# Patient Record
Sex: FEMALE | Race: WHITE | NOT HISPANIC OR LATINO | Employment: OTHER | URBAN - METROPOLITAN AREA
[De-identification: names, ages, dates, MRNs, and addresses within clinical notes are randomized per-mention and may not be internally consistent; named-entity substitution may affect disease eponyms.]

---

## 2017-01-23 ENCOUNTER — PATIENT MESSAGE (OUTPATIENT)
Dept: FAMILY MEDICINE | Facility: CLINIC | Age: 67
End: 2017-01-23

## 2017-01-23 DIAGNOSIS — R12 HEARTBURN: ICD-10-CM

## 2017-01-24 ENCOUNTER — DOCUMENTATION ONLY (OUTPATIENT)
Dept: FAMILY MEDICINE | Facility: CLINIC | Age: 67
End: 2017-01-24

## 2017-01-24 RX ORDER — ESOMEPRAZOLE MAGNESIUM 40 MG/1
CAPSULE, DELAYED RELEASE ORAL
Qty: 90 CAPSULE | Refills: 0 | Status: SHIPPED | OUTPATIENT
Start: 2017-01-24 | End: 2017-05-03 | Stop reason: SDUPTHER

## 2017-01-24 NOTE — PROGRESS NOTES
Pre-Visit Chart Review  For Appointment Scheduled on 01/26/2017    Health Maintenance Due   Topic Date Due    Zoster Vaccine  01/29/2008    Pneumococcal (65+) (1 of 2 - PCV13) 01/29/2013    Influenza Vaccine  08/01/2016

## 2017-01-26 ENCOUNTER — LAB VISIT (OUTPATIENT)
Dept: LAB | Facility: HOSPITAL | Age: 67
End: 2017-01-26
Attending: FAMILY MEDICINE
Payer: MEDICARE

## 2017-01-26 ENCOUNTER — OFFICE VISIT (OUTPATIENT)
Dept: FAMILY MEDICINE | Facility: CLINIC | Age: 67
End: 2017-01-26
Payer: MEDICARE

## 2017-01-26 VITALS
HEIGHT: 64 IN | HEART RATE: 99 BPM | SYSTOLIC BLOOD PRESSURE: 107 MMHG | TEMPERATURE: 99 F | DIASTOLIC BLOOD PRESSURE: 69 MMHG | BODY MASS INDEX: 31.27 KG/M2 | OXYGEN SATURATION: 95 % | WEIGHT: 183.19 LBS

## 2017-01-26 DIAGNOSIS — E55.9 VITAMIN D DEFICIENCY: ICD-10-CM

## 2017-01-26 DIAGNOSIS — I95.9 HYPOTENSION, UNSPECIFIED HYPOTENSION TYPE: Primary | ICD-10-CM

## 2017-01-26 DIAGNOSIS — Z23 NEED FOR VACCINATION FOR STREP PNEUMONIAE: ICD-10-CM

## 2017-01-26 DIAGNOSIS — R53.83 FATIGUE, UNSPECIFIED TYPE: ICD-10-CM

## 2017-01-26 DIAGNOSIS — Z23 NEEDS FLU SHOT: ICD-10-CM

## 2017-01-26 DIAGNOSIS — R06.09 DOE (DYSPNEA ON EXERTION): ICD-10-CM

## 2017-01-26 LAB
25(OH)D3+25(OH)D2 SERPL-MCNC: 45 NG/ML
ALBUMIN SERPL BCP-MCNC: 4 G/DL
ALP SERPL-CCNC: 59 U/L
ALT SERPL W/O P-5'-P-CCNC: 16 U/L
ANION GAP SERPL CALC-SCNC: 10 MMOL/L
AST SERPL-CCNC: 16 U/L
BASOPHILS # BLD AUTO: 0.03 K/UL
BASOPHILS NFR BLD: 0.5 %
BILIRUB SERPL-MCNC: 0.3 MG/DL
BUN SERPL-MCNC: 25 MG/DL
CALCIUM SERPL-MCNC: 9.4 MG/DL
CHLORIDE SERPL-SCNC: 104 MMOL/L
CO2 SERPL-SCNC: 28 MMOL/L
CREAT SERPL-MCNC: 0.9 MG/DL
DIFFERENTIAL METHOD: NORMAL
EOSINOPHIL # BLD AUTO: 0.1 K/UL
EOSINOPHIL NFR BLD: 2.3 %
ERYTHROCYTE [DISTWIDTH] IN BLOOD BY AUTOMATED COUNT: 14.1 %
EST. GFR  (AFRICAN AMERICAN): >60 ML/MIN/1.73 M^2
EST. GFR  (NON AFRICAN AMERICAN): >60 ML/MIN/1.73 M^2
GLUCOSE SERPL-MCNC: 92 MG/DL
HCT VFR BLD AUTO: 37.9 %
HGB BLD-MCNC: 12.5 G/DL
LYMPHOCYTES # BLD AUTO: 2.1 K/UL
LYMPHOCYTES NFR BLD: 33.8 %
MCH RBC QN AUTO: 30.1 PG
MCHC RBC AUTO-ENTMCNC: 33 %
MCV RBC AUTO: 91 FL
MONOCYTES # BLD AUTO: 0.4 K/UL
MONOCYTES NFR BLD: 7.1 %
NEUTROPHILS # BLD AUTO: 3.5 K/UL
NEUTROPHILS NFR BLD: 56.1 %
PLATELET # BLD AUTO: 215 K/UL
PMV BLD AUTO: 9.9 FL
POTASSIUM SERPL-SCNC: 3.7 MMOL/L
PROT SERPL-MCNC: 7.2 G/DL
RBC # BLD AUTO: 4.15 M/UL
SODIUM SERPL-SCNC: 142 MMOL/L
TSH SERPL DL<=0.005 MIU/L-ACNC: 0.45 UIU/ML
WBC # BLD AUTO: 6.18 K/UL

## 2017-01-26 PROCEDURE — 84443 ASSAY THYROID STIM HORMONE: CPT

## 2017-01-26 PROCEDURE — 1157F ADVNC CARE PLAN IN RCRD: CPT | Mod: S$GLB,,, | Performed by: PHYSICIAN ASSISTANT

## 2017-01-26 PROCEDURE — 3078F DIAST BP <80 MM HG: CPT | Mod: S$GLB,,, | Performed by: PHYSICIAN ASSISTANT

## 2017-01-26 PROCEDURE — 85025 COMPLETE CBC W/AUTO DIFF WBC: CPT

## 2017-01-26 PROCEDURE — 90662 IIV NO PRSV INCREASED AG IM: CPT | Mod: S$GLB,,, | Performed by: FAMILY MEDICINE

## 2017-01-26 PROCEDURE — 3074F SYST BP LT 130 MM HG: CPT | Mod: S$GLB,,, | Performed by: PHYSICIAN ASSISTANT

## 2017-01-26 PROCEDURE — 1159F MED LIST DOCD IN RCRD: CPT | Mod: S$GLB,,, | Performed by: PHYSICIAN ASSISTANT

## 2017-01-26 PROCEDURE — 99214 OFFICE O/P EST MOD 30 MIN: CPT | Mod: 25,S$GLB,, | Performed by: PHYSICIAN ASSISTANT

## 2017-01-26 PROCEDURE — 80053 COMPREHEN METABOLIC PANEL: CPT

## 2017-01-26 PROCEDURE — 90670 PCV13 VACCINE IM: CPT | Mod: S$GLB,,, | Performed by: PHYSICIAN ASSISTANT

## 2017-01-26 PROCEDURE — G0008 ADMIN INFLUENZA VIRUS VAC: HCPCS | Mod: S$GLB,,, | Performed by: FAMILY MEDICINE

## 2017-01-26 PROCEDURE — 36415 COLL VENOUS BLD VENIPUNCTURE: CPT | Mod: PO

## 2017-01-26 PROCEDURE — G0009 ADMIN PNEUMOCOCCAL VACCINE: HCPCS | Mod: S$GLB,,, | Performed by: PHYSICIAN ASSISTANT

## 2017-01-26 PROCEDURE — 99999 PR PBB SHADOW E&M-EST. PATIENT-LVL IV: CPT | Mod: PBBFAC,,, | Performed by: PHYSICIAN ASSISTANT

## 2017-01-26 PROCEDURE — 1160F RVW MEDS BY RX/DR IN RCRD: CPT | Mod: S$GLB,,, | Performed by: PHYSICIAN ASSISTANT

## 2017-01-26 PROCEDURE — 82306 VITAMIN D 25 HYDROXY: CPT

## 2017-01-26 PROCEDURE — 1125F AMNT PAIN NOTED PAIN PRSNT: CPT | Mod: S$GLB,,, | Performed by: PHYSICIAN ASSISTANT

## 2017-01-26 RX ORDER — GLUCOSAM/CHON-MSM1/C/MANG/BOSW 500-416.6
TABLET ORAL
COMMUNITY
Start: 2017-01-21 | End: 2019-02-26 | Stop reason: SDUPTHER

## 2017-01-26 RX ORDER — BLOOD-GLUCOSE METER
EACH MISCELLANEOUS
COMMUNITY
Start: 2017-01-21

## 2017-01-26 RX ORDER — CHLORDIAZEPOXIDE HYDROCHLORIDE AND CLIDINIUM BROMIDE 5; 2.5 MG/1; MG/1
CAPSULE ORAL
Refills: 0 | COMMUNITY
Start: 2017-01-09

## 2017-01-26 RX ORDER — BLOOD-GLUCOSE METER
EACH MISCELLANEOUS
COMMUNITY
Start: 2016-11-01

## 2017-01-26 RX ORDER — FUROSEMIDE 20 MG/1
20 TABLET ORAL 2 TIMES DAILY
Qty: 60 TABLET | Refills: 11 | Status: CANCELLED | OUTPATIENT
Start: 2017-01-26 | End: 2018-01-26

## 2017-01-26 RX ORDER — ISOPROPYL ALCOHOL 0.75 G/1
SWAB TOPICAL
COMMUNITY
Start: 2017-01-21 | End: 2017-12-02 | Stop reason: SDUPTHER

## 2017-01-26 RX ORDER — CALCIUM CITRATE/VITAMIN D3 200MG-6.25
TABLET ORAL
COMMUNITY
Start: 2017-01-21 | End: 2019-02-26 | Stop reason: SDUPTHER

## 2017-01-26 NOTE — PROGRESS NOTES
Subjective:       Patient ID: Franca Coffey is a 66 y.o. female.    Chief Complaint: Hypotension    HPI Comments: Mrs. Coffey is a 66 year old female who presents to clinic for evaluation of hypotension. She states her took her BP at the pharmacy when she was feeling fatigued/dizzy and she states her systolic was in the 70s. She decided to stop her avapro and toprol XL at that time. She states her BP has been running 100/68s-120s/60s. She has continued lasix 40 mg daily. She has no hx of CAD or CHF. She reports occasional lower extremity swelling. She continues to complain of generalized fatigue, occasional dizziness (which she states is chronic issue from inner ear problem), and shortness of breath with exertion for the last 3 weeks. She states shortness of breath occurs intermittently. She admits to rare dry cough, but no fever/chills. She complains of chest pain when bending over to pick something from the ground, but denies chest pain with exertion. She complains of GERD. Of note, she has last 20 lbs since August of last year, which is intentional with dietary changes. EKG reviewed and unchanged from prior.     Review of Systems   Constitutional: Positive for fatigue. Negative for activity change, appetite change and fever.   HENT: Negative for congestion, ear pain, hearing loss, sinus pressure and sore throat.    Eyes: Negative for visual disturbance.   Respiratory: Positive for shortness of breath (on exertion). Negative for cough, chest tightness and wheezing.    Cardiovascular: Positive for chest pain (atypical ). Negative for palpitations.   Gastrointestinal: Negative for abdominal pain, constipation, diarrhea, nausea and vomiting.   Musculoskeletal: Positive for myalgias. Negative for arthralgias.   Neurological: Positive for dizziness. Negative for syncope, weakness, light-headedness and headaches.   Hematological: Negative for adenopathy.       Objective:      Vitals:    01/26/17 0825    BP: 107/69   Pulse: 99   Temp: 98.9 °F (37.2 °C)     Physical Exam   Constitutional: She is oriented to person, place, and time. She appears well-developed.   Obese body habitus.   HENT:   Head: Normocephalic and atraumatic.   Eyes: Conjunctivae, EOM and lids are normal. Pupils are equal, round, and reactive to light.   Cardiovascular: Regular rhythm and normal heart sounds.    Pulmonary/Chest: Effort normal and breath sounds normal.   Musculoskeletal:   TTP of bilateral lower legs. No swelling, erythema, or warmth of the calf muscles.    Neurological: She is alert and oriented to person, place, and time.   + symptoms with valeria-hallpike.    Skin: Skin is warm and dry.   Psychiatric: She has a normal mood and affect. Her speech is normal.   Vitals reviewed.      Assessment:       1. Hypotension, unspecified hypotension type    2. CORONADO (dyspnea on exertion)    3. Fatigue, unspecified type    4. Vitamin D deficiency    5. Needs flu shot    6. Need for vaccination for Strep pneumoniae        Plan:       Hypotension, unspecified hypotension type        - Likely due to overmedication with recent weight loss. Continue to hold avapro and toprol XL. Decrease lasix 20 mg daily PRN for lower ext swelling.         - Continue to monitor BP at home with goal >100/60 and <140/90.        - Dizziness appears to be chronic, but may also be secondary to recent hypotension. Continue to monitor.     CORONADO (dyspnea on exertion)  -     IN OFFICE EKG 12-LEAD (to Muse). EKG reviewed with MD and unchanged from prior.     Fatigue, unspecified type  -     CBC auto differential; Future; Expected date: 1/26/17  -     Comprehensive metabolic panel; Future; Expected date: 1/26/17  -     TSH; Future; Expected date: 1/26/17  -     Vitamin D; Future; Expected date: 1/26/17    Vitamin D deficiency  -     Vitamin D; Future; Expected date: 1/26/17    Needs flu shot  -     Influenza - High Dose (65+) (PF) (IM)    Need for vaccination for Strep  pneumoniae  -     Pneumococcal Conjugate Vaccine (13 Valent) (IM)        Follow up in 1 month

## 2017-01-26 NOTE — MR AVS SNAPSHOT
[facility]  [address]  [place] [address]  Phone: [phone]  Fax: [phone]                  Franca Coffey   2017 8:00 AM   Office Visit    Description:  Female : 1950   Provider:  Elissa Osman PA-C   Department:  [facility]           Reason for Visit     Hypotension           Diagnoses this Visit        Comments    Hypotension, unspecified hypotension type    -  Primary     CORONADO (dyspnea on exertion)         Fatigue, unspecified type         Vitamin D deficiency         Needs flu shot         Need for vaccination for Strep pneumoniae                To Do List           Future Appointments        Provider Department Dept Phone    2017 11:10 AM LAB, [facility] - Lab [phone]    2017 11:00 AM Elissa Osman PA-C [facility] [phone]    3/7/2017 11:00 AM MD [facility] - Endo/Diabetes [phone]    3/14/2017 2:00 PM MD [facility] - Cardiology [phone]      Goals (5 Years of Data)     None      Follow-Up and Disposition     Return in about 4 weeks (around 2017).      [facility]     [facility] On Call Nurse Care Line - 24/7 Assistance  Registered nurses in the [facility] On Call Center provide clinical advisement, health education, appointment booking, and other advisory services.  Call for this free service at [phone].             Medications           Message regarding Medications     Verify the changes and/or additions to your medication regime listed below are the same as discussed with your clinician today.  If any of these changes or additions are incorrect, please notify your healthcare provider.        STOP taking these medications     GUAIFENESIN/PHENYLEPHRINE HCL (MUCINEX COLD ORAL) Take 1 tablet by mouth daily as needed.    dulaglutide (TRULICITY) 0.75 mg/0.5 mL PnIj Inject 0.75 mg into the skin once a week.    GLUCOPHAGE 500 mg tablet take 1  "tablet by mouth twice a day with food    furosemide (LASIX) 40 MG tablet TAKE 1 TABLET ONE TIME DAILY           Verify that the below list of medications is an accurate representation of the medications you are currently taking.  If none reported, the list may be blank. If incorrect, please contact your healthcare provider. Carry this list with you in case of emergency.           Current Medications     BD ALCOHOL SWABS PadM     chlordiazepoxide-clidinium 5-2.5 mg (LIBRAX) 5-2.5 mg Cap     duloxetine (CYMBALTA) 60 MG capsule Take 1 capsule (60 mg total) by mouth 2 (two) times daily.    esomeprazole (NEXIUM) 40 MG capsule TAKE 1 CAPSULE ONE TIME DAILY    gabapentin (NEURONTIN) 300 MG capsule Take 1 capsule (300 mg total) by mouth 2 (two) times daily.    levothyroxine (SYNTHROID) 75 MCG tablet take 1 tablet by mouth BEFORE BREAKFAST    liraglutide 0.6 mg/0.1 mL, 18 mg/3 mL, subq PNIJ (VICTOZA 2-KENJI) 0.6 mg/0.1 mL (18 mg/3 mL) PnIj Inject 1.2 mg into the skin once daily. 0.6 mg after 2 weeks advance to 1.2 mg    metoprolol succinate (TOPROL-XL) 50 MG 24 hr tablet TAKE 1 TABLET EVERY DAY    pravastatin (PRAVACHOL) 20 MG tablet Take 1 tablet (20 mg total) by mouth once daily.    topiramate (TOPAMAX) 50 MG tablet Take 1 tablet (50 mg total) by mouth once daily.    TRUE METRIX AIR GLUCOSE METER kit     TRUE METRIX GLUCOSE TEST STRIP Strp     TRUE METRIX LEVEL 1 Soln     TRUEPLUS LANCETS 28 gauge Misc     irbesartan (AVAPRO) 150 MG tablet Take 1 tablet (150 mg total) by mouth once daily.           Clinical Reference Information           Vital Signs - Last Recorded  Most recent update: 1/26/2017  8:29 AM by Lizzy Jordan MA    BP Pulse Temp Ht Wt SpO2    107/69 (BP Location: Right arm, Patient Position: Sitting, BP Method: Automatic) 99 98.9 °F (37.2 °C) (Oral) 5' 4" (1.626 m) 83.1 kg (183 lb 3.2 oz) 95%    BMI                31.45 kg/m2          Blood Pressure          Most Recent Value    BP  107/69      Allergies as " of 1/26/2017     Sulfa (Sulfonamide Antibiotics)    Penicillins    Adhesive    Garlic    Morphine      Immunizations Administered on Date of Encounter - 1/26/2017     Name Date Dose VIS Date Route    Influenza - High Dose 1/26/2017 0.5 mL 8/7/2015 Intramuscular    Pneumococcal Conjugate - 13 Valent 1/26/2017 0.5 mL 11/5/2015 Intramuscular      Orders Placed During Today's Visit      Normal Orders This Visit    IN OFFICE EKG 12-LEAD (to Muse)     Influenza - High Dose (65+) (PF) (IM)     Pneumococcal Conjugate Vaccine (13 Valent) (IM)     Future Labs/Procedures Expected by Expires    CBC auto differential  1/26/2017 3/27/2018    Comprehensive metabolic panel  1/26/2017 3/27/2018    TSH  1/26/2017 3/27/2018    Vitamin D  1/26/2017 3/27/2018      Instructions      Diabetes (General Information)  Diabetes is a long-term health problem. It means your body does not make enough insulin. Or it may mean that your body cannot use the insulin it makes. Insulin is a hormone in your body. It lets blood sugar (glucose) reach the cells in your body. All of your cells need glucose for fuel.  When you have diabetes, the glucose in your blood builds up because it cannot get into the cells. This buildup is called high blood sugar (hyperglycemia).  Your blood sugar level depends on several things. It depends on what kind of food you eat and how much of it you eat. It also depends on how much exercise you get, and how much insulin you have in your body. Eating too much of the wrong kinds of food or not taking diabetes medicine on time can cause high blood sugar. Infections can cause high blood sugar even if you are taking medicines correctly.  These things can also cause low blood sugar:  · Missing meals  · Not eating enough food  · Taking too much diabetes medicine  Diabetes can cause serious problems over time if you do not get treated. These problems include heart disease, stroke, kidney failure, and blindness. They also include  nerve pain or loss of feeling in your legs and feet, and gangrene of the feet. By keeping your blood sugar under control you can prevent or delay these problems.  Normal blood sugar levels are 80 to 100 before a meal and less than 180 in the 1 to 2 hours after a meal.  Home care  Follow these guidelines when caring for yourself at home:  · Follow the diet your healthcare provider gives you. Take insulin or other diabetes medicine exactly as told to.  · Watch your blood sugar as you are told to. Keep a log of your results. This will help your provider change your medicines to keep your blood sugar under control.  · Try to reach your ideal weight. You may be able to cut back on or not have to take diabetes medicine if you eat the right foods and get exercise.  · Do not smoke. Smoking worsens the effects of diabetes on your circulation. You are much more likely to have a heart attack if you have diabetes and you smoke.  · Take good care of your feet. If you have lost feeling in your feet, you may not see an injury or infection. Check your feet and between your toes at least once a week.  · Wear a medical alert bracelet or necklace, or carry a card in your wallet that says you have diabetes. This will help healthcare providers give you the right care if you get very ill and cannot tell them that you have diabetes.  Sick day plan  If you get a cold, the flu, or a bacterial or viral infection, take these steps:  · Look at your diabetes sick plan and call your healthcare provider as you were told to. You may need to call your provider right away if:  ¨ Your blood sugar is above 240 while taking your diabetes medicine  ¨ Your urine ketone levels are above normal or high  ¨ You have been vomiting more than 6 hours  ¨ You have trouble breathing or your breath ha s a fruity smell  ¨ You have a high fever  ¨ You have a fever for several days and you are not getting better  ¨ You get light-headed and are sleepier than  usual  · Keep taking your diabetes pills (oral medicine) even if you have been vomiting and are feeling sick. Call your provider right away because you may need insulin to lower your blood sugar until you recover from your illness.  · Keep taking your insulin even if you have been vomiting and are feeling sick. Call your provider right away to ask if you need to change your insulin dose. This will depend on your blood sugar results.  · Check your blood sugar every 2 to 4 hours, or at least 4 times a day.  · Check your ketones often. If you are vomiting and having diarrhea, watch them more often.  · Do not skip meals. Try to eat small meals on a regular schedule. Do this even if you do not feel like eating.  · Drink water or other liquids that do not have caffeine or calories. This will keep you from getting dehydrated. If you are nauseated or vomiting, takes small sips every 5 minutes. To prevent dehydration try to drink a cup (8 ounces) of fluids every hour while you are awake.  General care  Always bring a source of fast-acting sugar with you in case you have symptoms of low blood sugar (below 70). At the first sign of low blood sugar, eat or drink 15 to 20 grams of fast-acting sugar to raise your blood sugar. Examples are:  · 3 to 4 glucose tablets. You can buy these at most drugstores.  · 4 ounces (1/2 cup) of regular (not diet) soft drinks  · 4 ounces (1/2 cup) of any fruit juice  · 8 ounces (1 cup) of milk  · 5 to 6 pieces of hard candy  · 1 tablespoon of honey  Check your blood sugar 15 minutes after treating yourself. If it is still below 70, take 15 to 20 more grams of fast-acting sugar. Test again in 15 minutes. If it returns to normal (70 or above), eat a snack or meal to keep your blood sugar in a safe range. If it stays low, call your doctor or go to an emergency room.  Follow-up care  Follow-up with your healthcare provider, or as advised. For more information about diabetes, visit the American  Diabetes Association website at www.diabetes.org or call 823-196-3773.  When to seek medical advice  Call your healthcare provider right away if you have any of these symptoms of high blood sugar:  · Frequent urination  · Dizziness  · Drowsiness  · Thirst  · Headache  · Nausea or vomiting  · Abdominal pain  · Eyesight changes  · Fast breathing  · Confusion or loss of consciousness  Also call your provider right away if you have any of these signs of low blood sugar:  · Fatigue  · Headache  · Shakes  · Excess sweating  · Hunger  · Feeling anxious or restless  · Eyesight changes  · Drowsiness  · Weakness  · Confusion or loss of consciousness  Call 911  Call for emergency help right away if any of these occur:  · Chest pain or shortness of breath  · Dizziness or fainting  · Weakness of an arm or leg or one side of the face  · Trouble speaking or seeing   © 0583-5092 [employer]. [address] [place] [address]. All rights reserved. This information is not intended as a substitute for professional medical care. Always follow your healthcare professional's instructions.        Walking for Fitness  Fitness walking has something for everyone, even people who are already fit. Walking is one of the safest ways to condition your body aerobically. It can boost energy, help you lose weight, and reduce stress.    Physical benefits  · Walking strengthens your heart and lungs, and tones your muscles.  · When walking, your feet land with less impact than in other sports. This reduces chances of muscle, bone, and joint injury.  · Regular walking improves your cholesterol levels and lowers your risk of heart disease. And it helps you control your blood sugar if you have diabetes.  · Walking is a weight-bearing activity, which helps maintain bone density. This can help prevent osteoporosis.  Personal rewards  · Taking walks can help you relax and manage stress. And fitness walking may make you feel better about  yourself.  · Walking can help you sleep better at night and make you less likely to be depressed.  · Regular walking may help maintain your memory as you get older.  · Walking is a great way to spend extra time with friends and family members. Be sure to invite your dog along!  Q&A about fitness walking  Q: Will walking keep me fit?  A: Yes. Regular walking at the right pace gives you all the benefits of other aerobic activities, such as jogging and swimming.  Q: Will walking help me lose weight and keep it off?  A: Yes. Per mile, walking can burn as many calories as jogging. Your health care provider can help work walking into your weight-loss plan.  Q: Is walking safe for my health?  A: Yes. Walking is safe if you have high blood pressure, diabetes, heart disease, or other conditions. Talk to your health care provider before you start.  © 5283-7308 [employer]. [address] [place] [address]. All rights reserved. This information is not intended as a substitute for professional medical care. Always follow your healthcare professional's instructions.        Weight Management: Getting Started  Healthy bodies come in all shapes and sizes. Not all bodies are made to be thin. For some people, a healthy weight is higher than the average weight listed on weight charts. Your healthcare provider can help you decide on a healthy weight for you.    Reasons to lose weight  Losing weight can help with some health problems, such as high blood pressure, heart disease, diabetes, sleep apnea, and arthritis. You may also feel more energy.  Set your long-term goal  Your goal doesn't even have to be a specific weight. You may decide on a fitness goal (such as being able to walk 10 miles a week), or a health goal (such as lowering your blood pressure). Choose a goal that is measurable and reasonable, so you know when you've reached it. A goal of reaching a BMI of less than 25 is not always reasonable (or  possible).   Make an action plan  Habits dont change overnight. Setting your goals too high can leave you feeling discouraged if you cant reach them. Be realistic. Choose one or two small changes you can make now. Set an action plan for how you are going to make these changes. When you can stick to this plan, keep making a few more small changes. Taking small steps will help you stay on the path to success.  Track your progress  Write down your goals. Then, keep a daily record of your progress. Write down what you eat and how active you are. This record lets you look back on how much youve done. It may also help when youre feeling frustrated. Reward yourself for success. Even if you dont reach every goal, give yourself credit for what you do get done.  Get support  Encouragement from others can help make losing weight easier. Ask your family members and friends for support. They may even want to join you. Also look to your healthcare provider, registered dietitian, and  for help. Your local hospital can give you more information about nutrition, exercise, and weight loss.  © 2812-6303 [employer]. [address] [place] [address], [place] [address]. All rights reserved. This information is not intended as a substitute for professional medical care. Always follow your healthcare professional's instructions.

## 2017-01-26 NOTE — PATIENT INSTRUCTIONS
Diabetes (General Information)  Diabetes is a long-term health problem. It means your body does not make enough insulin. Or it may mean that your body cannot use the insulin it makes. Insulin is a hormone in your body. It lets blood sugar (glucose) reach the cells in your body. All of your cells need glucose for fuel.  When you have diabetes, the glucose in your blood builds up because it cannot get into the cells. This buildup is called high blood sugar (hyperglycemia).  Your blood sugar level depends on several things. It depends on what kind of food you eat and how much of it you eat. It also depends on how much exercise you get, and how much insulin you have in your body. Eating too much of the wrong kinds of food or not taking diabetes medicine on time can cause high blood sugar. Infections can cause high blood sugar even if you are taking medicines correctly.  These things can also cause low blood sugar:  · Missing meals  · Not eating enough food  · Taking too much diabetes medicine  Diabetes can cause serious problems over time if you do not get treated. These problems include heart disease, stroke, kidney failure, and blindness. They also include nerve pain or loss of feeling in your legs and feet, and gangrene of the feet. By keeping your blood sugar under control you can prevent or delay these problems.  Normal blood sugar levels are 80 to 100 before a meal and less than 180 in the 1 to 2 hours after a meal.  Home care  Follow these guidelines when caring for yourself at home:  · Follow the diet your healthcare provider gives you. Take insulin or other diabetes medicine exactly as told to.  · Watch your blood sugar as you are told to. Keep a log of your results. This will help your provider change your medicines to keep your blood sugar under control.  · Try to reach your ideal weight. You may be able to cut back on or not have to take diabetes medicine if you eat the right foods and get exercise.  · Do  not smoke. Smoking worsens the effects of diabetes on your circulation. You are much more likely to have a heart attack if you have diabetes and you smoke.  · Take good care of your feet. If you have lost feeling in your feet, you may not see an injury or infection. Check your feet and between your toes at least once a week.  · Wear a medical alert bracelet or necklace, or carry a card in your wallet that says you have diabetes. This will help healthcare providers give you the right care if you get very ill and cannot tell them that you have diabetes.  Sick day plan  If you get a cold, the flu, or a bacterial or viral infection, take these steps:  · Look at your diabetes sick plan and call your healthcare provider as you were told to. You may need to call your provider right away if:  ¨ Your blood sugar is above 240 while taking your diabetes medicine  ¨ Your urine ketone levels are above normal or high  ¨ You have been vomiting more than 6 hours  ¨ You have trouble breathing or your breath ha s a fruity smell  ¨ You have a high fever  ¨ You have a fever for several days and you are not getting better  ¨ You get light-headed and are sleepier than usual  · Keep taking your diabetes pills (oral medicine) even if you have been vomiting and are feeling sick. Call your provider right away because you may need insulin to lower your blood sugar until you recover from your illness.  · Keep taking your insulin even if you have been vomiting and are feeling sick. Call your provider right away to ask if you need to change your insulin dose. This will depend on your blood sugar results.  · Check your blood sugar every 2 to 4 hours, or at least 4 times a day.  · Check your ketones often. If you are vomiting and having diarrhea, watch them more often.  · Do not skip meals. Try to eat small meals on a regular schedule. Do this even if you do not feel like eating.  · Drink water or other liquids that do not have caffeine or  calories. This will keep you from getting dehydrated. If you are nauseated or vomiting, takes small sips every 5 minutes. To prevent dehydration try to drink a cup (8 ounces) of fluids every hour while you are awake.  General care  Always bring a source of fast-acting sugar with you in case you have symptoms of low blood sugar (below 70). At the first sign of low blood sugar, eat or drink 15 to 20 grams of fast-acting sugar to raise your blood sugar. Examples are:  · 3 to 4 glucose tablets. You can buy these at most [facility].  · 4 ounces (1/2 cup) of regular (not diet) soft drinks  · 4 ounces (1/2 cup) of any fruit juice  · 8 ounces (1 cup) of milk  · 5 to 6 pieces of hard candy  · 1 tablespoon of honey  Check your blood sugar 15 minutes after treating yourself. If it is still below 70, take 15 to 20 more grams of fast-acting sugar. Test again in 15 minutes. If it returns to normal (70 or above), eat a snack or meal to keep your blood sugar in a safe range. If it stays low, call your doctor or go to an emergency room.  Follow-up care  Follow-up with your healthcare provider, or as advised. For more information about diabetes, visit the American Diabetes Association website at www.diabetes.org or call 434-934-7667.  When to seek medical advice  Call your healthcare provider right away if you have any of these symptoms of high blood sugar:  · Frequent urination  · Dizziness  · Drowsiness  · Thirst  · Headache  · Nausea or vomiting  · Abdominal pain  · Eyesight changes  · Fast breathing  · Confusion or loss of consciousness  Also call your provider right away if you have any of these signs of low blood sugar:  · Fatigue  · Headache  · Shakes  · Excess sweating  · Hunger  · Feeling anxious or restless  · Eyesight changes  · Drowsiness  · Weakness  · Confusion or loss of consciousness  Call 911  Call for emergency help right away if any of these occur:  · Chest pain or shortness of breath  · Dizziness or  fainting  · Weakness of an arm or leg or one side of the face  · Trouble speaking or seeing   © 2000-2016 [employer]. [address] [place] [address], [place] [address]. All rights reserved. This information is not intended as a substitute for professional medical care. Always follow your healthcare professional's instructions.        Walking for Fitness  Fitness walking has something for everyone, even people who are already fit. Walking is one of the safest ways to condition your body aerobically. It can boost energy, help you lose weight, and reduce stress.    Physical benefits  · Walking strengthens your heart and lungs, and tones your muscles.  · When walking, your feet land with less impact than in other sports. This reduces chances of muscle, bone, and joint injury.  · Regular walking improves your cholesterol levels and lowers your risk of heart disease. And it helps you control your blood sugar if you have diabetes.  · Walking is a weight-bearing activity, which helps maintain bone density. This can help prevent osteoporosis.  Personal rewards  · Taking walks can help you relax and manage stress. And fitness walking may make you feel better about yourself.  · Walking can help you sleep better at night and make you less likely to be depressed.  · Regular walking may help maintain your memory as you get older.  · Walking is a great way to spend extra time with friends and family members. Be sure to invite your dog along!  Q&A about fitness walking  Q: Will walking keep me fit?  A: Yes. Regular walking at the right pace gives you all the benefits of other aerobic activities, such as jogging and swimming.  Q: Will walking help me lose weight and keep it off?  A: Yes. Per mile, walking can burn as many calories as jogging. Your health care provider can help work walking into your weight-loss plan.  Q: Is walking safe for my health?  A: Yes. Walking is safe if you have high blood pressure, diabetes, heart  disease, or other conditions. Talk to your health care provider before you start.  © 3746-9370 [employer]. [address] [place] [address], [place] [address]. All rights reserved. This information is not intended as a substitute for professional medical care. Always follow your healthcare professional's instructions.        Weight Management: Getting Started  Healthy bodies come in all shapes and sizes. Not all bodies are made to be thin. For some people, a healthy weight is higher than the average weight listed on weight charts. Your healthcare provider can help you decide on a healthy weight for you.    Reasons to lose weight  Losing weight can help with some health problems, such as high blood pressure, heart disease, diabetes, sleep apnea, and arthritis. You may also feel more energy.  Set your long-term goal  Your goal doesn't even have to be a specific weight. You may decide on a fitness goal (such as being able to walk 10 miles a week), or a health goal (such as lowering your blood pressure). Choose a goal that is measurable and reasonable, so you know when you've reached it. A goal of reaching a BMI of less than 25 is not always reasonable (or possible).   Make an action plan  Habits dont change overnight. Setting your goals too high can leave you feeling discouraged if you cant reach them. Be realistic. Choose one or two small changes you can make now. Set an action plan for how you are going to make these changes. When you can stick to this plan, keep making a few more small changes. Taking small steps will help you stay on the path to success.  Track your progress  Write down your goals. Then, keep a daily record of your progress. Write down what you eat and how active you are. This record lets you look back on how much youve done. It may also help when youre feeling frustrated. Reward yourself for success. Even if you dont reach every goal, give yourself credit for what you do get done.  Get  support  Encouragement from others can help make losing weight easier. Ask your family members and friends for support. They may even want to join you. Also look to your healthcare provider, registered dietitian, and  for help. Your local hospital can give you more information about nutrition, exercise, and weight loss.  © 4385-7699 [employer]. [address] [place] [address], [place] [address]. All rights reserved. This information is not intended as a substitute for professional medical care. Always follow your healthcare professional's instructions.

## 2017-01-26 NOTE — NURSING
2 patient identifiers used (name & ). Administered Flu vaccine IM. Patient tolerated well, no bleeding at insertion site noted. Pain scale 0/10. Aseptic technique maintained. Immunization information given to patient. Advised patient to remain in clinic for 15 minutes to monitor for reaction. No AR noted.2 patient identifiers used (name & ). Administered Prevnar vaccine IM. Patient tolerated well, no bleeding at insertion site noted. Pain scale 0/10. Aseptic technique maintained. Vaccine information given to patient.

## 2017-01-27 ENCOUNTER — TELEPHONE (OUTPATIENT)
Dept: FAMILY MEDICINE | Facility: CLINIC | Age: 67
End: 2017-01-27

## 2017-01-27 NOTE — TELEPHONE ENCOUNTER
----- Message from Elissa Osman PA-C sent at 1/27/2017  8:48 AM CST -----  Please reassure the patient that all of her labs including thyroid, Vit D, electrolytes, liver function, kidney function, and blood counts are within normal limits.

## 2017-01-30 RX ORDER — DULOXETIN HYDROCHLORIDE 60 MG/1
CAPSULE, DELAYED RELEASE ORAL
Qty: 180 CAPSULE | Refills: 1 | Status: SHIPPED | OUTPATIENT
Start: 2017-01-30 | End: 2017-02-02 | Stop reason: SDUPTHER

## 2017-01-31 ENCOUNTER — PATIENT MESSAGE (OUTPATIENT)
Dept: ENDOCRINOLOGY | Facility: CLINIC | Age: 67
End: 2017-01-31

## 2017-02-02 DIAGNOSIS — M79.7 FIBROMYALGIA: ICD-10-CM

## 2017-02-02 RX ORDER — DULOXETIN HYDROCHLORIDE 60 MG/1
CAPSULE, DELAYED RELEASE ORAL
Qty: 60 CAPSULE | Refills: 0 | Status: SHIPPED | OUTPATIENT
Start: 2017-02-02 | End: 2017-08-01 | Stop reason: SDUPTHER

## 2017-02-21 ENCOUNTER — DOCUMENTATION ONLY (OUTPATIENT)
Dept: FAMILY MEDICINE | Facility: CLINIC | Age: 67
End: 2017-02-21

## 2017-02-21 NOTE — PROGRESS NOTES
Pre-Visit Chart Review  For Appointment Scheduled on 02/23/2017    Health Maintenance Due   Topic Date Due    Zoster Vaccine  01/29/2008

## 2017-02-23 ENCOUNTER — OFFICE VISIT (OUTPATIENT)
Dept: FAMILY MEDICINE | Facility: CLINIC | Age: 67
End: 2017-02-23
Payer: MEDICARE

## 2017-02-23 VITALS
HEIGHT: 64 IN | SYSTOLIC BLOOD PRESSURE: 106 MMHG | TEMPERATURE: 99 F | DIASTOLIC BLOOD PRESSURE: 71 MMHG | BODY MASS INDEX: 30.53 KG/M2 | WEIGHT: 178.81 LBS | HEART RATE: 86 BPM

## 2017-02-23 DIAGNOSIS — M79.7 FIBROMYALGIA: ICD-10-CM

## 2017-02-23 DIAGNOSIS — I10 ESSENTIAL HYPERTENSION: Primary | ICD-10-CM

## 2017-02-23 DIAGNOSIS — E66.9 OBESITY (BMI 30.0-34.9): ICD-10-CM

## 2017-02-23 DIAGNOSIS — J02.8 SORE THROAT (VIRAL): ICD-10-CM

## 2017-02-23 DIAGNOSIS — B97.89 SORE THROAT (VIRAL): ICD-10-CM

## 2017-02-23 PROCEDURE — 1157F ADVNC CARE PLAN IN RCRD: CPT | Mod: S$GLB,,, | Performed by: PHYSICIAN ASSISTANT

## 2017-02-23 PROCEDURE — 1159F MED LIST DOCD IN RCRD: CPT | Mod: S$GLB,,, | Performed by: PHYSICIAN ASSISTANT

## 2017-02-23 PROCEDURE — 1125F AMNT PAIN NOTED PAIN PRSNT: CPT | Mod: S$GLB,,, | Performed by: PHYSICIAN ASSISTANT

## 2017-02-23 PROCEDURE — 3078F DIAST BP <80 MM HG: CPT | Mod: S$GLB,,, | Performed by: PHYSICIAN ASSISTANT

## 2017-02-23 PROCEDURE — 1160F RVW MEDS BY RX/DR IN RCRD: CPT | Mod: S$GLB,,, | Performed by: PHYSICIAN ASSISTANT

## 2017-02-23 PROCEDURE — 99214 OFFICE O/P EST MOD 30 MIN: CPT | Mod: S$GLB,,, | Performed by: PHYSICIAN ASSISTANT

## 2017-02-23 PROCEDURE — 3074F SYST BP LT 130 MM HG: CPT | Mod: S$GLB,,, | Performed by: PHYSICIAN ASSISTANT

## 2017-02-23 PROCEDURE — 99999 PR PBB SHADOW E&M-EST. PATIENT-LVL IV: CPT | Mod: PBBFAC,,, | Performed by: PHYSICIAN ASSISTANT

## 2017-02-23 RX ORDER — FUROSEMIDE 20 MG/1
20 TABLET ORAL DAILY
Status: CANCELLED | OUTPATIENT
Start: 2017-02-23

## 2017-02-23 RX ORDER — METOPROLOL SUCCINATE 25 MG/1
25 TABLET, EXTENDED RELEASE ORAL DAILY
Qty: 30 TABLET | Refills: 5 | Status: SHIPPED | OUTPATIENT
Start: 2017-02-23 | End: 2017-08-14 | Stop reason: SDUPTHER

## 2017-02-23 RX ORDER — FUROSEMIDE 20 MG/1
20 TABLET ORAL DAILY
Qty: 30 TABLET | Refills: 11 | Status: SHIPPED | OUTPATIENT
Start: 2017-02-23 | End: 2017-12-07 | Stop reason: SDUPTHER

## 2017-02-23 NOTE — PATIENT INSTRUCTIONS
Weight Management: Getting Started  Healthy bodies come in all shapes and sizes. Not all bodies are made to be thin. For some people, a healthy weight is higher than the average weight listed on weight charts. Your healthcare provider can help you decide on a healthy weight for you.    Reasons to lose weight  Losing weight can help with some health problems, such as high blood pressure, heart disease, diabetes, sleep apnea, and arthritis. You may also feel more energy.  Set your long-term goal  Your goal doesn't even have to be a specific weight. You may decide on a fitness goal (such as being able to walk 10 miles a week), or a health goal (such as lowering your blood pressure). Choose a goal that is measurable and reasonable, so you know when you've reached it. A goal of reaching a BMI of less than 25 is not always reasonable (or possible).   Make an action plan  Habits dont change overnight. Setting your goals too high can leave you feeling discouraged if you cant reach them. Be realistic. Choose one or two small changes you can make now. Set an action plan for how you are going to make these changes. When you can stick to this plan, keep making a few more small changes. Taking small steps will help you stay on the path to success.  Track your progress  Write down your goals. Then, keep a daily record of your progress. Write down what you eat and how active you are. This record lets you look back on how much youve done. It may also help when youre feeling frustrated. Reward yourself for success. Even if you dont reach every goal, give yourself credit for what you do get done.  Get support  Encouragement from others can help make losing weight easier. Ask your family members and friends for support. They may even want to join you. Also look to your healthcare provider, registered dietitian, and  for help. Your local hospital can give you more information about nutrition, exercise, and  weight loss.  Date Last Reviewed: 1/31/2016 © 2000-2016 [employer]. [address] [place] [address], [place] [address]. All rights reserved. This information is not intended as a substitute for professional medical care. Always follow your healthcare professional's instructions.        Walking for Fitness  Fitness walking has something for everyone, even people who are already fit. Walking is one of the safest ways to condition your body aerobically. It can boost energy, help you lose weight, and reduce stress.    Physical benefits  · Walking strengthens your heart and lungs, and tones your muscles.  · When walking, your feet land with less impact than in other sports. This reduces chances of muscle, bone, and joint injury.  · Regular walking improves your cholesterol levels and lowers your risk of heart disease. And it helps you control your blood sugar if you have diabetes.  · Walking is a weight-bearing activity, which helps maintain bone density. This can help prevent osteoporosis.  Personal rewards  · Taking walks can help you relax and manage stress. And fitness walking may make you feel better about yourself.  · Walking can help you sleep better at night and make you less likely to be depressed.  · Regular walking may help maintain your memory as you get older.  · Walking is a great way to spend extra time with friends and family members. Be sure to invite your dog along!  Q&A about fitness walking  Q: Will walking keep me fit?  A: Yes. Regular walking at the right pace gives you all the benefits of other aerobic activities, such as jogging and swimming.  Q: Will walking help me lose weight and keep it off?  A: Yes. Per mile, walking can burn as many calories as jogging. Your health care provider can help work walking into your weight-loss plan.  Q: Is walking safe for my health?  A: Yes. Walking is safe if you have high blood pressure, diabetes, heart disease, or other conditions. Talk to your health  care provider before you start.  Date Last Reviewed: 5/9/2015 © 2000-2016 [facility]. [address] [place] [address], [place] [address]. All rights reserved. This information is not intended as a substitute for professional medical care. Always follow your healthcare professional's instructions.        Diabetes (General Information)  Diabetes is a long-term health problem. It means your body does not make enough insulin. Or it may mean that your body cannot use the insulin it makes. Insulin is a hormone in your body. It lets blood sugar (glucose) reach the cells in your body. All of your cells need glucose for fuel.  When you have diabetes, the glucose in your blood builds up because it cannot get into the cells. This buildup is called high blood sugar (hyperglycemia).  Your blood sugar level depends on several things. It depends on what kind of food you eat and how much of it you eat. It also depends on how much exercise you get, and how much insulin you have in your body. Eating too much of the wrong kinds of food or not taking diabetes medicine on time can cause high blood sugar. Infections can cause high blood sugar even if you are taking medicines correctly.  These things can also cause low blood sugar:  · Missing meals  · Not eating enough food  · Taking too much diabetes medicine  Diabetes can cause serious problems over time if you do not get treated. These problems include heart disease, stroke, kidney failure, and blindness. They also include nerve pain or loss of feeling in your legs and feet, and gangrene of the feet. By keeping your blood sugar under control you can prevent or delay these problems.  Normal blood sugar levels are 80 to 100 before a meal and less than 180 in the 1 to 2 hours after a meal.  Home care  Follow these guidelines when caring for yourself at home:  · Follow the diet your healthcare provider gives you. Take insulin or other diabetes medicine exactly as told to.  · Watch  your blood sugar as you are told to. Keep a log of your results. This will help your provider change your medicines to keep your blood sugar under control.  · Try to reach your ideal weight. You may be able to cut back on or not have to take diabetes medicine if you eat the right foods and get exercise.  · Do not smoke. Smoking worsens the effects of diabetes on your circulation. You are much more likely to have a heart attack if you have diabetes and you smoke.  · Take good care of your feet. If you have lost feeling in your feet, you may not see an injury or infection. Check your feet and between your toes at least once a week.  · Wear a medical alert bracelet or necklace, or carry a card in your wallet that says you have diabetes. This will help healthcare providers give you the right care if you get very ill and cannot tell them that you have diabetes.  Sick day plan  If you get a cold, the flu, or a bacterial or viral infection, take these steps:  · Look at your diabetes sick plan and call your healthcare provider as you were told to. You may need to call your provider right away if:  ¨ Your blood sugar is above 240 while taking your diabetes medicine  ¨ Your urine ketone levels are above normal or high  ¨ You have been vomiting more than 6 hours  ¨ You have trouble breathing or your breath ha s a fruity smell  ¨ You have a high fever  ¨ You have a fever for several days and you are not getting better  ¨ You get light-headed and are sleepier than usual  · Keep taking your diabetes pills (oral medicine) even if you have been vomiting and are feeling sick. Call your provider right away because you may need insulin to lower your blood sugar until you recover from your illness.  · Keep taking your insulin even if you have been vomiting and are feeling sick. Call your provider right away to ask if you need to change your insulin dose. This will depend on your blood sugar results.  · Check your blood sugar every 2 to  4 hours, or at least 4 times a day.  · Check your ketones often. If you are vomiting and having diarrhea, watch them more often.  · Do not skip meals. Try to eat small meals on a regular schedule. Do this even if you do not feel like eating.  · Drink water or other liquids that do not have caffeine or calories. This will keep you from getting dehydrated. If you are nauseated or vomiting, takes small sips every 5 minutes. To prevent dehydration try to drink a cup (8 ounces) of fluids every hour while you are awake.  General care  Always bring a source of fast-acting sugar with you in case you have symptoms of low blood sugar (below 70). At the first sign of low blood sugar, eat or drink 15 to 20 grams of fast-acting sugar to raise your blood sugar. Examples are:  · 3 to 4 glucose tablets. You can buy these at most [employer].  · 4 ounces (1/2 cup) of regular (not diet) soft drinks  · 4 ounces (1/2 cup) of any fruit juice  · 8 ounces (1 cup) of milk  · 5 to 6 pieces of hard candy  · 1 tablespoon of honey  Check your blood sugar 15 minutes after treating yourself. If it is still below 70, take 15 to 20 more grams of fast-acting sugar. Test again in 15 minutes. If it returns to normal (70 or above), eat a snack or meal to keep your blood sugar in a safe range. If it stays low, call your doctor or go to an emergency room.  Follow-up care  Follow-up with your healthcare provider, or as advised. For more information about diabetes, visit the American Diabetes Association website at www.diabetes.org or call 309-211-4962.  When to seek medical advice  Call your healthcare provider right away if you have any of these symptoms of high blood sugar:  · Frequent urination  · Dizziness  · Drowsiness  · Thirst  · Headache  · Nausea or vomiting  · Abdominal pain  · Eyesight changes  · Fast breathing  · Confusion or loss of consciousness  Also call your provider right away if you have any of these signs of low blood  sugar:  · Fatigue  · Headache  · Shakes  · Excess sweating  · Hunger  · Feeling anxious or restless  · Eyesight changes  · Drowsiness  · Weakness  · Confusion or loss of consciousness  Call 911  Call for emergency help right away if any of these occur:  · Chest pain or shortness of breath  · Dizziness or fainting  · Weakness of an arm or leg or one side of the face  · Trouble speaking or seeing   Date Last Reviewed: 6/1/2016  © 0814-6556 [employer]. [address] [place] [address], [place] [address]. All rights reserved. This information is not intended as a substitute for professional medical care. Always follow your healthcare professional's instructions.        Controlling High Blood Pressure  High blood pressure (hypertension) is often called the silent killer. This is because many people who have it dont know it. High blood pressure is defined as 140/90 mm Hg or higher. Know your blood pressure and remember to check it regularly. Doing so can save your life. Here are some things you can do to help control your blood pressure.    Choose heart-healthy foods  · Select low-salt, low-fat foods. Limit sodium intake to 2,400 mg per day or the amount suggested by your healthcare provider.  · Limit canned, dried, cured, packaged, and fast foods. These can contain a lot of salt.  · Eat 8 to 10 servings of fruits and vegetables every day.  · Choose lean meats, fish, or chicken.  · Eat whole-grain pasta, brown rice, and beans.  · Eat 2 to 3 servings of low-fat or fat-free dairy products.  · Ask your doctor about the DASH eating plan. This plan helps reduce blood pressure.  · When you go to a restaurant, ask that your meal be prepared with no added salt.  Maintain a healthy weight  · Ask your healthcare provider how many calories to eat a day. Then stick to that number.  · Ask your healthcare provider what weight range is healthiest for you. If you are overweight, a weight loss of only 3% to 5% of your body  weight can help lower blood pressure. Generally, a good weight loss goal is to lose 10% of your body weight in a year.  · Limit snacks and sweets.  · Get regular exercise.  Get up and get active  · Choose activities you enjoy. Find ones you can do with friends or family. This includes bicycling, dancing, walking, and jogging.  · Park farther away from building entrances.  · Use stairs instead of the elevator.  · When you can, walk or bike instead of driving.  · [place] leaves, garden, or do household repairs.  · Be active at a moderate to vigorous level of physical activity for at least 40 minutes for a minimum of 3 to 4 days a week.   Manage stress  · Make time to relax and enjoy life. Find time to laugh.  · Communicate your concerns with your loved ones and your healthcare provider.  · Visit with family and friends, and keep up with hobbies.  Limit alcohol and quit smoking  · Men should have no more than 2 drinks per day.  · Women should have no more than 1 drink per day.  · Talk with your healthcare provider about quitting smoking. Smoking significantly increases your risk for heart disease and stroke. Ask your healthcare provider about community smoking cessation programs and other options.  Medicines  If lifestyle changes arent enough, your healthcare provider may prescribe high blood pressure medicine. Take all medicines as prescribed. If you have any questions about your medicines, ask your healthcare provider before stopping or changing them.   Date Last Reviewed: 4/27/2016  © 3560-1426 [employer]. [address] [place] [address], [place] [address]. All rights reserved. This information is not intended as a substitute for professional medical care. Always follow your healthcare professional's instructions.

## 2017-02-23 NOTE — MR AVS SNAPSHOT
[place] [facility]  [address]  [place] [address]  Phone: [phone]  Fax: [phone]                  Franca Coffey   2017 11:00 AM   Office Visit    Description:  Female : 1950   Provider:  SULEMAN [facility]   Department:  [place] [facility]           Reason for Visit     Follow-up           Diagnoses this Visit        Comments    Essential hypertension    -  Primary     Fibromyalgia                To Do List           Future Appointments        Provider Department Dept Phone    3/7/2017 11:00 AM Bronson Agrawal MD [place] [facility] [phone]    3/14/2017 2:00 PM Koby Mcdaniels MD [place] [facility] Card[facility] [phone]    3/28/2017 2:00 PM Sherley Velásquez MD [place] [facility] [phone]    3/28/2017 4:20 PM Noah Chowdhury MD [facility] [phone]      Goals (5 Years of Data)     None       These Medications        Disp Refills Start End    metoprolol succinate (TOPROL-XL) 25 MG 24 hr tablet 30 tablet 5 2017    Take 1 tablet (25 mg total) by mouth once daily. - Oral    Pharmacy: [address] Ph #: [phone]       furosemide (LASIX) 20 MG tablet 30 tablet 11 2017    Take 1 tablet (20 mg total) by mouth once daily. - Oral    Pharmacy: [address] Ph #: [phone]         [facility]     [facility] On Call Nurse Care Line -  Assistance  Registered nurses in the [facility] provide clinical advisement, health education, appointment booking, and other advisory services.  Call for this free service at [phone].             Medications           Message regarding Medications     Verify the changes and/or additions to your medication regime listed below are the same as discussed with your clinician today.  If any of these changes or additions are incorrect,  please notify your healthcare provider.        START taking these NEW medications        Refills    metoprolol succinate (TOPROL-XL) 25 MG 24 hr tablet 5    Sig: Take 1 tablet (25 mg total) by mouth once daily.    Class: Normal    Route: Oral    furosemide (LASIX) 20 MG tablet 11    Sig: Take 1 tablet (20 mg total) by mouth once daily.    Class: Normal    Route: Oral           Verify that the below list of medications is an accurate representation of the medications you are currently taking.  If none reported, the list may be blank. If incorrect, please contact your healthcare provider. Carry this list with you in case of emergency.           Current Medications     BD ALCOHOL SWABS PadM     chlordiazepoxide-clidinium 5-2.5 mg (LIBRAX) 5-2.5 mg Cap     duloxetine (CYMBALTA) 60 MG capsule TAKE ONE CAPSULE BY MOUTH TWO TIMES DAILY    esomeprazole (NEXIUM) 40 MG capsule TAKE 1 CAPSULE ONE TIME DAILY    gabapentin (NEURONTIN) 300 MG capsule Take 1 capsule (300 mg total) by mouth 2 (two) times daily.    levothyroxine (SYNTHROID) 75 MCG tablet take 1 tablet by mouth BEFORE BREAKFAST    liraglutide 0.6 mg/0.1 mL, 18 mg/3 mL, subq PNIJ (VICTOZA 2-KENJI) 0.6 mg/0.1 mL (18 mg/3 mL) PnIj Inject 1.2 mg into the skin once daily. 0.6 mg after 2 weeks advance to 1.2 mg    pravastatin (PRAVACHOL) 20 MG tablet Take 1 tablet (20 mg total) by mouth once daily.    TRUE METRIX AIR GLUCOSE METER kit     TRUE METRIX GLUCOSE TEST STRIP Strp     TRUE METRIX LEVEL 1 Soln     TRUEPLUS LANCETS 28 gauge Misc     furosemide (LASIX) 20 MG tablet Take 1 tablet (20 mg total) by mouth once daily.    metoprolol succinate (TOPROL-XL) 25 MG 24 hr tablet Take 1 tablet (25 mg total) by mouth once daily.    topiramate (TOPAMAX) 50 MG tablet Take 1 tablet (50 mg total) by mouth once daily.           Clinical Reference Information           Your Vitals Were     BP Pulse Temp Height Weight BMI    106/71 (BP Location: Right arm, Patient Position: Sitting, BP  "Method: Automatic) 86 98.9 °F (37.2 °C) (Oral) 5' 4" (1.626 m) 81.1 kg (178 lb 12.7 oz) 30.69 kg/m2      Blood Pressure          Most Recent Value    BP  106/71      Allergies as of 2/23/2017     Sulfa (Sulfonamide Antibiotics)    Penicillins    Adhesive    Garlic    Morphine      Immunizations Administered on Date of Encounter - 2/23/2017     None      Orders Placed During Today's Visit      Normal Orders This Visit    Ambulatory referral to Rheumatology       Instructions      Weight Management: Getting Started  Healthy bodies come in all shapes and sizes. Not all bodies are made to be thin. For some people, a healthy weight is higher than the average weight listed on weight charts. Your healthcare provider can help you decide on a healthy weight for you.    Reasons to lose weight  Losing weight can help with some health problems, such as high blood pressure, heart disease, diabetes, sleep apnea, and arthritis. You may also feel more energy.  Set your long-term goal  Your goal doesn't even have to be a specific weight. You may decide on a fitness goal (such as being able to walk 10 miles a week), or a health goal (such as lowering your blood pressure). Choose a goal that is measurable and reasonable, so you know when you've reached it. A goal of reaching a BMI of less than 25 is not always reasonable (or possible).   Make an action plan  Habits dont change overnight. Setting your goals too high can leave you feeling discouraged if you cant reach them. Be realistic. Choose one or two small changes you can make now. Set an action plan for how you are going to make these changes. When you can stick to this plan, keep making a few more small changes. Taking small steps will help you stay on the path to success.  Track your progress  Write down your goals. Then, keep a daily record of your progress. Write down what you eat and how active you are. This record lets you look back on how much youve done. It may also " help when youre feeling frustrated. Reward yourself for success. Even if you dont reach every goal, give yourself credit for what you do get done.  Get support  Encouragement from others can help make losing weight easier. Ask your family members and friends for support. They may even want to join you. Also look to your healthcare provider, registered dietitian, and  for help. Your local hospital can give you more information about nutrition, exercise, and weight loss.  Date Last Reviewed: 1/31/2016  © 9054-9755 [employer] [address] [place] [address]. All rights reserved. This information is not intended as a substitute for professional medical care. Always follow your healthcare professional's instructions.        Walking for Fitness  Fitness walking has something for everyone, even people who are already fit. Walking is one of the safest ways to condition your body aerobically. It can boost energy, help you lose weight, and reduce stress.    Physical benefits  · Walking strengthens your heart and lungs, and tones your muscles.  · When walking, your feet land with less impact than in other sports. This reduces chances of muscle, bone, and joint injury.  · Regular walking improves your cholesterol levels and lowers your risk of heart disease. And it helps you control your blood sugar if you have diabetes.  · Walking is a weight-bearing activity, which helps maintain bone density. This can help prevent osteoporosis.  Personal rewards  · Taking walks can help you relax and manage stress. And fitness walking may make you feel better about yourself.  · Walking can help you sleep better at night and make you less likely to be depressed.  · Regular walking may help maintain your memory as you get older.  · Walking is a great way to spend extra time with friends and family members. Be sure to invite your dog along!  Q&A about fitness walking  Q: Will walking keep me  fit?  A: Yes. Regular walking at the right pace gives you all the benefits of other aerobic activities, such as jogging and swimming.  Q: Will walking help me lose weight and keep it off?  A: Yes. Per mile, walking can burn as many calories as jogging. Your health care provider can help work walking into your weight-loss plan.  Q: Is walking safe for my health?  A: Yes. Walking is safe if you have high blood pressure, diabetes, heart disease, or other conditions. Talk to your health care provider before you start.  Date Last Reviewed: 5/9/2015 © 2000-2016 [employer]. [address] [place] [address], [place] [address]. All rights reserved. This information is not intended as a substitute for professional medical care. Always follow your healthcare professional's instructions.        Diabetes (General Information)  Diabetes is a long-term health problem. It means your body does not make enough insulin. Or it may mean that your body cannot use the insulin it makes. Insulin is a hormone in your body. It lets blood sugar (glucose) reach the cells in your body. All of your cells need glucose for fuel.  When you have diabetes, the glucose in your blood builds up because it cannot get into the cells. This buildup is called high blood sugar (hyperglycemia).  Your blood sugar level depends on several things. It depends on what kind of food you eat and how much of it you eat. It also depends on how much exercise you get, and how much insulin you have in your body. Eating too much of the wrong kinds of food or not taking diabetes medicine on time can cause high blood sugar. Infections can cause high blood sugar even if you are taking medicines correctly.  These things can also cause low blood sugar:  · Missing meals  · Not eating enough food  · Taking too much diabetes medicine  Diabetes can cause serious problems over time if you do not get treated. These problems include heart disease, stroke, kidney failure, and  blindness. They also include nerve pain or loss of feeling in your legs and feet, and gangrene of the feet. By keeping your blood sugar under control you can prevent or delay these problems.  Normal blood sugar levels are 80 to 100 before a meal and less than 180 in the 1 to 2 hours after a meal.  Home care  Follow these guidelines when caring for yourself at home:  · Follow the diet your healthcare provider gives you. Take insulin or other diabetes medicine exactly as told to.  · Watch your blood sugar as you are told to. Keep a log of your results. This will help your provider change your medicines to keep your blood sugar under control.  · Try to reach your ideal weight. You may be able to cut back on or not have to take diabetes medicine if you eat the right foods and get exercise.  · Do not smoke. Smoking worsens the effects of diabetes on your circulation. You are much more likely to have a heart attack if you have diabetes and you smoke.  · Take good care of your feet. If you have lost feeling in your feet, you may not see an injury or infection. Check your feet and between your toes at least once a week.  · Wear a medical alert bracelet or necklace, or carry a card in your wallet that says you have diabetes. This will help healthcare providers give you the right care if you get very ill and cannot tell them that you have diabetes.  Sick day plan  If you get a cold, the flu, or a bacterial or viral infection, take these steps:  · Look at your diabetes sick plan and call your healthcare provider as you were told to. You may need to call your provider right away if:  ¨ Your blood sugar is above 240 while taking your diabetes medicine  ¨ Your urine ketone levels are above normal or high  ¨ You have been vomiting more than 6 hours  ¨ You have trouble breathing or your breath ha s a fruity smell  ¨ You have a high fever  ¨ You have a fever for several days and you are not getting better  ¨ You get light-headed and  are sleepier than usual  · Keep taking your diabetes pills (oral medicine) even if you have been vomiting and are feeling sick. Call your provider right away because you may need insulin to lower your blood sugar until you recover from your illness.  · Keep taking your insulin even if you have been vomiting and are feeling sick. Call your provider right away to ask if you need to change your insulin dose. This will depend on your blood sugar results.  · Check your blood sugar every 2 to 4 hours, or at least 4 times a day.  · Check your ketones often. If you are vomiting and having diarrhea, watch them more often.  · Do not skip meals. Try to eat small meals on a regular schedule. Do this even if you do not feel like eating.  · Drink water or other liquids that do not have caffeine or calories. This will keep you from getting dehydrated. If you are nauseated or vomiting, takes small sips every 5 minutes. To prevent dehydration try to drink a cup (8 ounces) of fluids every hour while you are awake.  General care  Always bring a source of fast-acting sugar with you in case you have symptoms of low blood sugar (below 70). At the first sign of low blood sugar, eat or drink 15 to 20 grams of fast-acting sugar to raise your blood sugar. Examples are:  · 3 to 4 glucose tablets. You can buy these at most drugstores.  · 4 ounces (1/2 cup) of regular (not diet) soft drinks  · 4 ounces (1/2 cup) of any fruit juice  · 8 ounces (1 cup) of milk  · 5 to 6 pieces of hard candy  · 1 tablespoon of honey  Check your blood sugar 15 minutes after treating yourself. If it is still below 70, take 15 to 20 more grams of fast-acting sugar. Test again in 15 minutes. If it returns to normal (70 or above), eat a snack or meal to keep your blood sugar in a safe range. If it stays low, call your doctor or go to an emergency room.  Follow-up care  Follow-up with your healthcare provider, or as advised. For more information about diabetes, visit  the American Diabetes Association website at www.diabetes.org or call 245-698-1795.  When to seek medical advice  Call your healthcare provider right away if you have any of these symptoms of high blood sugar:  · Frequent urination  · Dizziness  · Drowsiness  · Thirst  · Headache  · Nausea or vomiting  · Abdominal pain  · Eyesight changes  · Fast breathing  · Confusion or loss of consciousness  Also call your provider right away if you have any of these signs of low blood sugar:  · Fatigue  · Headache  · Shakes  · Excess sweating  · Hunger  · Feeling anxious or restless  · Eyesight changes  · Drowsiness  · Weakness  · Confusion or loss of consciousness  Call 911  Call for emergency help right away if any of these occur:  · Chest pain or shortness of breath  · Dizziness or fainting  · Weakness of an arm or leg or one side of the face  · Trouble speaking or seeing   Date Last Reviewed: 6/1/2016  © 5521-7888 [employer]. [address] [place] [address]. All rights reserved. This information is not intended as a substitute for professional medical care. Always follow your healthcare professional's instructions.        Controlling High Blood Pressure  High blood pressure (hypertension) is often called the silent killer. This is because many people who have it dont know it. High blood pressure is defined as 140/90 mm Hg or higher. Know your blood pressure and remember to check it regularly. Doing so can save your life. Here are some things you can do to help control your blood pressure.    Choose heart-healthy foods  · Select low-salt, low-fat foods. Limit sodium intake to 2,400 mg per day or the amount suggested by your healthcare provider.  · Limit canned, dried, cured, packaged, and fast foods. These can contain a lot of salt.  · Eat 8 to 10 servings of fruits and vegetables every day.  · Choose lean meats, fish, or chicken.  · Eat whole-grain pasta, brown rice, and beans.  · Eat 2 to 3  servings of low-fat or fat-free dairy products.  · Ask your doctor about the DASH eating plan. This plan helps reduce blood pressure.  · When you go to a restaurant, ask that your meal be prepared with no added salt.  Maintain a healthy weight  · Ask your healthcare provider how many calories to eat a day. Then stick to that number.  · Ask your healthcare provider what weight range is healthiest for you. If you are overweight, a weight loss of only 3% to 5% of your body weight can help lower blood pressure. Generally, a good weight loss goal is to lose 10% of your body weight in a year.  · Limit snacks and sweets.  · Get regular exercise.  Get up and get active  · Choose activities you enjoy. Find ones you can do with friends or family. This includes bicycling, dancing, walking, and jogging.  · Park farther away from building entrances.  · Use stairs instead of the elevator.  · When you can, walk or bike instead of driving.  · [place] leaves, garden, or do household repairs.  · Be active at a moderate to vigorous level of physical activity for at least 40 minutes for a minimum of 3 to 4 days a week.   Manage stress  · Make time to relax and enjoy life. Find time to laugh.  · Communicate your concerns with your loved ones and your healthcare provider.  · Visit with family and friends, and keep up with hobbies.  Limit alcohol and quit smoking  · Men should have no more than 2 drinks per day.  · Women should have no more than 1 drink per day.  · Talk with your healthcare provider about quitting smoking. Smoking significantly increases your risk for heart disease and stroke. Ask your healthcare provider about community smoking cessation programs and other options.  Medicines  If lifestyle changes arent enough, your healthcare provider may prescribe high blood pressure medicine. Take all medicines as prescribed. If you have any questions about your medicines, ask your healthcare provider before stopping or changing them.    Date Last Reviewed: 4/27/2016 © 2000-2016 [employer]. [address] [place] [address], [place] [address]. All rights reserved. This information is not intended as a substitute for professional medical care. Always follow your healthcare professional's instructions.             Language Assistance Services     ATTENTION: Language assistance services are available, free of charge. Please call [phone].      ATENCIÓN: Si habla español, tiene a iglesias disposición servicios gratuitos de asistencia lingüística. Llame al [phone].     CHÚ Ý: N?u b?n nói Ti?ng Vi?t, có các d?ch v? h? tr? ngôn ng? mi?n phí dành cho b?n. G?i s? [phone].         [facility] complies with applicable Federal civil rights laws and does not discriminate on the basis of race, color, national origin, age, disability, or sex.

## 2017-02-23 NOTE — PROGRESS NOTES
Subjective:       Patient ID: Franca Coffey is a 67 y.o. female.    Chief Complaint: Follow-up (HTN)    HPI Comments: Mrs. Coffey is a 67 year old female who presents to clinic for follow up on hypotension. Since last visit, she has continued to take lasix 40 mg daily, despite recommendations to lower dose to 20 mg PRN for lower ext edema. Her blood pressure readings have ranged from 99//86 in the morning and evening readings between 117//93 with heart rate ranging from . She admits to occasional episodes of heart racing, but no chest pain or shortness of breath. She has continued to lose weight, which is intentional. She admits to recent onset of sore throat and ear ache and a sick contact at home. She denies severe sinus pressure, cough, body aches, fever or chills. She is requesting referral to Rheumatology for evaluation and treatment of fibromyalgia.     Review of Systems   Constitutional: Negative for activity change, appetite change, chills, fatigue and fever.   HENT: Positive for ear pain and sore throat. Negative for congestion, ear discharge, nosebleeds, postnasal drip, rhinorrhea, sinus pressure, sneezing, trouble swallowing and voice change.    Eyes: Negative.  Negative for visual disturbance.   Respiratory: Negative for cough, chest tightness, shortness of breath and wheezing.    Cardiovascular: Positive for palpitations and leg swelling. Negative for chest pain.   Gastrointestinal: Negative for abdominal pain, constipation, diarrhea, nausea and vomiting.   Musculoskeletal: Positive for myalgias. Negative for arthralgias.   Allergic/Immunologic: Negative for environmental allergies and immunocompromised state.   Neurological: Positive for light-headedness. Negative for dizziness, syncope, weakness and headaches.   Hematological: Negative for adenopathy.       Objective:      Vitals:    02/23/17 1118   BP: 106/71   Pulse: 86   Temp: 98.9 °F (37.2 °C)     Physical Exam    Constitutional: She is oriented to person, place, and time. She appears well-developed and well-nourished.   HENT:   Head: Normocephalic and atraumatic.   Right Ear: Hearing, tympanic membrane, external ear and ear canal normal.   Left Ear: Hearing, tympanic membrane, external ear and ear canal normal.   Nose: Nose normal.   Mouth/Throat: Oropharynx is clear and moist and mucous membranes are normal.   Eyes: Conjunctivae, EOM and lids are normal. Pupils are equal, round, and reactive to light.   Cardiovascular: Normal rate, regular rhythm, normal heart sounds and intact distal pulses.    Pulmonary/Chest: Effort normal and breath sounds normal.   Lymphadenopathy:     She has no cervical adenopathy.   Neurological: She is alert and oriented to person, place, and time.   Skin: Skin is warm.   Psychiatric: She has a normal mood and affect. Her speech is normal.       Assessment:       1. Essential hypertension    2. Sore throat (viral)    3. Fibromyalgia    4. Obesity (BMI 30.0-34.9)        Plan:       Essential hypertension  -     Restart low dose beta blocker for elevated afternoon BP and tachycardia. Resume metoprolol succinate (TOPROL-XL) 25 MG 24 hr tablet; Take 1 tablet (25 mg total) by mouth once daily.  Dispense: 30 tablet; Refill: 5   -     Decrease furosemide (LASIX) 20 MG tablet ONLY as needed; Take 1 tablet (20 mg total) by mouth once daily.  Dispense: 30 tablet; Refill: 11  - Monitor BP at home and keep a log.  - Keep legs elevated during the day    Sore throat (viral)        - Moisten and Soothe Your Throat  · Try a sip of water first thing after waking up.  · Keep your throat moist by drinking 6 or more glasses of clear liquids every day.  · Run a cool-air humidifier in your room overnight.  · Avoid cigarette smoke.   · Suck on throat lozenges, cough drops, hard candy, ice chips, or frozen fruit-juice bars. Use the sugar-free versions if your diet or medical condition require them.  Gargle to Ease  Irritation  Gargling every hour or 2 can ease irritation. Try gargling with 1 of these solutions:  · 1/4 teaspoon of salt in 1/2 cup of warm water  · An over-the-counter anesthetic gargle  Use Medication for More Relief  Over-the-counter medication can reduce sore throat symptoms. Ask your pharmacist if you have questions about which medication to use:  · Ease pain with anesthetic sprays. Aspirin or an aspirin substitute also helps. Remember, never give aspirin to anyone 18 or younger, or if you are already taking blood thinners.   · For sore throats caused by allergies, try antihistamines to block the allergic reaction.  · Remember: unless a sore throat is caused by a bacterial infection, antibiotics wont help you.  Prevent Future Sore Throats  · Stop smoking or reduce contact with secondhand smoke. Smoke irritates the tender throat lining.  · Limit contact with pets and with allergy-causing substances, such as pollen and mold.  · When youre around someone with a sore throat or cold, wash your hands frequently to keep viruses or bacteria from spreading.  · Dont strain your vocal cords.  ·   Fibromyalgia  -     Ambulatory referral to Rheumatology    Obesity (BMI 30.0-34.9)        - See below    Patient readiness: acceptance and barriers:none    During the course of the visit the patient was educated and counseled about the following:     Diabetes:  Encouraged aerobic exercise.  Hypertension:   Medication: see medication changes above. .  Obesity:   Informal exercise measures discussed, e.g. taking stairs instead of elevator.    Goals: Diabetes: Maintain Hemoglobin A1C below 7, Hypertension: Reduce Blood Pressure and Obesity: Reduce calorie intake and BMI    Did patient meet goals/outcomes: No    The following self management tools provided: blood pressure log    Patient Instructions (the written plan) was given to the patient/family.     Time spent with patient: 30 minutes      Follow up in 4 weeks

## 2017-03-07 ENCOUNTER — OFFICE VISIT (OUTPATIENT)
Dept: ENDOCRINOLOGY | Facility: CLINIC | Age: 67
End: 2017-03-07
Payer: MEDICARE

## 2017-03-07 ENCOUNTER — LAB VISIT (OUTPATIENT)
Dept: LAB | Facility: HOSPITAL | Age: 67
End: 2017-03-07
Attending: INTERNAL MEDICINE
Payer: MEDICARE

## 2017-03-07 VITALS
HEART RATE: 72 BPM | TEMPERATURE: 99 F | RESPIRATION RATE: 18 BRPM | DIASTOLIC BLOOD PRESSURE: 71 MMHG | HEIGHT: 63 IN | SYSTOLIC BLOOD PRESSURE: 119 MMHG | WEIGHT: 177.69 LBS | BODY MASS INDEX: 31.48 KG/M2

## 2017-03-07 DIAGNOSIS — E04.1 NODULAR THYROID DISEASE: ICD-10-CM

## 2017-03-07 DIAGNOSIS — E11.65 TYPE 2 DIABETES MELLITUS WITH HYPERGLYCEMIA, UNSPECIFIED LONG TERM INSULIN USE STATUS: ICD-10-CM

## 2017-03-07 DIAGNOSIS — E78.5 HYPERLIPIDEMIA, UNSPECIFIED HYPERLIPIDEMIA TYPE: ICD-10-CM

## 2017-03-07 DIAGNOSIS — E88.810 DYSMETABOLIC SYNDROME: ICD-10-CM

## 2017-03-07 DIAGNOSIS — E03.9 HYPOTHYROIDISM, UNSPECIFIED TYPE: ICD-10-CM

## 2017-03-07 DIAGNOSIS — K21.9 GASTROESOPHAGEAL REFLUX DISEASE WITHOUT ESOPHAGITIS: ICD-10-CM

## 2017-03-07 DIAGNOSIS — E06.3 HASHIMOTO'S THYROIDITIS: Primary | ICD-10-CM

## 2017-03-07 DIAGNOSIS — E78.00 HYPERCHOLESTEROLEMIA: ICD-10-CM

## 2017-03-07 DIAGNOSIS — M85.80 OSTEOPENIA: ICD-10-CM

## 2017-03-07 DIAGNOSIS — E06.3 HASHIMOTO'S THYROIDITIS: ICD-10-CM

## 2017-03-07 DIAGNOSIS — E66.9 OBESITY (BMI 30-39.9): ICD-10-CM

## 2017-03-07 DIAGNOSIS — F32.A DEPRESSION, UNSPECIFIED DEPRESSION TYPE: ICD-10-CM

## 2017-03-07 DIAGNOSIS — M79.7 FIBROMYALGIA: ICD-10-CM

## 2017-03-07 DIAGNOSIS — Z78.0 POSTMENOPAUSAL: ICD-10-CM

## 2017-03-07 LAB
AMYLASE SERPL-CCNC: 39 U/L
CA-I BLDV-SCNC: 1.25 MMOL/L
CHOLEST/HDLC SERPL: 3.5 {RATIO}
HDL/CHOLESTEROL RATIO: 28.5 %
HDLC SERPL-MCNC: 179 MG/DL
HDLC SERPL-MCNC: 51 MG/DL
LDLC SERPL CALC-MCNC: 106.8 MG/DL
LIPASE SERPL-CCNC: 48 U/L
NONHDLC SERPL-MCNC: 128 MG/DL
PTH-INTACT SERPL-MCNC: 71 PG/ML
T3 SERPL-MCNC: 102 NG/DL
T4 FREE SERPL-MCNC: 1.18 NG/DL
TRIGL SERPL-MCNC: 106 MG/DL
TSH SERPL DL<=0.005 MIU/L-ACNC: 1.17 UIU/ML
URATE SERPL-MCNC: 5.8 MG/DL

## 2017-03-07 PROCEDURE — 3074F SYST BP LT 130 MM HG: CPT | Mod: S$GLB,,, | Performed by: INTERNAL MEDICINE

## 2017-03-07 PROCEDURE — 99214 OFFICE O/P EST MOD 30 MIN: CPT | Mod: S$GLB,,, | Performed by: INTERNAL MEDICINE

## 2017-03-07 PROCEDURE — 83690 ASSAY OF LIPASE: CPT

## 2017-03-07 PROCEDURE — 1157F ADVNC CARE PLAN IN RCRD: CPT | Mod: S$GLB,,, | Performed by: INTERNAL MEDICINE

## 2017-03-07 PROCEDURE — 82308 ASSAY OF CALCITONIN: CPT

## 2017-03-07 PROCEDURE — 84480 ASSAY TRIIODOTHYRONINE (T3): CPT

## 2017-03-07 PROCEDURE — 82985 ASSAY OF GLYCATED PROTEIN: CPT

## 2017-03-07 PROCEDURE — 3078F DIAST BP <80 MM HG: CPT | Mod: S$GLB,,, | Performed by: INTERNAL MEDICINE

## 2017-03-07 PROCEDURE — 99999 PR PBB SHADOW E&M-EST. PATIENT-LVL IV: CPT | Mod: PBBFAC,,, | Performed by: INTERNAL MEDICINE

## 2017-03-07 PROCEDURE — 1125F AMNT PAIN NOTED PAIN PRSNT: CPT | Mod: S$GLB,,, | Performed by: INTERNAL MEDICINE

## 2017-03-07 PROCEDURE — 1159F MED LIST DOCD IN RCRD: CPT | Mod: S$GLB,,, | Performed by: INTERNAL MEDICINE

## 2017-03-07 PROCEDURE — 84550 ASSAY OF BLOOD/URIC ACID: CPT

## 2017-03-07 PROCEDURE — 84443 ASSAY THYROID STIM HORMONE: CPT

## 2017-03-07 PROCEDURE — 82330 ASSAY OF CALCIUM: CPT

## 2017-03-07 PROCEDURE — 1160F RVW MEDS BY RX/DR IN RCRD: CPT | Mod: S$GLB,,, | Performed by: INTERNAL MEDICINE

## 2017-03-07 PROCEDURE — 84378 SUGARS SINGLE QUANT: CPT

## 2017-03-07 PROCEDURE — 3044F HG A1C LEVEL LT 7.0%: CPT | Mod: S$GLB,,, | Performed by: INTERNAL MEDICINE

## 2017-03-07 PROCEDURE — 3060F POS MICROALBUMINURIA REV: CPT | Mod: S$GLB,,, | Performed by: INTERNAL MEDICINE

## 2017-03-07 PROCEDURE — 80061 LIPID PANEL: CPT

## 2017-03-07 PROCEDURE — 83970 ASSAY OF PARATHORMONE: CPT

## 2017-03-07 PROCEDURE — 82150 ASSAY OF AMYLASE: CPT

## 2017-03-07 PROCEDURE — 84439 ASSAY OF FREE THYROXINE: CPT

## 2017-03-07 PROCEDURE — 84432 ASSAY OF THYROGLOBULIN: CPT

## 2017-03-07 PROCEDURE — 83036 HEMOGLOBIN GLYCOSYLATED A1C: CPT

## 2017-03-07 RX ORDER — METFORMIN HYDROCHLORIDE 500 MG/1
500 TABLET ORAL 2 TIMES DAILY WITH MEALS
Qty: 180 TABLET | Refills: 3 | Status: SHIPPED | OUTPATIENT
Start: 2017-03-07 | End: 2017-12-15 | Stop reason: SDUPTHER

## 2017-03-07 NOTE — MR AVS SNAPSHOT
[facility]  [address]  [place] [address]  Phone: [phone]                  Franca Coffey   3/7/2017 11:00 AM   Office Visit    Description:  Female : 1950   Provider:  Bronson Agrawal MD   Department:  [facility]           Reason for Visit     Hashimoto's Thyroiditis     Hypothyroidism           Diagnoses this Visit        Comments    Hashimoto's thyroiditis    -  Primary     Hypothyroidism, unspecified type         Type 2 diabetes mellitus with hyperglycemia, unspecified long term insulin use status         Gastroesophageal reflux disease without esophagitis         Dysmetabolic syndrome         Hyperlipidemia, unspecified hyperlipidemia type         Hypercholesterolemia         Osteopenia         Fibromyalgia         Postmenopausal         Depression, unspecified depression type         Obesity (BMI 30-39.9)         Nodular thyroid disease                To Do List           Future Appointments        Provider Department Dept Phone    3/7/2017 12:00 PM [facility], [place] [facility] - Lab [phone]    3/14/2017 2:00 PM MD [place] [facility] - Cardiology [phone]    3/28/2017 2:00 PM MD [place] [facility] - Rheumatology [phone]    3/28/2017 4:20 PM MD [place] [facility] - Family Medicine [phone]    2017 2:00 PM MD [place] [facility] - [facility]Diabetes [phone]      Goals (5 Years of Data)     None       These Medications        Disp Refills Start End    metformin (GLUCOPHAGE) 500 MG tablet 180 tablet 3 3/7/2017 3/7/2018    Take 1 tablet (500 mg total) by mouth 2 (two) times daily with meals. - Oral    Pharmacy: [facility] [facility] [place] [facility] [address] [facility] Ph #: [phone]         [facility] On [facility]     [facility] On Call Nurse Care Line -  Assistance  Registered nurses in the [facility] On Call Center provide clinical advisement, health education, appointment booking, and  other advisory services.  Call for this free service at [phone].             Medications           Message regarding Medications     Verify the changes and/or additions to your medication regime listed below are the same as discussed with your clinician today.  If any of these changes or additions are incorrect, please notify your healthcare provider.        START taking these NEW medications        Refills    metformin (GLUCOPHAGE) 500 MG tablet 3    Sig: Take 1 tablet (500 mg total) by mouth 2 (two) times daily with meals.    Class: Normal    Route: Oral           Verify that the below list of medications is an accurate representation of the medications you are currently taking.  If none reported, the list may be blank. If incorrect, please contact your healthcare provider. Carry this list with you in case of emergency.           Current Medications     BD ALCOHOL SWABS PadM     chlordiazepoxide-clidinium 5-2.5 mg (LIBRAX) 5-2.5 mg Cap     duloxetine (CYMBALTA) 60 MG capsule TAKE ONE CAPSULE BY MOUTH TWO TIMES DAILY    esomeprazole (NEXIUM) 40 MG capsule TAKE 1 CAPSULE ONE TIME DAILY    furosemide (LASIX) 20 MG tablet Take 1 tablet (20 mg total) by mouth once daily.    gabapentin (NEURONTIN) 300 MG capsule Take 1 capsule (300 mg total) by mouth 2 (two) times daily.    levothyroxine (SYNTHROID) 75 MCG tablet take 1 tablet by mouth BEFORE BREAKFAST    liraglutide 0.6 mg/0.1 mL, 18 mg/3 mL, subq PNIJ (VICTOZA 2-KENJI) 0.6 mg/0.1 mL (18 mg/3 mL) PnIj Inject 1.2 mg into the skin once daily. 0.6 mg after 2 weeks advance to 1.2 mg    metoprolol succinate (TOPROL-XL) 25 MG 24 hr tablet Take 1 tablet (25 mg total) by mouth once daily.    pravastatin (PRAVACHOL) 20 MG tablet Take 1 tablet (20 mg total) by mouth once daily.    topiramate (TOPAMAX) 50 MG tablet Take 1 tablet (50 mg total) by mouth once daily.    TRUE METRIX AIR GLUCOSE METER kit     TRUE METRIX GLUCOSE TEST STRIP Strp     TRUE METRIX LEVEL 1 Soln      "TRUEPLUS LANCETS 28 gauge Misc     metformin (GLUCOPHAGE) 500 MG tablet Take 1 tablet (500 mg total) by mouth 2 (two) times daily with meals.           Clinical Reference Information           Your Vitals Were     BP Pulse Temp Resp    119/71 (BP Location: Right arm, Patient Position: Sitting, BP Method: Automatic) 72 98.5 °F (36.9 °C) (Oral) 18    Height Weight BMI    5' 2.5" (1.588 m) 80.6 kg (177 lb 11.1 oz) 31.98 kg/m2      Blood Pressure          Most Recent Value    BP  119/71      Allergies as of 3/7/2017     Sulfa (Sulfonamide Antibiotics)    Penicillins    Adhesive    Garlic    Morphine      Immunizations Administered on Date of Encounter - 3/7/2017     None      Orders Placed During Today's Visit     Future Labs/Procedures Expected by Expires    Amylase  3/7/2017 5/6/2018    Calcitonin  3/7/2017 5/6/2018    Calcium, ionized  3/7/2017 5/6/2018    Fructosamine  3/7/2017 5/6/2018    GlycoMark (TM)  3/7/2017 5/6/2018    Hemoglobin A1c  3/7/2017 5/6/2018    Lipase  3/7/2017 5/6/2018    Lipid panel  3/7/2017 5/6/2018    Microalbumin/creatinine urine ratio  3/7/2017 5/6/2018    PTH, intact  3/7/2017 5/6/2018    T3  3/7/2017 5/6/2018    T4, free  3/7/2017 5/6/2018    Thyroglobulin  3/7/2017 5/6/2018    TSH  3/7/2017 5/6/2018    Uric acid  3/7/2017 5/6/2018    Urinalysis  3/7/2017 5/6/2018    US Soft Tissue Head Neck Thyroid  6/7/2017 (Approximate) 3/7/2018      Language Assistance Services     ATTENTION: Language assistance services are available, free of charge. Please call [phone].      ATENCIÓN: Si himanshu hollingsworth, tiene a iglesias disposición servicios gratuitos de asistencia lingüística. Llame al [phone].     CHÚ Ý: N?u b?n nói Ti?ng Vi?t, có các d?ch v? h? tr? ngôn ng? mi?n phí dành cho b?n. G?i s? [phone].         [facility] complies with applicable Federal civil rights laws and does not discriminate on the basis of race, color, national origin, age, disability, or sex.        "
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
## 2017-03-08 LAB
ESTIMATED AVG GLUCOSE: 120 MG/DL
HBA1C MFR BLD HPLC: 5.8 %

## 2017-03-09 LAB
CALCIT SERPL-MCNC: <5 PG/ML
FRUCTOSAMINE SERPL-SCNC: 229 UMOL /L (ref 0–285)
THRYOGLOBULIN INTERPRETATION: ABNORMAL
THYROGLOB AB SERPL-ACNC: 25 IU/ML
THYROGLOB SERPL-MCNC: <0.1 NG/ML

## 2017-03-11 LAB — GLYCOMARK (TM): 20.8 UG/ML

## 2017-03-14 ENCOUNTER — OFFICE VISIT (OUTPATIENT)
Dept: CARDIOLOGY | Facility: CLINIC | Age: 67
End: 2017-03-14
Payer: MEDICARE

## 2017-03-14 VITALS
HEART RATE: 79 BPM | SYSTOLIC BLOOD PRESSURE: 152 MMHG | BODY MASS INDEX: 31.4 KG/M2 | WEIGHT: 170.63 LBS | HEIGHT: 62 IN | OXYGEN SATURATION: 97 % | DIASTOLIC BLOOD PRESSURE: 77 MMHG

## 2017-03-14 DIAGNOSIS — I10 ESSENTIAL HYPERTENSION: ICD-10-CM

## 2017-03-14 DIAGNOSIS — E65 ABDOMINAL OBESITY: ICD-10-CM

## 2017-03-14 DIAGNOSIS — E66.9 OBESITY (BMI 30-39.9): ICD-10-CM

## 2017-03-14 DIAGNOSIS — E78.00 HYPERCHOLESTEROLEMIA: ICD-10-CM

## 2017-03-14 DIAGNOSIS — Z91.89 CARDIOVASCULAR EVENT RISK: ICD-10-CM

## 2017-03-14 DIAGNOSIS — G47.33 OSA ON CPAP: ICD-10-CM

## 2017-03-14 DIAGNOSIS — E11.65 TYPE 2 DIABETES MELLITUS WITH HYPERGLYCEMIA, WITHOUT LONG-TERM CURRENT USE OF INSULIN: ICD-10-CM

## 2017-03-14 DIAGNOSIS — Z82.49 FAMILY HISTORY OF PREMATURE CAD: ICD-10-CM

## 2017-03-14 DIAGNOSIS — R06.09 DOE (DYSPNEA ON EXERTION): Primary | ICD-10-CM

## 2017-03-14 PROBLEM — N20.0 KIDNEY STONE ON RIGHT SIDE: Status: ACTIVE | Noted: 2017-03-14

## 2017-03-14 PROBLEM — K58.2 IRRITABLE BOWEL SYNDROME WITH BOTH CONSTIPATION AND DIARRHEA: Status: ACTIVE | Noted: 2017-03-14

## 2017-03-14 PROBLEM — R42 VERTIGO: Status: ACTIVE | Noted: 2017-03-14

## 2017-03-14 PROCEDURE — 1125F AMNT PAIN NOTED PAIN PRSNT: CPT | Mod: S$GLB,,, | Performed by: INTERNAL MEDICINE

## 2017-03-14 PROCEDURE — 1160F RVW MEDS BY RX/DR IN RCRD: CPT | Mod: S$GLB,,, | Performed by: INTERNAL MEDICINE

## 2017-03-14 PROCEDURE — 99999 PR PBB SHADOW E&M-EST. PATIENT-LVL III: CPT | Mod: PBBFAC,,, | Performed by: INTERNAL MEDICINE

## 2017-03-14 PROCEDURE — 3044F HG A1C LEVEL LT 7.0%: CPT | Mod: S$GLB,,, | Performed by: INTERNAL MEDICINE

## 2017-03-14 PROCEDURE — 3077F SYST BP >= 140 MM HG: CPT | Mod: S$GLB,,, | Performed by: INTERNAL MEDICINE

## 2017-03-14 PROCEDURE — 1159F MED LIST DOCD IN RCRD: CPT | Mod: S$GLB,,, | Performed by: INTERNAL MEDICINE

## 2017-03-14 PROCEDURE — 99205 OFFICE O/P NEW HI 60 MIN: CPT | Mod: S$GLB,,, | Performed by: INTERNAL MEDICINE

## 2017-03-14 PROCEDURE — 3060F POS MICROALBUMINURIA REV: CPT | Mod: S$GLB,,, | Performed by: INTERNAL MEDICINE

## 2017-03-14 PROCEDURE — 1157F ADVNC CARE PLAN IN RCRD: CPT | Mod: S$GLB,,, | Performed by: INTERNAL MEDICINE

## 2017-03-14 PROCEDURE — 3078F DIAST BP <80 MM HG: CPT | Mod: S$GLB,,, | Performed by: INTERNAL MEDICINE

## 2017-03-14 RX ORDER — ATORVASTATIN CALCIUM 40 MG/1
40 TABLET, FILM COATED ORAL DAILY
Qty: 30 TABLET | Refills: 11 | Status: SHIPPED | OUTPATIENT
Start: 2017-03-14 | End: 2017-12-04 | Stop reason: SDUPTHER

## 2017-03-14 NOTE — PROGRESS NOTES
Subjective:    Patient ID:  Franca Coffey is a 67 y.o. female who presents for evaluation of No chief complaint on file.  For CORONADO and premature family history, atherosclerosis on abdominal X-ray  Self referred  PCP: Dr. Chowdhury, see annually  Endocrine: Dr. Agrawal  GI: Dr. Thomas  Lives with , Rip, non-smoker  Retired from elementary teaching    Patient is a new patient to me.     HPI Comments: WF with negative past cardiac history but concern about premature family history with brother with MI in his 40s and mother's first MI in her 40s and  of MI at age 50. Over the past 2 months have noted increase in CORONADO, especially with lifting and carrying laundry. The BP medications, metoprolol and Avapro was stopped about 2 months ago due to low BP after weight loss 25-30 lbs, recall SBP of 107 with drowsiness. Symptom improved after the change but fast heart done 4 weeks after and was placed back on low dose metoprolol. Thyroid level also dropped during this time. The CORONADO did improve some after restart of BB. ECG shows NSR, rate 71, non-specific STT abnormalities. Chart reviewed, lipid in 10/2016, off statin, shows LDL of 150, ASCVD 10-year event risk of 22.9%. No prior cardiac testing, recall stress test 10 years ago. Was told by chiropractor that abdominal Xray shows atherosclerotic disease. Recent labs, A1C 5.8%, LDL on 20 mg of Pravastatin is 107, non-, discussed goal of treatment.      Review of Systems   Constitution: Positive for chills, decreased appetite, diaphoresis, malaise/fatigue, night sweats and weight loss (43 lbs over past 6 months, intentional). Negative for fever, weakness and weight gain.   HENT: Positive for headaches, hearing loss (left), odynophagia and tinnitus. Negative for nosebleeds.    Eyes: Positive for blurred vision, discharge, pain, visual disturbance and visual halos.   Cardiovascular: Positive for chest pain (occasional, no inciting factor), dyspnea on  exertion and palpitations. Negative for claudication, cyanosis, irregular heartbeat, leg swelling, near-syncope, orthopnea and paroxysmal nocturnal dyspnea.   Respiratory: Positive for shortness of breath. Negative for cough, sleep disturbances due to breathing, snoring and wheezing.    Endocrine: Positive for cold intolerance, heat intolerance, polydipsia and polyphagia. Negative for polyuria.   Hematologic/Lymphatic: Does not bruise/bleed easily.   Skin: Positive for dry skin and itching. Negative for color change, flushing, nail changes, poor wound healing and suspicious lesions.   Musculoskeletal: Positive for arthritis, back pain, falls, joint pain, joint swelling, muscle cramps, myalgias and stiffness. Negative for gout and muscle weakness.   Gastrointestinal: Positive for bloating, abdominal pain, change in bowel habit, dysphagia and heartburn. Negative for hematemesis, hematochezia, melena and nausea.        IBS     Genitourinary: Positive for flank pain and pelvic pain.   Neurological: Positive for difficulty with concentration, disturbances in coordination, excessive daytime sleepiness, dizziness, light-headedness, loss of balance, numbness, paresthesias and vertigo. Negative for focal weakness.   Psychiatric/Behavioral: Positive for depression, hallucinations and memory loss. Negative for substance abuse. The patient is nervous/anxious. The patient does not have insomnia.    Allergic/Immunologic: Positive for hives.        Objective:    Physical Exam   Constitutional: She is oriented to person, place, and time. She appears well-developed and well-nourished.   HENT:   Head: Normocephalic.   Eyes: Conjunctivae and EOM are normal. Pupils are equal, round, and reactive to light.   Neck: Normal range of motion. Neck supple. No JVD present. No thyromegaly present.   Cardiovascular: Normal rate, regular rhythm, normal heart sounds and intact distal pulses.  Exam reveals no gallop and no friction rub.    No  "murmur heard.  Pulmonary/Chest: Effort normal and breath sounds normal. She has no rales. She exhibits no tenderness.   Abdominal: Soft. Bowel sounds are normal. There is no tenderness.   Waist 39.5"   Musculoskeletal: Normal range of motion. She exhibits no edema.   Lymphadenopathy:     She has no cervical adenopathy.   Neurological: She is alert and oriented to person, place, and time.   Skin: Skin is warm and dry. No rash noted.         Assessment:       1. CORONADO (dyspnea on exertion), onset 1/2017    2. Family history of premature CAD    3. Obesity (BMI 30-39.9), today 31.2    4. Cardiovascular event risk, ASCVD 10-year risk 22.9%, 2016    5. Type 2 diabetes mellitus with hyperglycemia, without long-term current use of insulin    6. Essential hypertension, onset 2007    7. Hypercholesterolemia, baseline     8. REYES on CPAP, use 100%    9. Abdominal obesity         Plan:       Diagnoses and all orders for this visit:    CORONADO (dyspnea on exertion), onset 1/2017  -     Exercise stress echo with color flow; Future    Family history of premature CAD  -     atorvastatin (LIPITOR) 40 MG tablet; Take 1 tablet (40 mg total) by mouth once daily.    Obesity (BMI 30-39.9), today 31.2    Cardiovascular event risk, ASCVD 10-year risk 22.9%, 2016  -     Exercise stress echo with color flow; Future  -     atorvastatin (LIPITOR) 40 MG tablet; Take 1 tablet (40 mg total) by mouth once daily.  -     Lipid panel; Future    Type 2 diabetes mellitus with hyperglycemia, without long-term current use of insulin    Essential hypertension, onset 2007  -     Exercise stress echo with color flow; Future  -     Lipid panel; Future    Hypercholesterolemia, baseline   -     atorvastatin (LIPITOR) 40 MG tablet; Take 1 tablet (40 mg total) by mouth once daily.    REYES on CPAP, use 100%    Abdominal obesity    - Instruction for Mediterranean diet and heart healthy exercise given.  - Highly recommend 30 minutes of exercise daily, can " have Sunday off, with 2-3 sessions of muscle strengthening weekly. A  would be very helpful.  - Check home blood pressure, 2 days weekly, do 2 readings within 5 minutes in AM and PM, keep log for review.  - Weigh twice weekly, try to lose 1-2 lbs per week  - phone review / [facility]   - Will follow up in 3 months to check efficacy, repeat lipid panel    Patient Active Problem List   Diagnosis    Abdominal pain, generalized    Depression    Anxiety    GERD (gastroesophageal reflux disease)    Fibromyalgia    Heartburn    Female dyspareunia    Pain in joint, lower leg    Vaginal erosion due to surgical mesh    Hypothyroidism    Good hypertension control    Migraine    Exposure of implanted vaginal mesh and prosthetic material in vagina    Lumbar spondylosis    DDD (degenerative disc disease), lumbar    Obesity, Class II, BMI 35-39.9, with comorbidity    BMI 36.0-36.9,adult    Thyroid disease    Thyroiditis    Goiter    Abnormal thyroid blood test    Essential hypertension, onset 2007    Dysmetabolic syndrome    Osteopenia    Hashimoto's thyroiditis    Left knee pain    Pain due to total left knee replacement    Dysphagia    Postmenopausal    Type 2 diabetes mellitus with hyperglycemia, onset 2016    Hypercholesterolemia, baseline     Obesity (BMI 30-39.9), today 31.2    Family history of premature CAD    CORONADO (dyspnea on exertion), onset 1/2017    Cardiovascular event risk, ASCVD 10-year risk 22.9%, 2016    Kidney stone on right side    Vertigo    REYES on CPAP, use 100%    Abdominal obesity    Irritable bowel syndrome with both constipation and diarrhea     Total face-to-face time with the patient was 50 minutes and greater than 50% was spent in counseling and coordination of care. The above assessment and plan have been discussed at length. Labs and procedure over the last 6 months reviewed. Problem List updated. Asked to bring in all active medications /  pills bottles with next visit.

## 2017-03-14 NOTE — MR AVS SNAPSHOT
[facility]  [address]  [place] [address]  Phone: [phone]                  Franca Coffey   3/14/2017 2:00 PM   Office Visit    Description:  Female : 1950   Provider:  Koby Mcdaniels MD   Department:  [facility]           Diagnoses this Visit        Comments    CORONADO (dyspnea on exertion)    -  Primary     Family history of premature CAD         Obesity (BMI 30-39.9)         Cardiovascular event risk         Type 2 diabetes mellitus with hyperglycemia, without long-term current use of insulin         Essential hypertension         Hypercholesterolemia         REYES on CPAP         Abdominal obesity                To Do List           Future Appointments        Provider Department Dept Phone    3/28/2017 2:00 PM MD [facility] Chen - Rheumatology [phone]    3/28/2017 4:20 PM MD [facility] Qu[facility]o - Family Medicine [phone]    2017 9:30 AM LAB, [facility] [address] [facility] [phone]    2017 1:00 PM MD [facility] [phone]    2017 2:00 PM Bronson Agrawal MD [place] - Endo/Diabetes [phone]      Goals (5 Years of Data)     None      Follow-Up and Disposition     Return in about 3 months (around 2017) for lipid panel.    Follow-up and Disposition History       These Medications        Disp Refills Start End    atorvastatin (LIPITOR) 40 MG tablet 30 tablet 11 3/14/2017 3/14/2018    Take 1 tablet (40 mg total) by mouth once daily. - Oral    Pharmacy: [facility] [place] [address] [place] - [address] Ph #: [phone]         [facility]     [facility] -  Assistance  Registered nurses in the [facility] provide clinical advisement, health education, appointment booking, and other advisory services.  Call for this free service at [phone].             Medications           Message  regarding Medications     Verify the changes and/or additions to your medication regime listed below are the same as discussed with your clinician today.  If any of these changes or additions are incorrect, please notify your healthcare provider.        START taking these NEW medications        Refills    atorvastatin (LIPITOR) 40 MG tablet 11    Sig: Take 1 tablet (40 mg total) by mouth once daily.    Class: Normal    Route: Oral      STOP taking these medications     pravastatin (PRAVACHOL) 20 MG tablet Take 1 tablet (20 mg total) by mouth once daily.           Verify that the below list of medications is an accurate representation of the medications you are currently taking.  If none reported, the list may be blank. If incorrect, please contact your healthcare provider. Carry this list with you in case of emergency.           Current Medications     BD ALCOHOL SWABS PadM     chlordiazepoxide-clidinium 5-2.5 mg (LIBRAX) 5-2.5 mg Cap     duloxetine (CYMBALTA) 60 MG capsule TAKE ONE CAPSULE BY MOUTH TWO TIMES DAILY    esomeprazole (NEXIUM) 40 MG capsule TAKE 1 CAPSULE ONE TIME DAILY    furosemide (LASIX) 20 MG tablet Take 1 tablet (20 mg total) by mouth once daily.    gabapentin (NEURONTIN) 300 MG capsule Take 1 capsule (300 mg total) by mouth 2 (two) times daily.    levothyroxine (SYNTHROID) 75 MCG tablet take 1 tablet by mouth BEFORE BREAKFAST    liraglutide 0.6 mg/0.1 mL, 18 mg/3 mL, subq PNIJ (VICTOZA 2-KENJI) 0.6 mg/0.1 mL (18 mg/3 mL) PnIj Inject 1.2 mg into the skin once daily. 0.6 mg after 2 weeks advance to 1.2 mg    metformin (GLUCOPHAGE) 500 MG tablet Take 1 tablet (500 mg total) by mouth 2 (two) times daily with meals.    metoprolol succinate (TOPROL-XL) 25 MG 24 hr tablet Take 1 tablet (25 mg total) by mouth once daily.    TRUE METRIX AIR GLUCOSE METER kit     TRUE METRIX GLUCOSE TEST STRIP Strp     TRUE METRIX LEVEL 1 Soln     TRUEPLUS LANCETS 28 gauge Misc     atorvastatin (LIPITOR) 40 MG tablet Take 1  "tablet (40 mg total) by mouth once daily.    topiramate (TOPAMAX) 50 MG tablet Take 1 tablet (50 mg total) by mouth once daily.           Clinical Reference Information           Your Vitals Were     BP Pulse Height Weight SpO2 BMI    152/77 79 5' 2" (1.575 m) 77.4 kg (170 lb 10.2 oz) 97% 31.21 kg/m2      Blood Pressure          Most Recent Value    Right Arm BP - Sitting  153/76    Left Arm BP - Sitting  152/77    BP  (!)  152/77      Allergies as of 3/14/2017     Sulfa (Sulfonamide Antibiotics)    Penicillins    Adhesive    Garlic    Morphine      Immunizations Administered on Date of Encounter - 3/14/2017     None      Orders Placed During Today's Visit     Future Labs/Procedures Expected by Expires    Lipid panel  6/14/2017 5/13/2018    Exercise stress echo with color flow  As directed 3/14/2018      Instructions    Recommended Mediterranean dietEating Heart-Healthy Food: Using the DASH Plan  Eating for your heart doesnt have to be hard or boring. You just need to know how to make healthier choices. The DASH eating plan has been developed to help you do just that. DASH stands for Dietary Approaches to Stop Hypertension. It is a plan that has been proven to be healthier for your heart and to lower your risk for high blood pressure. It can also help lower your risk for cancer, heart disease, osteoporosis, and diabetes.  Choosing from Each Food Group  Choose foods from each of the food groups below each day. Try to get the recommended number of servings for each food group. The serving numbers are based on a diet of 2,000 calories a day. Talk to your doctor if youre unsure about your calorie needs.  Grains   Servings: 7-8 a day  A serving is:  · 1 slice bread  · 1 ounce dry cereal  · half a cup cooked rice or pasta  Best choices: Whole grains and any grains high in fiber.  Vegetables   Servings: 4-5 a day  A serving is:  · 1 cup raw leafy vegetable  · Half a cup cooked vegetable  · Three-quarter cup vegetable " juice  Best choices: Fresh or frozen vegetable prepared without too much added salt or fat.    Fruits   Servings: 4-5 a day  A serving is:  · Three-quarter cup fruit juice  · 1 medium fruit  · One-quarter cup dried fruit  · One-half cup fresh, frozen, or canned fruit  Best choices: A variety of fresh fruits of different colors. Whole fruits are a much better choice than fruit juices.  Low-fat or Fat Free Dairy   Servings: 2-3 a day  A serving is:  · 8 ounces milk  · 1 cup yogurt  · One and a half ounces cheese  Best choices: Skim or 1% milk, low-fat or fat free yogurt or buttermilk, and low-fat cheeses.       Meat, Poultry, Fish   Servings: 2 or fewer a day  A serving is:  · 3 ounces cooked meat, poultry, or fish  Best choices: Lean meats and fish. Trim away visible fat. Broil, roast, or boil instead of frying. Remove skin from poultry before eating.  Nuts, Seeds, Beans   Servings: 4-5 a week  A serving is:  · One third cup nuts (or one and a half ounces)  · 2 tablespoons sunflower seeds  · Half a cup cooked beans  Best choices: Dry roasted nuts with no salt added, lentils, kidney beans, garbanzo beans, and whole moreno beans.    Fats and Oils   Servings: 2 a day  A serving is:  · 1 teaspoon vegetable oil  · 1 teaspoon soft margarine  · 1 tablespoon low-fat mayonnaise  · 1 teaspoon regular mayonnaise  · 2 tablespoons light salad dressing  · 1 tablespoon regular salad dressing  Best choices: Monounsaturated and polyunsaturated fats such as olive, canola, or safflower oil.  Sweets   Servings: 5 a week or fewer  A serving is:  · 1 tablespoon sugar, maple syrup, or honey  · 1 tablespoon jam or jelly  · 1 half-ounce jelly beans (about 15)  · 8 ounces lemonade  Best choices: Dried fruit can be a satisfying sweet. Choose low-fat sweets when possible. And watch your serving sizes!       Aerobic Exercise for a Healthy Heart  Exercise is a lot more than an energy booster and a stress reliever. It also strengthens your heart  muscle, lowers your blood pressure and blood cholesterol, and burns calories.      Remember, some activity is better than none.     Choose an Aerobic Activity  Choose a nonstop activity that makes your heart and lungs work harder than they do when you rest or walk normally. This aerobic exercise can improve the way your heart and other muscles use oxygen. Make it fun by exercising with a friend and choosing an activity you enjoy. Here are some ideas:  · Walking  · Swimming  · Bicycling  · Stair climbing  · Dancing  · Jogging  Exercise Regularly  If you havent been exercising regularly,  get your doctors okay first. Then start slowly.  Here are some tips:  · Begin exercising 3 times a week for 5-10 minutes at a time.  · When you feel comfortable, add a few minutes each week.  · Slowly build up to exercising 3-4 times each week for 20-40 minutes. Aim for a total of 150 or more minutes a week.  · Be sure to carry your nitroglycerin with you when you exercise.  · If you get angina when youre exercising, stop what youre doing, take your nitroglycerin, and call your doctor.  © 5049-6566 [facility], [address] [place] [address]. All rights reserved. This information is not intended as a substitute for professional medical care. Always follow your healthcare professional's instructions.  Losing Weight (Cardiovascular)  Excess weight is a major risk factor for heart disease. Losing weight may help keep your arteries open so that your heart can get the oxygen-rich blood it needs. Weight loss can also help lower your blood pressure and reduce your risk for diabetes. All in all, losing weight makes you healthier.          Exercise with a friend. When activity is fun, you're more likely to stick with it.        Calories and Weight Loss  Calories are the fuel your body burns for energy. You get the calories you need from the food you eat. For healthy weight loss, women should eat at least 1,200 calories a  day, men at least 1,500.    When you eat more calories than you need, your body stores the extra calories as fat. One pound of fat equals 3,500 calories.    To lose weight, try to burn 500 calories a day more than you eat. To do this, eat 250 calories less each day. Add activity to burn the other 250 calories. Walking 21/2 miles burns about 250 calories.    Eat a variety of healthy foods. Its the best way to make calories count.     Tips for losing weight:  Drink 8 to 10 glasses of water a day.    Dont skip meals. Instead, eat smaller portions.       Brisk Activity Is Best  Brisk activity gets your heart pumping faster. It makes your heart healthier. Its also the best way to burn calories. In fact, your body may keep burning calories for hours after you stop a brisk activity.    Begin by walking 10 minutes most days.    Add more time and speed to your walk. Build up as you feel able.    Try to walk briskly at least 30 minutes most days. If needed, you can break this into 2 shorter sessions.     Check off the ideas below that you could try to make your day more active:    Take the stairs instead of the elevator.    Park your car farther away and walk.    Ride a bike to work or to the store.    Walk laps around the mall.       Language Assistance Services     ATTENTION: Language assistance services are available, free of charge. Please call [phone].      ATENCIÓN: Si habla edel, tiene a iglesias disposición servicios gratuitos de asistencia lingüística. Llame al [phone].     CHÚ Ý: N?u b?n nói Ti?ng Vi?t, có các d?ch v? h? tr? ngôn ng? mi?n phí dành cho b?n. G?i s? [phone].         [facility] complies with applicable Federal civil rights laws and does not discriminate on the basis of race, color, national origin, age, disability, or sex.

## 2017-03-14 NOTE — PATIENT INSTRUCTIONS

## 2017-03-20 ENCOUNTER — DOCUMENTATION ONLY (OUTPATIENT)
Dept: FAMILY MEDICINE | Facility: CLINIC | Age: 67
End: 2017-03-20

## 2017-03-20 NOTE — PROGRESS NOTES
Pre-Visit Chart Review  For Appointment Scheduled on 3-28-17    Health Maintenance Due   Topic Date Due    Foot Exam  01/29/1960    Zoster Vaccine  01/29/2008

## 2017-03-22 ENCOUNTER — HOSPITAL ENCOUNTER (OUTPATIENT)
Dept: CARDIOLOGY | Facility: HOSPITAL | Age: 67
Discharge: HOME | End: 2017-03-22
Attending: INTERNAL MEDICINE
Payer: MEDICARE

## 2017-03-22 DIAGNOSIS — I10 ESSENTIAL HYPERTENSION: ICD-10-CM

## 2017-03-22 DIAGNOSIS — Z91.89 CARDIOVASCULAR EVENT RISK: ICD-10-CM

## 2017-03-22 DIAGNOSIS — R06.09 DOE (DYSPNEA ON EXERTION): ICD-10-CM

## 2017-03-22 LAB
AORTIC VALVE REGURGITATION: NORMAL
DIASTOLIC DYSFUNCTION: NO
ESTIMATED PA SYSTOLIC PRESSURE: 28
MITRAL VALVE REGURGITATION: NORMAL
RETIRED EF AND QEF - SEE NOTES: 60 (ref 55–65)
TRICUSPID VALVE REGURGITATION: NORMAL

## 2017-03-22 PROCEDURE — 93325 DOPPLER ECHO COLOR FLOW MAPG: CPT | Mod: 26,,, | Performed by: INTERNAL MEDICINE

## 2017-03-22 PROCEDURE — 93325 DOPPLER ECHO COLOR FLOW MAPG: CPT

## 2017-03-22 PROCEDURE — 93351 STRESS TTE COMPLETE: CPT | Mod: 26,,, | Performed by: INTERNAL MEDICINE

## 2017-03-22 PROCEDURE — 93320 DOPPLER ECHO COMPLETE: CPT | Mod: 26,,, | Performed by: INTERNAL MEDICINE

## 2017-03-28 ENCOUNTER — LAB VISIT (OUTPATIENT)
Dept: LAB | Facility: HOSPITAL | Age: 67
End: 2017-03-28
Attending: INTERNAL MEDICINE
Payer: MEDICARE

## 2017-03-28 ENCOUNTER — OFFICE VISIT (OUTPATIENT)
Dept: FAMILY MEDICINE | Facility: CLINIC | Age: 67
End: 2017-03-28
Payer: MEDICARE

## 2017-03-28 ENCOUNTER — INITIAL CONSULT (OUTPATIENT)
Dept: RHEUMATOLOGY | Facility: CLINIC | Age: 67
End: 2017-03-28
Payer: MEDICARE

## 2017-03-28 VITALS
WEIGHT: 175.06 LBS | RESPIRATION RATE: 24 BRPM | OXYGEN SATURATION: 97 % | TEMPERATURE: 98 F | SYSTOLIC BLOOD PRESSURE: 138 MMHG | BODY MASS INDEX: 32.22 KG/M2 | HEART RATE: 77 BPM | HEIGHT: 62 IN | DIASTOLIC BLOOD PRESSURE: 83 MMHG

## 2017-03-28 VITALS
BODY MASS INDEX: 32.17 KG/M2 | HEIGHT: 62 IN | SYSTOLIC BLOOD PRESSURE: 136 MMHG | DIASTOLIC BLOOD PRESSURE: 82 MMHG | HEART RATE: 77 BPM | WEIGHT: 174.81 LBS

## 2017-03-28 DIAGNOSIS — E11.9 CONTROLLED TYPE 2 DIABETES MELLITUS WITHOUT COMPLICATION, WITHOUT LONG-TERM CURRENT USE OF INSULIN: ICD-10-CM

## 2017-03-28 DIAGNOSIS — E78.5 HYPERLIPIDEMIA, UNSPECIFIED HYPERLIPIDEMIA TYPE: ICD-10-CM

## 2017-03-28 DIAGNOSIS — E11.59 HYPERTENSION ASSOCIATED WITH DIABETES: ICD-10-CM

## 2017-03-28 DIAGNOSIS — T84.84XD PAIN DUE TO TOTAL LEFT KNEE REPLACEMENT, SUBSEQUENT ENCOUNTER: ICD-10-CM

## 2017-03-28 DIAGNOSIS — M35.00 SICCA COMPLEX: ICD-10-CM

## 2017-03-28 DIAGNOSIS — E03.9 HYPOTHYROIDISM, UNSPECIFIED TYPE: ICD-10-CM

## 2017-03-28 DIAGNOSIS — I15.2 HYPERTENSION ASSOCIATED WITH DIABETES: ICD-10-CM

## 2017-03-28 DIAGNOSIS — I10 ESSENTIAL HYPERTENSION: ICD-10-CM

## 2017-03-28 DIAGNOSIS — E78.5 HYPERLIPIDEMIA ASSOCIATED WITH TYPE 2 DIABETES MELLITUS: ICD-10-CM

## 2017-03-28 DIAGNOSIS — M25.50 POLYARTHRALGIA: ICD-10-CM

## 2017-03-28 DIAGNOSIS — I10 GOOD HYPERTENSION CONTROL: Primary | ICD-10-CM

## 2017-03-28 DIAGNOSIS — Z96.652 PAIN DUE TO TOTAL LEFT KNEE REPLACEMENT, SUBSEQUENT ENCOUNTER: ICD-10-CM

## 2017-03-28 DIAGNOSIS — M79.7 FIBROMYALGIA: ICD-10-CM

## 2017-03-28 DIAGNOSIS — M79.10 MYALGIA: ICD-10-CM

## 2017-03-28 DIAGNOSIS — E11.69 HYPERLIPIDEMIA ASSOCIATED WITH TYPE 2 DIABETES MELLITUS: ICD-10-CM

## 2017-03-28 DIAGNOSIS — R53.83 FATIGUE, UNSPECIFIED TYPE: ICD-10-CM

## 2017-03-28 DIAGNOSIS — Z79.1 NSAID LONG-TERM USE: ICD-10-CM

## 2017-03-28 DIAGNOSIS — M79.672 LEFT FOOT PAIN: ICD-10-CM

## 2017-03-28 DIAGNOSIS — M25.50 POLYARTHRALGIA: Primary | ICD-10-CM

## 2017-03-28 LAB — CK SERPL-CCNC: 70 U/L

## 2017-03-28 PROCEDURE — 3079F DIAST BP 80-89 MM HG: CPT | Mod: S$GLB,,, | Performed by: FAMILY MEDICINE

## 2017-03-28 PROCEDURE — 4010F ACE/ARB THERAPY RXD/TAKEN: CPT | Mod: S$GLB,,, | Performed by: FAMILY MEDICINE

## 2017-03-28 PROCEDURE — 3079F DIAST BP 80-89 MM HG: CPT | Mod: S$GLB,,, | Performed by: INTERNAL MEDICINE

## 2017-03-28 PROCEDURE — 1159F MED LIST DOCD IN RCRD: CPT | Mod: S$GLB,,, | Performed by: INTERNAL MEDICINE

## 2017-03-28 PROCEDURE — 3075F SYST BP GE 130 - 139MM HG: CPT | Mod: S$GLB,,, | Performed by: FAMILY MEDICINE

## 2017-03-28 PROCEDURE — 1160F RVW MEDS BY RX/DR IN RCRD: CPT | Mod: S$GLB,,, | Performed by: INTERNAL MEDICINE

## 2017-03-28 PROCEDURE — 99214 OFFICE O/P EST MOD 30 MIN: CPT | Mod: S$GLB,,, | Performed by: FAMILY MEDICINE

## 2017-03-28 PROCEDURE — 99999 PR PBB SHADOW E&M-EST. PATIENT-LVL III: CPT | Mod: PBBFAC,,, | Performed by: INTERNAL MEDICINE

## 2017-03-28 PROCEDURE — 1157F ADVNC CARE PLAN IN RCRD: CPT | Mod: S$GLB,,, | Performed by: INTERNAL MEDICINE

## 2017-03-28 PROCEDURE — 3075F SYST BP GE 130 - 139MM HG: CPT | Mod: S$GLB,,, | Performed by: INTERNAL MEDICINE

## 2017-03-28 PROCEDURE — 99999 PR PBB SHADOW E&M-EST. PATIENT-LVL III: CPT | Mod: PBBFAC,,, | Performed by: FAMILY MEDICINE

## 2017-03-28 PROCEDURE — 99204 OFFICE O/P NEW MOD 45 MIN: CPT | Mod: S$GLB,,, | Performed by: INTERNAL MEDICINE

## 2017-03-28 PROCEDURE — 1157F ADVNC CARE PLAN IN RCRD: CPT | Mod: S$GLB,,, | Performed by: FAMILY MEDICINE

## 2017-03-28 PROCEDURE — 3044F HG A1C LEVEL LT 7.0%: CPT | Mod: S$GLB,,, | Performed by: FAMILY MEDICINE

## 2017-03-28 PROCEDURE — 1159F MED LIST DOCD IN RCRD: CPT | Mod: S$GLB,,, | Performed by: FAMILY MEDICINE

## 2017-03-28 PROCEDURE — 1160F RVW MEDS BY RX/DR IN RCRD: CPT | Mod: S$GLB,,, | Performed by: FAMILY MEDICINE

## 2017-03-28 PROCEDURE — 1125F AMNT PAIN NOTED PAIN PRSNT: CPT | Mod: S$GLB,,, | Performed by: FAMILY MEDICINE

## 2017-03-28 PROCEDURE — 1125F AMNT PAIN NOTED PAIN PRSNT: CPT | Mod: S$GLB,,, | Performed by: INTERNAL MEDICINE

## 2017-03-28 RX ORDER — LISINOPRIL 2.5 MG/1
2.5 TABLET ORAL DAILY
Qty: 90 TABLET | Refills: 3 | Status: SHIPPED | OUTPATIENT
Start: 2017-03-28 | End: 2017-12-07 | Stop reason: SDUPTHER

## 2017-03-28 RX ORDER — NAPROXEN 500 MG/1
500 TABLET ORAL 2 TIMES DAILY
Qty: 60 TABLET | Refills: 2 | Status: SHIPPED | OUTPATIENT
Start: 2017-03-28 | End: 2017-07-17 | Stop reason: SDUPTHER

## 2017-03-28 RX ORDER — GABAPENTIN 100 MG/1
100 CAPSULE ORAL 3 TIMES DAILY
Qty: 90 CAPSULE | Refills: 2 | Status: SHIPPED | OUTPATIENT
Start: 2017-03-28 | End: 2017-06-19 | Stop reason: SDUPTHER

## 2017-03-28 ASSESSMENT — ROUTINE ASSESSMENT OF PATIENT INDEX DATA (RAPID3)
AM STIFFNESS SCORE: 1, YES
FATIGUE SCORE: 10
PAIN SCORE: 9
PSYCHOLOGICAL DISTRESS SCORE: 3.3
WHEN YOU AWAKENED IN THE MORNING OVER THE LAST WEEK, PLEASE INDICATE THE AMOUNT OF TIME IT TAKES UNTIL YOU ARE AS LIMBER AS YOU WILL BE FOR THE DAY: SEVERAL HOURS
TOTAL RAPID3 SCORE: 6.22
PATIENT GLOBAL ASSESSMENT SCORE: 8
MDHAQ FUNCTION SCORE: .5

## 2017-03-28 NOTE — PROGRESS NOTES
03/28/17 1410   OTHER   MDHAQ Score 0.5   Psychologic Score 3.3   Pain Score 9   Morning Stiffness Score Yes   Number of minutes or hours until limber several hours   Fatigue Score 10   Global Health Score 8   RAPID3 Score 6.22

## 2017-03-28 NOTE — PROGRESS NOTES
Subjective:       Patient ID: Franca Coffey is a 67 y.o. female.    Chief Complaint: Follow-up (1 month per Elissa)    HPI Comments: 67 year old female in for follow up of hypertension, diabetes, hyperlipidemia and other problems.  She recently was placed on lipitor 40mg by Dr. Mcdaniels for elevated LDL, target <70.  Her blood sugar control is good with an a1c of 5.8 and her thyroid is good with a TSH of 1.1.  She is having pain in the left knee, s/p total knee arthroplasty with a failed RFA ablation.  She is having problems with foot pain and flat feet and did have diabetic shoe inserts about 17 years ago made by Dr. Akers.  She is conflicted about getting inserts without first resolving the knee pain issue and feels she needs to handle that first.  She has a blood pressure log with excellent morning pressures but her evenings before bed tend to run high.  She is only on toprol xl 25 at this time and takes it at bedtime at the same time she checks the pressure.  She is not currently on an ACE or ARB but is willing to consider it.  She has been on a weight loss program with slow steady progress and is down 38 lb since I last saw her 3/8/16.    Past Medical History:  No date: Anxiety  No date: Cataract  No date: Depression  No date: Diabetes mellitus      Comment: oral meds  No date: Dizzy  No date: Fibromyalgia  No date: GERD (gastroesophageal reflux disease)  No date: History of bladder infections  No date: Hypertension  No date: Hypothyroid  No date: IBS (irritable bowel syndrome)  No date: Kidney stones  No date: Meniere disease  No date: Migraine  No date: Muscle spasms of head and/or neck      Comment: and lower ext  No date: Osteoarthritis  No date: PONV (postoperative nausea and vomiting)      Comment: severe-cause by morphine per pt  No date: Sleep apnea      Comment: CPAP    Past Surgical History:  No date: BLADDER SUSPENSION  8/27/14: COLONOSCOPY      Comment: Dr. Thomas, 5 year recheck  No date:  dental implant      Comment: upper RT implant  No date: EYE SURGERY      Comment: bilateral PHACO with IOL  12/05/2008: FOOT SURGERY Bilateral      Comment: bunionectomy  No date: HYSTERECTOMY  No date: JOINT REPLACEMENT Left      Comment: Partial knee  No date: JOINT REPLACEMENT Left      Comment: Total knee replacement  No date: KNEE ARTHROSCOPY Bilateral  No date: MANDIBLE FRACTURE SURGERY  No date: MULTIPLE TOOTH EXTRACTIONS  No date: revision of mesh      Comment: repair to bladder suspension  No date: TONSILLECTOMY, ADENOIDECTOMY  No date: UPPER GASTROINTESTINAL ENDOSCOPY  No date: VAGINAL DELIVERY      Comment: times 2      Current Outpatient Prescriptions:     atorvastatin (LIPITOR) 40 MG tablet, Take 1 tablet (40 mg total) by mouth once daily., Disp: 30 tablet, Rfl: 11    BD ALCOHOL SWABS PadM, , Disp: , Rfl:     chlordiazepoxide-clidinium 5-2.5 mg (LIBRAX) 5-2.5 mg Cap, Take by mouth. 2 qam, 1 q noon, 1 qhs, Disp: , Rfl: 0    duloxetine (CYMBALTA) 60 MG capsule, TAKE ONE CAPSULE BY MOUTH TWO TIMES DAILY, Disp: 60 capsule, Rfl: 0    esomeprazole (NEXIUM) 40 MG capsule, TAKE 1 CAPSULE ONE TIME DAILY, Disp: 90 capsule, Rfl: 0    furosemide (LASIX) 20 MG tablet, Take 1 tablet (20 mg total) by mouth once daily., Disp: 30 tablet, Rfl: 11    gabapentin (NEURONTIN) 100 MG capsule, Take 1 capsule (100 mg total) by mouth 3 (three) times daily., Disp: 90 capsule, Rfl: 2    gabapentin (NEURONTIN) 300 MG capsule, Take 1 capsule (300 mg total) by mouth 2 (two) times daily., Disp: 180 capsule, Rfl: 1    levothyroxine (SYNTHROID) 75 MCG tablet, take 1 tablet by mouth BEFORE BREAKFAST, Disp: 90 tablet, Rfl: 1    liraglutide 0.6 mg/0.1 mL, 18 mg/3 mL, subq PNIJ (VICTOZA 2-KENJI) 0.6 mg/0.1 mL (18 mg/3 mL) PnIj, Inject 1.2 mg into the skin once daily. 0.6 mg after 2 weeks advance to 1.2 mg, Disp: 6 mL, Rfl: 11    metformin (GLUCOPHAGE) 500 MG tablet, Take 1 tablet (500 mg total) by mouth 2 (two) times daily with  meals., Disp: 180 tablet, Rfl: 3    metoprolol succinate (TOPROL-XL) 25 MG 24 hr tablet, Take 1 tablet (25 mg total) by mouth once daily., Disp: 30 tablet, Rfl: 5    naproxen (EC NAPROSYN) 500 MG EC tablet, Take 1 tablet (500 mg total) by mouth 2 (two) times daily., Disp: 60 tablet, Rfl: 2    topiramate (TOPAMAX) 50 MG tablet, Take 1 tablet (50 mg total) by mouth once daily., Disp: 90 tablet, Rfl: 3    TRUE METRIX AIR GLUCOSE METER kit, , Disp: , Rfl:     TRUE METRIX GLUCOSE TEST STRIP Strp, , Disp: , Rfl:     TRUE METRIX LEVEL 1 Soln, , Disp: , Rfl:     TRUEPLUS LANCETS 28 gauge Misc, , Disp: , Rfl:     Foot Exam due on 01/29/1958  Zoster Vaccine due on 01/29/2008-DECLINES              Review of Systems   Constitutional: Negative for activity change, appetite change and unexpected weight change.   Eyes: Positive for visual disturbance (due to dry eyes, seeing Dr. Velásquez).   Respiratory: Negative for chest tightness and shortness of breath.    Cardiovascular: Negative for chest pain and palpitations.   Gastrointestinal: Negative for constipation, diarrhea, nausea and vomiting.   Endocrine: Negative for cold intolerance, heat intolerance, polydipsia, polyphagia and polyuria.   Genitourinary: Negative for dysuria, frequency and hematuria.   Musculoskeletal: Positive for arthralgias, gait problem, joint swelling and myalgias.       Objective:      Physical Exam   Constitutional: She is oriented to person, place, and time. She appears well-developed. No distress.   Good blood pressure control  Patient is obese with bmi of 32.0.   Weight is decreased by 38.1lb since last visit of 3/8/16.     HENT:   Head: Normocephalic and atraumatic.   Eyes: EOM are normal. Pupils are equal, round, and reactive to light. No scleral icterus.   Neck: Normal range of motion. Neck supple.   Cardiovascular: Normal rate, regular rhythm and normal heart sounds.    Pulses:       Dorsalis pedis pulses are 2+ on the right side, and 2+ on  the left side.        Posterior tibial pulses are 2+ on the right side, and 2+ on the left side.   Pulmonary/Chest: Effort normal and breath sounds normal.   Musculoskeletal:        Left knee: She exhibits decreased range of motion and swelling. Tenderness found. Lateral joint line tenderness noted.        Right foot: There is normal range of motion and no deformity (flat feet).        Left foot: There is normal range of motion and no deformity (flat feet).   Feet:   Right Foot:   Protective Sensation: 10 sites tested. 10 sites sensed.   Skin Integrity: Negative for ulcer, blister, skin breakdown, erythema, warmth, callus or dry skin.   Left Foot:   Protective Sensation: 10 sites tested. 10 sites sensed.   Skin Integrity: Negative for ulcer, blister, skin breakdown, erythema, warmth, callus or dry skin.   Neurological: She is alert and oriented to person, place, and time.   Skin: Skin is warm and dry. No rash noted. She is not diaphoretic. No erythema. No pallor.   Psychiatric: She has a normal mood and affect. Her behavior is normal. Judgment and thought content normal.   Nursing note and vitals reviewed.      Assessment:       1. Good hypertension control    2. Controlled type 2 diabetes mellitus without complication, without long-term current use of insulin    3. Essential hypertension    4. Hyperlipidemia, unspecified hyperlipidemia type    5. Hypertension associated with diabetes    6. Hyperlipidemia associated with type 2 diabetes mellitus    7. Pain due to total left knee replacement, subsequent encounter    8. Hypothyroidism, unspecified type    9. Left foot pain    10. BMI 32.0-32.9,adult        Plan:       1. Good hypertension control  Add lisinopril 2.5mg in am to try to improve evening control, continue toprol xl 25 hs and lasix 20mg am    2. Controlled type 2 diabetes mellitus without complication, without long-term current use of insulin  Lab Results   Component Value Date    HGBA1C 5.8 03/07/2017          3. Essential hypertension  - lisinopril (PRINIVIL,ZESTRIL) 2.5 MG tablet; Take 1 tablet (2.5 mg total) by mouth once daily.  Dispense: 90 tablet; Refill: 3    4. Hyperlipidemia, unspecified hyperlipidemia type  Lab Results   Component Value Date    CHOL 179 03/07/2017    CHOL 228 (H) 10/24/2016    CHOL 197 06/07/2016     Lab Results   Component Value Date    HDL 51 03/07/2017    HDL 48 10/24/2016    HDL 59 06/07/2016     Lab Results   Component Value Date    LDLCALC 106.8 03/07/2017    LDLCALC 149.6 10/24/2016    LDLCALC 120.6 06/07/2016     Lab Results   Component Value Date    TRIG 106 03/07/2017    TRIG 152 (H) 10/24/2016    TRIG 87 06/07/2016     Lab Results   Component Value Date    CHOLHDL 28.5 03/07/2017    CHOLHDL 21.1 10/24/2016    CHOLHDL 29.9 06/07/2016     Added lipitor 40mg daily subsequent to last lab    5. Hypertension associated with diabetes    6. Hyperlipidemia associated with type 2 diabetes mellitus    7. Pain due to total left knee replacement, subsequent encounter  Debating seeing orthopedics again, can call for referral    8. Hypothyroidism, unspecified type  Lab Results   Component Value Date    TSH 1.167 03/07/2017     Followed by Dr. Agrawal    9. Left foot pain  Offered referral podiatry, will consider    10. BMI 32.0-32.9,adult  Continue weight loss program         Patient readiness: acceptance and barriers:none    During the course of the visit the patient was educated and counseled about the following:     Diabetes:  Discussed general issues about diabetes pathophysiology and management.  Discussed foot care.  Reminded to get yearly retinal exam.  Hypertension:   Medication: add lisinopril 2.5mg daily.  Dietary sodium restriction.  Regular aerobic exercise.  Obesity:   General weight loss/lifestyle modification strategies discussed (elicit support from others; identify saboteurs; non-food rewards, etc).  Informal exercise measures discussed, e.g. taking stairs instead of  elevator.  Regular aerobic exercise program discussed.    Goals: Diabetes: Maintain Hemoglobin A1C below 7, Hypertension: Reduce Blood Pressure and Obesity: Reduce calorie intake and BMI    Did patient meet goals/outcomes: Yes    The following self management tools provided: blood pressure log  blood glucose log  excercise log    Patient Instructions (the written plan) was given to the patient/family.     Time spent with patient: 45 minutes

## 2017-03-28 NOTE — MR AVS SNAPSHOT
[facility]  [address]  [place] [address]  Phone: [phone]  Fax: [phone]                  Franca Coffey   3/28/2017 2:00 PM   Initial consult    Description:  Female : 1950   Provider:  Sherley Velásquez MD   Department:  [facility]           Reason for Visit     Consult           Diagnoses this Visit        Comments    Polyarthralgia    -  Primary            To Do List           Future Appointments        Provider Department Dept Phone    3/28/2017 3:30 PM LAB, [facility] [phone]    3/28/2017 4:20 PM Naoh Chowdhury MD [facility] [phone]    2017 9:30 AM LAB, [facility] [address] [facility] [phone]    2017 1:00 PM Koby Mcdaniels MD [place] [facility] Card[facility] [phone]    2017 2:00 PM Bronson Agrawal MD [place] [facility]Di[facility] [phone]      Goals (5 Years of Data)     None       These Medications        Disp Refills Start End    gabapentin (NEURONTIN) 100 MG capsule 90 capsule 2 3/28/2017 3/28/2018    Take 1 capsule (100 mg total) by mouth 3 (three) times daily. - Oral    Pharmacy: [address] [facility] Ph #: [phone]       naproxen (EC NAPROSYN) 500 MG EC tablet 60 tablet 2 3/28/2017     Take 1 tablet (500 mg total) by mouth 2 (two) times daily. - Oral    Pharmacy: [address] [facility] Ph #: [phone]         [facility]     [facility] -  Assistance  Registered nurses in the [facility] provide clinical advisement, health education, appointment booking, and other advisory services.  Call for this free service at [phone].             Medications           Message regarding Medications     Verify the changes and/or additions to your medication regime listed below are the same as discussed with your  clinician today.  If any of these changes or additions are incorrect, please notify your healthcare provider.        START taking these NEW medications        Refills    gabapentin (NEURONTIN) 100 MG capsule 2    Sig: Take 1 capsule (100 mg total) by mouth 3 (three) times daily.    Class: Normal    Route: Oral    naproxen (EC NAPROSYN) 500 MG EC tablet 2    Sig: Take 1 tablet (500 mg total) by mouth 2 (two) times daily.    Class: Normal    Route: Oral           Verify that the below list of medications is an accurate representation of the medications you are currently taking.  If none reported, the list may be blank. If incorrect, please contact your healthcare provider. Carry this list with you in case of emergency.           Current Medications     atorvastatin (LIPITOR) 40 MG tablet Take 1 tablet (40 mg total) by mouth once daily.    BD ALCOHOL SWABS PadM     chlordiazepoxide-clidinium 5-2.5 mg (LIBRAX) 5-2.5 mg Cap     duloxetine (CYMBALTA) 60 MG capsule TAKE ONE CAPSULE BY MOUTH TWO TIMES DAILY    esomeprazole (NEXIUM) 40 MG capsule TAKE 1 CAPSULE ONE TIME DAILY    furosemide (LASIX) 20 MG tablet Take 1 tablet (20 mg total) by mouth once daily.    gabapentin (NEURONTIN) 300 MG capsule Take 1 capsule (300 mg total) by mouth 2 (two) times daily.    levothyroxine (SYNTHROID) 75 MCG tablet take 1 tablet by mouth BEFORE BREAKFAST    liraglutide 0.6 mg/0.1 mL, 18 mg/3 mL, subq PNIJ (VICTOZA 2-KENJI) 0.6 mg/0.1 mL (18 mg/3 mL) PnIj Inject 1.2 mg into the skin once daily. 0.6 mg after 2 weeks advance to 1.2 mg    metformin (GLUCOPHAGE) 500 MG tablet Take 1 tablet (500 mg total) by mouth 2 (two) times daily with meals.    metoprolol succinate (TOPROL-XL) 25 MG 24 hr tablet Take 1 tablet (25 mg total) by mouth once daily.    topiramate (TOPAMAX) 50 MG tablet Take 1 tablet (50 mg total) by mouth once daily.    TRUE METRIX AIR GLUCOSE METER kit     TRUE METRIX GLUCOSE TEST STRIP Strp     TRUE METRIX LEVEL 1 Soln     TRUEPLUS  "LANCETS 28 gauge Misc     gabapentin (NEURONTIN) 100 MG capsule Take 1 capsule (100 mg total) by mouth 3 (three) times daily.    naproxen (EC NAPROSYN) 500 MG EC tablet Take 1 tablet (500 mg total) by mouth 2 (two) times daily.           Clinical Reference Information           Your Vitals Were     BP Pulse Height Weight BMI    136/82 77 5' 2" (1.575 m) 79.3 kg (174 lb 13.2 oz) 31.98 kg/m2      Blood Pressure          Most Recent Value    BP  136/82      Allergies as of 3/28/2017     Sulfa (Sulfonamide Antibiotics)    Penicillins    Adhesive    Garlic    Morphine      Immunizations Administered on Date of Encounter - 3/28/2017     None      Orders Placed During Today's Visit     Future Labs/Procedures Expected by Expires    Aldolase  3/28/2017 5/27/2018    CK  3/28/2017 5/27/2018    Sjogrens syndrome-A extractable nuclear antibody  3/28/2017 5/27/2018      Language Assistance Services     ATTENTION: Language assistance services are available, free of charge. Please call [phone].      ATENCIÓN: Si habla cristinoañol, tiene a iglesias disposición servicios gratuitos de asistencia lingüística. Llame al [phone].     GREGORY Ý: N?u b?n nói Ti?ng Vi?t, có các d?ch v? h? tr? ngôn ng? mi?n phí dành cho b?n. G?i s? [phone].         [facility] complies with applicable Federal civil rights laws and does not discriminate on the basis of race, color, national origin, age, disability, or sex.        "

## 2017-03-28 NOTE — PROGRESS NOTES
Subjective:       Patient ID: Franca Coffey is a 67 y.o. female.    Chief Complaint: Polyarthralgia  HPI 67-year-old female with multiple comorbidities including diabetes, Hashimoto's thyroiditis, this metabolic syndrome, REYES, hypertension is seen in consultation for joint pain.  She complains of Polyarthralgia and myalgia for the last 10 years. Pain is aching type, worse in the evening, worse with activity, improves with over the counter NSAIDs. Early morning stiffness lasts for 2 hrs.   Currently, on cymbalta 60mg bid, gabapentin 300mg po qhs.   +Dry eyes +dry mouth   She denies fever, weight loss, photosensitivity, rash, ulcer, raynaud's phenomenon, alopecia, dysphagia, diarrhea or blood in the stools+                                  Widespread pain index 14  Note the areas which the patient has had pain over the last week:                   Shoulder-girdle, left               Shoulder-girdle, right                         Upper arm left                       Upper arm right                         Lower arm left                       Lower arm right    Hip (buttock, trochanter) left  Hip (buttock, trochanter) right                           Upper leg, left                         Upper leg, right                           Lower leg, left                         Lower leg, right                                     Jaw, left                                   Jaw, right                                        Chest                                  Abdomen                               Upper back                              Lower back                                        Neck  Score will be from 0-19:                                         Symptom severity score 10  Fatigue   Waking Unrefreshed  Cognitive Symptoms   0 = no problem, 1=slight or mild problem 2= moderate; considerable problems often present and/or at a moderate level, 3 = severe, pervasive, continuous, life disturbing problem  For each  "of the 3 symptoms, indicate the level of severity over the past week using the Scale.  The symptom severity score is the sum of the severity of the 3 symptoms (fatigue, waking unrefreshed, and cognitive symptoms) plus the number of the following symptoms occurring during the previous 6 months:   Headaches  Pain or cramps in the lower abdomen  Depression  The final score is between 0 and 12                                          Criteria  Patient has fibromyalgia if the following 3 conditions are met:  1.  Widespread pain index greater than or equal to 7 and symptom severity score greater than or equal to 5 or widespread pain index between 3- 6, and symptom severity score greater than or equal to 9.    2.  Symptoms have been present in a similar level for at least 3 months  3.  The patient does not have a disorder that would otherwise sufficiently explain the pain      Review of Systems   Constitutional: Positive for fatigue. Negative for fever.   HENT: Negative for ear discharge and ear pain.    Eyes: Negative for pain and redness.   Respiratory: Negative for cough and shortness of breath.    Cardiovascular: Negative for chest pain and palpitations.   Gastrointestinal: Negative for abdominal distention and abdominal pain.   Genitourinary: Negative for genital sores and hematuria.   Musculoskeletal: Positive for myalgias.   Neurological: Negative for tremors and seizures.   Psychiatric/Behavioral: Negative for agitation and hallucinations.         Objective:   /82  Pulse 77  Ht 5' 2" (1.575 m)  Wt 79.3 kg (174 lb 13.2 oz)  BMI 31.98 kg/m2     Physical Exam   Nursing note and vitals reviewed.  Constitutional: She is oriented to person, place, and time and well-developed, well-nourished, and in no distress.   HENT:   Head: Normocephalic and atraumatic.   Eyes: Conjunctivae and EOM are normal. Pupils are equal, round, and reactive to light.   Neck: Normal range of motion. Neck supple. No thyromegaly present. "   Cardiovascular: Normal rate and regular rhythm.  Exam reveals no friction rub.    Pulmonary/Chest: Effort normal and breath sounds normal.   Abdominal: Soft. Bowel sounds are normal. She exhibits no mass.   Neurological: She is alert and oriented to person, place, and time. She exhibits normal muscle tone.   Skin: Skin is warm and dry.     Psychiatric: Memory and affect normal.   Musculoskeletal: She exhibits no edema.        Lab Results   Component Value Date    WBC 6.18 01/26/2017    HGB 12.5 01/26/2017    HCT 37.9 01/26/2017    MCV 91 01/26/2017     01/26/2017     CMP  Sodium   Date Value Ref Range Status   01/26/2017 142 136 - 145 mmol/L Final     Potassium   Date Value Ref Range Status   01/26/2017 3.7 3.5 - 5.1 mmol/L Final     Chloride   Date Value Ref Range Status   01/26/2017 104 95 - 110 mmol/L Final     CO2   Date Value Ref Range Status   01/26/2017 28 23 - 29 mmol/L Final     Glucose   Date Value Ref Range Status   01/26/2017 92 70 - 110 mg/dL Final     BUN, Bld   Date Value Ref Range Status   01/26/2017 25 (H) 8 - 23 mg/dL Final     Creatinine   Date Value Ref Range Status   01/26/2017 0.9 0.5 - 1.4 mg/dL Final     Calcium   Date Value Ref Range Status   01/26/2017 9.4 8.7 - 10.5 mg/dL Final     Total Protein   Date Value Ref Range Status   01/26/2017 7.2 6.0 - 8.4 g/dL Final     Albumin   Date Value Ref Range Status   01/26/2017 4.0 3.5 - 5.2 g/dL Final     Total Bilirubin   Date Value Ref Range Status   01/26/2017 0.3 0.1 - 1.0 mg/dL Final     Comment:     For infants and newborns, interpretation of results should be based  on gestational age, weight and in agreement with clinical  observations.  Premature Infant recommended reference ranges:  Up to 24 hours.............<8.0 mg/dL  Up to 48 hours............<12.0 mg/dL  3-5 days..................<15.0 mg/dL  6-29 days.................<15.0 mg/dL       Alkaline Phosphatase   Date Value Ref Range Status   01/26/2017 59 55 - 135 U/L Final      AST   Date Value Ref Range Status   01/26/2017 16 10 - 40 U/L Final     ALT   Date Value Ref Range Status   01/26/2017 16 10 - 44 U/L Final     Anion Gap   Date Value Ref Range Status   01/26/2017 10 8 - 16 mmol/L Final     eGFR if    Date Value Ref Range Status   01/26/2017 >60.0 >60 mL/min/1.73 m^2 Final     eGFR if non    Date Value Ref Range Status   01/26/2017 >60.0 >60 mL/min/1.73 m^2 Final     Comment:     Calculation used to obtain the estimated glomerular filtration  rate (eGFR) is the CKD-EPI equation. Since race is unknown   in our information system, the eGFR values for   -American and Non--American patients are given   for each creatinine result.       Lab Results   Component Value Date    SEDRATE 21 (H) 04/02/2015     Lab Results   Component Value Date    CRP 2.5 04/02/2012      Results for GEOFFREY CAICEDO (MRN [id number]) as of 3/28/2017 14:11   Ref. Range 4/2/2012 08:34 8/12/2014 10:41   NOHEMY Latest Ref Range: Neg <1:160  Pos, Titer to follow (A)    NOHEMY HEP-2 Titer Latest Ref Range: Neg <1:160 titer Pos 1:320 (A)    NOHEMY Hep-2 Pattern Unknown Nucleolar (A)    Anti-SSA Antibody Latest Ref Range: <20 EU 0.70    Anti-SSA Interpretation Latest Ref Range: Negative  Negative    Anti-SSB Antibody Latest Ref Range: <20 EU 1.47    Anti-SSB Interpretation Latest Ref Range: Negative  Negative    ds DNA Ab Latest Ref Range: Negative Titer Negative    Anti Sm Antibody Latest Ref Range: <20 EU 1.15    Anti-Sm Interpretation Latest Ref Range: Negative  Negative    Anti Sm/RNP Antibody Latest Ref Range: <20 EU 2.87    Anti-Sm/RNP Interpretation Latest Ref Range: Negative  Negative    TTG IgA Latest Ref Range: <20 UNITS  14   IgA Latest Ref Range: 40 - 350 mg/dL  179   CCP Antibodies Latest Ref Range: <5.0 U/mL <1.0    Rheumatoid Factor Latest Ref Range: 0 - 15 IU/ml 21 (H)      Radiology: I personally reviewed imaging  X ray of right hand in Aug 2016- ?  periarticular osteopenia    DEXA 2015- Osteopenia -- there is a 7.6% risk of a major osteoporotic fracture and a 0.9% risk of hip fracture in the next 10 years (FRAX).    Assessment:   Sicca symptoms-check SSA  Fibromyalgia WPI 14 SSS 10  Generalized osteoarthritis  Myalgia/fatigue check CPK and aldolase  Long-term NSAID use-  Plan:   Check SSA, CPK, aldolase  Increase gabapentin to 100 mg in morning, better milligram at lunch and 400 mg at bedtime.  Discussed side effects of gabapentin, advised to hold for sedation  Patient educated about fibromyalgia, handout provided  Start naproxen 500 mg twice a day with meals  DC over the counter NSAIDs  Advised to use only one NSAID at time  The patient was advised that NSAID-type medications have potential side effects: gastrointestinal irritation including hemorrhage, adverse cardiovascular effects and renal injuries. Patient was asked to take the medication with food and to stop if she experiences any GI upset. Advised to call if any vomiting, abdominal pain or black/bloody stools. The patient expresses understanding of these issues and questions were answered.  Return to clinic in 3 months

## 2017-03-28 NOTE — PATIENT INSTRUCTIONS

## 2017-03-28 NOTE — LETTER
March 30, 2017      Lydia Taylor MD  [address]  [place] [address]           [place] [facility]  [address]  [place] [address]  Phone: [phone]  Fax: [phone]          Patient: Franca Coffey   MR Number: [id number]   YOB: 1950   Date of Visit: 3/28/2017       Dear Dr. Lydia Taylor:    Thank you for referring Franca Coffey to me for evaluation. Attached you will find relevant portions of my assessment and plan of care.    If you have questions, please do not hesitate to call me. I look forward to following Franca Coffey along with you.    Sincerely,    Sherley Velásquez MD    Enclosure  CC:  No Recipients    If you would like to receive this communication electronically, please contact [email or web address] or [phone] to request more information on [employer] access.    For providers and/or their staff who would like to refer a patient to [facility], please contact us through our one-stop-shop provider referral line, [facility], at [phone].    If you feel you have received this communication in error or would no longer like to receive these types of communications, please e-mail [email or web address]

## 2017-03-30 LAB
ANTI-SSA ANTIBODY: 0.68 EU
ANTI-SSA INTERPRETATION: NEGATIVE

## 2017-04-03 LAB — ALDOLASE SERPL-CCNC: 3.2 U/L

## 2017-04-06 ENCOUNTER — PATIENT MESSAGE (OUTPATIENT)
Dept: GASTROENTEROLOGY | Facility: CLINIC | Age: 67
End: 2017-04-06

## 2017-04-15 ENCOUNTER — PATIENT MESSAGE (OUTPATIENT)
Dept: FAMILY MEDICINE | Facility: CLINIC | Age: 67
End: 2017-04-15

## 2017-04-17 ENCOUNTER — DOCUMENTATION ONLY (OUTPATIENT)
Dept: FAMILY MEDICINE | Facility: CLINIC | Age: 67
End: 2017-04-17

## 2017-04-17 NOTE — PROGRESS NOTES
Pre-Visit Chart Review  For Appointment Scheduled on 4-26-17    Health Maintenance Due   Topic Date Due    Zoster Vaccine  01/29/2008

## 2017-04-26 ENCOUNTER — OFFICE VISIT (OUTPATIENT)
Dept: FAMILY MEDICINE | Facility: CLINIC | Age: 67
End: 2017-04-26
Payer: MEDICARE

## 2017-04-26 VITALS
HEIGHT: 62 IN | SYSTOLIC BLOOD PRESSURE: 136 MMHG | HEART RATE: 73 BPM | WEIGHT: 171.5 LBS | BODY MASS INDEX: 31.56 KG/M2 | TEMPERATURE: 98 F | DIASTOLIC BLOOD PRESSURE: 79 MMHG

## 2017-04-26 DIAGNOSIS — M79.7 FIBROMYALGIA: ICD-10-CM

## 2017-04-26 DIAGNOSIS — M54.2 NECK PAIN: Primary | ICD-10-CM

## 2017-04-26 DIAGNOSIS — I70.0 ATHEROSCLEROSIS OF ABDOMINAL AORTA: ICD-10-CM

## 2017-04-26 PROCEDURE — 99213 OFFICE O/P EST LOW 20 MIN: CPT | Mod: S$GLB,,, | Performed by: FAMILY MEDICINE

## 2017-04-26 PROCEDURE — 99999 PR PBB SHADOW E&M-EST. PATIENT-LVL III: CPT | Mod: PBBFAC,,, | Performed by: FAMILY MEDICINE

## 2017-04-26 PROCEDURE — 3075F SYST BP GE 130 - 139MM HG: CPT | Mod: S$GLB,,, | Performed by: FAMILY MEDICINE

## 2017-04-26 PROCEDURE — 3078F DIAST BP <80 MM HG: CPT | Mod: S$GLB,,, | Performed by: FAMILY MEDICINE

## 2017-04-26 PROCEDURE — 1125F AMNT PAIN NOTED PAIN PRSNT: CPT | Mod: S$GLB,,, | Performed by: FAMILY MEDICINE

## 2017-04-26 PROCEDURE — 1160F RVW MEDS BY RX/DR IN RCRD: CPT | Mod: S$GLB,,, | Performed by: FAMILY MEDICINE

## 2017-04-26 PROCEDURE — 1159F MED LIST DOCD IN RCRD: CPT | Mod: S$GLB,,, | Performed by: FAMILY MEDICINE

## 2017-04-26 NOTE — PATIENT INSTRUCTIONS
Weight Management: Getting Started  Healthy bodies come in all shapes and sizes. Not all bodies are made to be thin. For some people, a healthy weight is higher than the average weight listed on weight charts. Your healthcare provider can help you decide on a healthy weight for you.    Reasons to lose weight  Losing weight can help with some health problems, such as high blood pressure, heart disease, diabetes, sleep apnea, and arthritis. You may also feel more energy.  Set your long-term goal  Your goal doesn't even have to be a specific weight. You may decide on a fitness goal (such as being able to walk 10 miles a week), or a health goal (such as lowering your blood pressure). Choose a goal that is measurable and reasonable, so you know when you've reached it. A goal of reaching a BMI of less than 25 is not always reasonable (or possible).   Make an action plan  Habits dont change overnight. Setting your goals too high can leave you feeling discouraged if you cant reach them. Be realistic. Choose one or two small changes you can make now. Set an action plan for how you are going to make these changes. When you can stick to this plan, keep making a few more small changes. Taking small steps will help you stay on the path to success.  Track your progress  Write down your goals. Then, keep a daily record of your progress. Write down what you eat and how active you are. This record lets you look back on how much youve done. It may also help when youre feeling frustrated. Reward yourself for success. Even if you dont reach every goal, give yourself credit for what you do get done.  Get support  Encouragement from others can help make losing weight easier. Ask your family members and friends for support. They may even want to join you. Also look to your healthcare provider, registered dietitian, and  for help. Your local hospital can give you more information about nutrition, exercise, and  weight loss.  Date Last Reviewed: 1/31/2016 © 2000-2016 [employer]. [address] [place] [address], [place] [address]. All rights reserved. This information is not intended as a substitute for professional medical care. Always follow your healthcare professional's instructions.        Walking for Fitness  Fitness walking has something for everyone, even people who are already fit. Walking is one of the safest ways to condition your body aerobically. It can boost energy, help you lose weight, and reduce stress.    Physical benefits  · Walking strengthens your heart and lungs, and tones your muscles.  · When walking, your feet land with less impact than in other sports. This reduces chances of muscle, bone, and joint injury.  · Regular walking improves your cholesterol levels and lowers your risk of heart disease. And it helps you control your blood sugar if you have diabetes.  · Walking is a weight-bearing activity, which helps maintain bone density. This can help prevent osteoporosis.  Personal rewards  · Taking walks can help you relax and manage stress. And fitness walking may make you feel better about yourself.  · Walking can help you sleep better at night and make you less likely to be depressed.  · Regular walking may help maintain your memory as you get older.  · Walking is a great way to spend extra time with friends and family members. Be sure to invite your dog along!  Q&A about fitness walking  Q: Will walking keep me fit?  A: Yes. Regular walking at the right pace gives you all the benefits of other aerobic activities, such as jogging and swimming.  Q: Will walking help me lose weight and keep it off?  A: Yes. Per mile, walking can burn as many calories as jogging. Your health care provider can help work walking into your weight-loss plan.  Q: Is walking safe for my health?  A: Yes. Walking is safe if you have high blood pressure, diabetes, heart disease, or other conditions. Talk to your health  care provider before you start.  Date Last Reviewed: 5/9/2015  © 6409-8936 [employer]. [address] [place] [address], [place] [address]. All rights reserved. This information is not intended as a substitute for professional medical care. Always follow your healthcare professional's instructions.
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
## 2017-05-02 ENCOUNTER — HOSPITAL ENCOUNTER (OUTPATIENT)
Dept: RADIOLOGY | Facility: CLINIC | Age: 67
Discharge: HOME | End: 2017-05-02
Attending: FAMILY MEDICINE
Payer: MEDICARE

## 2017-05-02 ENCOUNTER — PATIENT MESSAGE (OUTPATIENT)
Dept: FAMILY MEDICINE | Facility: CLINIC | Age: 67
End: 2017-05-02

## 2017-05-02 DIAGNOSIS — I70.0 ATHEROSCLEROSIS OF ABDOMINAL AORTA: ICD-10-CM

## 2017-05-02 PROCEDURE — 76775 US EXAM ABDO BACK WALL LIM: CPT | Mod: 26,,, | Performed by: RADIOLOGY

## 2017-05-02 PROCEDURE — 76775 US EXAM ABDO BACK WALL LIM: CPT | Mod: TC,PO

## 2017-05-03 ENCOUNTER — PATIENT MESSAGE (OUTPATIENT)
Dept: GASTROENTEROLOGY | Facility: CLINIC | Age: 67
End: 2017-05-03

## 2017-05-03 DIAGNOSIS — R12 HEARTBURN: ICD-10-CM

## 2017-05-03 RX ORDER — ESOMEPRAZOLE MAGNESIUM 40 MG/1
CAPSULE, DELAYED RELEASE ORAL
Qty: 90 CAPSULE | Refills: 0 | Status: SHIPPED | OUTPATIENT
Start: 2017-05-03 | End: 2017-08-01 | Stop reason: SDUPTHER

## 2017-05-09 DIAGNOSIS — M54.50 LOW BACK PAIN: ICD-10-CM

## 2017-05-09 DIAGNOSIS — M54.31 SCIATICA OF RIGHT SIDE: ICD-10-CM

## 2017-05-09 RX ORDER — GABAPENTIN 300 MG/1
CAPSULE ORAL
Qty: 180 CAPSULE | Refills: 1 | Status: SHIPPED | OUTPATIENT
Start: 2017-05-09 | End: 2017-07-17 | Stop reason: SDUPTHER

## 2017-05-11 ENCOUNTER — OFFICE VISIT (OUTPATIENT)
Dept: FAMILY MEDICINE | Facility: CLINIC | Age: 67
End: 2017-05-11
Payer: MEDICARE

## 2017-05-11 ENCOUNTER — DOCUMENTATION ONLY (OUTPATIENT)
Dept: FAMILY MEDICINE | Facility: CLINIC | Age: 67
End: 2017-05-11

## 2017-05-11 VITALS
HEIGHT: 62 IN | DIASTOLIC BLOOD PRESSURE: 81 MMHG | TEMPERATURE: 98 F | SYSTOLIC BLOOD PRESSURE: 131 MMHG | HEART RATE: 70 BPM | WEIGHT: 172.63 LBS | BODY MASS INDEX: 31.77 KG/M2

## 2017-05-11 DIAGNOSIS — N23 RENAL COLIC ON LEFT SIDE: ICD-10-CM

## 2017-05-11 DIAGNOSIS — N30.01 ACUTE CYSTITIS WITH HEMATURIA: ICD-10-CM

## 2017-05-11 DIAGNOSIS — R30.0 DYSURIA: Primary | ICD-10-CM

## 2017-05-11 LAB
BILIRUB SERPL-MCNC: NEGATIVE MG/DL
BLOOD, POC UA: 250
GLUCOSE UR QL STRIP: NORMAL
KETONES UR QL STRIP: NEGATIVE
LEUKOCYTE ESTERASE URINE, POC: ABNORMAL
NITRITE, POC UA: NEGATIVE
PH, POC UA: 5
PROTEIN, POC: ABNORMAL
SPECIFIC GRAVITY, POC UA: 1.01
UROBILINOGEN, POC UA: NORMAL

## 2017-05-11 PROCEDURE — 3075F SYST BP GE 130 - 139MM HG: CPT | Mod: S$GLB,,, | Performed by: FAMILY MEDICINE

## 2017-05-11 PROCEDURE — 1160F RVW MEDS BY RX/DR IN RCRD: CPT | Mod: S$GLB,,, | Performed by: FAMILY MEDICINE

## 2017-05-11 PROCEDURE — 99999 PR PBB SHADOW E&M-EST. PATIENT-LVL V: CPT | Mod: PBBFAC,,, | Performed by: FAMILY MEDICINE

## 2017-05-11 PROCEDURE — 1125F AMNT PAIN NOTED PAIN PRSNT: CPT | Mod: S$GLB,,, | Performed by: FAMILY MEDICINE

## 2017-05-11 PROCEDURE — 3079F DIAST BP 80-89 MM HG: CPT | Mod: S$GLB,,, | Performed by: FAMILY MEDICINE

## 2017-05-11 PROCEDURE — 87086 URINE CULTURE/COLONY COUNT: CPT

## 2017-05-11 PROCEDURE — 1159F MED LIST DOCD IN RCRD: CPT | Mod: S$GLB,,, | Performed by: FAMILY MEDICINE

## 2017-05-11 PROCEDURE — 99214 OFFICE O/P EST MOD 30 MIN: CPT | Mod: 25,S$GLB,, | Performed by: FAMILY MEDICINE

## 2017-05-11 PROCEDURE — 81000 URINALYSIS NONAUTO W/SCOPE: CPT | Mod: S$GLB,,, | Performed by: FAMILY MEDICINE

## 2017-05-11 RX ORDER — HYDROCODONE BITARTRATE AND ACETAMINOPHEN 7.5; 325 MG/1; MG/1
1 TABLET ORAL EVERY 6 HOURS PRN
Qty: 20 TABLET | Refills: 0 | Status: SHIPPED | OUTPATIENT
Start: 2017-05-11 | End: 2018-05-24

## 2017-05-11 RX ORDER — TAMSULOSIN HYDROCHLORIDE 0.4 MG/1
0.4 CAPSULE ORAL DAILY
Qty: 30 CAPSULE | Refills: 0 | Status: SHIPPED | OUTPATIENT
Start: 2017-05-11 | End: 2018-05-24

## 2017-05-11 RX ORDER — DOXYCYCLINE 100 MG/1
100 CAPSULE ORAL 2 TIMES DAILY
Qty: 20 CAPSULE | Refills: 0 | Status: SHIPPED | OUTPATIENT
Start: 2017-05-11 | End: 2018-05-24

## 2017-05-11 NOTE — PATIENT INSTRUCTIONS
Controlling High Blood Pressure  High blood pressure (hypertension) is often called the silent killer. This is because many people who have it dont know it. High blood pressure is defined as 140/90 mm Hg or higher. Know your blood pressure and remember to check it regularly. Doing so can save your life. Here are some things you can do to help control your blood pressure.    Choose heart-healthy foods  · Select low-salt, low-fat foods. Limit sodium intake to 2,400 mg per day or the amount suggested by your healthcare provider.  · Limit canned, dried, cured, packaged, and fast foods. These can contain a lot of salt.  · Eat 8 to 10 servings of fruits and vegetables every day.  · Choose lean meats, fish, or chicken.  · Eat whole-grain pasta, brown rice, and beans.  · Eat 2 to 3 servings of low-fat or fat-free dairy products.  · Ask your doctor about the DASH eating plan. This plan helps reduce blood pressure.  · When you go to a restaurant, ask that your meal be prepared with no added salt.  Maintain a healthy weight  · Ask your healthcare provider how many calories to eat a day. Then stick to that number.  · Ask your healthcare provider what weight range is healthiest for you. If you are overweight, a weight loss of only 3% to 5% of your body weight can help lower blood pressure. Generally, a good weight loss goal is to lose 10% of your body weight in a year.  · Limit snacks and sweets.  · Get regular exercise.  Get up and get active  · Choose activities you enjoy. Find ones you can do with friends or family. This includes bicycling, dancing, walking, and jogging.  · Park farther away from building entrances.  · Use stairs instead of the elevator.  · When you can, walk or bike instead of driving.  · [place] leaves, garden, or do household repairs.  · Be active at a moderate to vigorous level of physical activity for at least 40 minutes for a minimum of 3 to 4 days a week.   Manage stress  · Make time to relax and enjoy  life. Find time to laugh.  · Communicate your concerns with your loved ones and your healthcare provider.  · Visit with family and friends, and keep up with hobbies.  Limit alcohol and quit smoking  · Men should have no more than 2 drinks per day.  · Women should have no more than 1 drink per day.  · Talk with your healthcare provider about quitting smoking. Smoking significantly increases your risk for heart disease and stroke. Ask your healthcare provider about community smoking cessation programs and other options.  Medicines  If lifestyle changes arent enough, your healthcare provider may prescribe high blood pressure medicine. Take all medicines as prescribed. If you have any questions about your medicines, ask your healthcare provider before stopping or changing them.   Date Last Reviewed: 4/27/2016 © 2000-2016 [employer]. [address] [place] [address], [place] [address]. All rights reserved. This information is not intended as a substitute for professional medical care. Always follow your healthcare professional's instructions.        Weight Management: Getting Started  Healthy bodies come in all shapes and sizes. Not all bodies are made to be thin. For some people, a healthy weight is higher than the average weight listed on weight charts. Your healthcare provider can help you decide on a healthy weight for you.    Reasons to lose weight  Losing weight can help with some health problems, such as high blood pressure, heart disease, diabetes, sleep apnea, and arthritis. You may also feel more energy.  Set your long-term goal  Your goal doesn't even have to be a specific weight. You may decide on a fitness goal (such as being able to walk 10 miles a week), or a health goal (such as lowering your blood pressure). Choose a goal that is measurable and reasonable, so you know when you've reached it. A goal of reaching a BMI of less than 25 is not always reasonable (or possible).   Make an action  plan  Habits dont change overnight. Setting your goals too high can leave you feeling discouraged if you cant reach them. Be realistic. Choose one or two small changes you can make now. Set an action plan for how you are going to make these changes. When you can stick to this plan, keep making a few more small changes. Taking small steps will help you stay on the path to success.  Track your progress  Write down your goals. Then, keep a daily record of your progress. Write down what you eat and how active you are. This record lets you look back on how much youve done. It may also help when youre feeling frustrated. Reward yourself for success. Even if you dont reach every goal, give yourself credit for what you do get done.  Get support  Encouragement from others can help make losing weight easier. Ask your family members and friends for support. They may even want to join you. Also look to your healthcare provider, registered dietitian, and  for help. Your local hospital can give you more information about nutrition, exercise, and weight loss.  Date Last Reviewed: 1/31/2016 © 2000-2016 [employer]. [address] [place] [address]. All rights reserved. This information is not intended as a substitute for professional medical care. Always follow your healthcare professional's instructions.        Walking for Fitness  Fitness walking has something for everyone, even people who are already fit. Walking is one of the safest ways to condition your body aerobically. It can boost energy, help you lose weight, and reduce stress.    Physical benefits  · Walking strengthens your heart and lungs, and tones your muscles.  · When walking, your feet land with less impact than in other sports. This reduces chances of muscle, bone, and joint injury.  · Regular walking improves your cholesterol levels and lowers your risk of heart disease. And it helps you control your blood sugar if  you have diabetes.  · Walking is a weight-bearing activity, which helps maintain bone density. This can help prevent osteoporosis.  Personal rewards  · Taking walks can help you relax and manage stress. And fitness walking may make you feel better about yourself.  · Walking can help you sleep better at night and make you less likely to be depressed.  · Regular walking may help maintain your memory as you get older.  · Walking is a great way to spend extra time with friends and family members. Be sure to invite your dog along!  Q&A about fitness walking  Q: Will walking keep me fit?  A: Yes. Regular walking at the right pace gives you all the benefits of other aerobic activities, such as jogging and swimming.  Q: Will walking help me lose weight and keep it off?  A: Yes. Per mile, walking can burn as many calories as jogging. Your health care provider can help work walking into your weight-loss plan.  Q: Is walking safe for my health?  A: Yes. Walking is safe if you have high blood pressure, diabetes, heart disease, or other conditions. Talk to your health care provider before you start.  Date Last Reviewed: 5/9/2015  © 2053-9675 [employer] [address] [place] [address]. All rights reserved. This information is not intended as a substitute for professional medical care. Always follow your healthcare professional's instructions.        Controlling High Blood Pressure  High blood pressure (hypertension) is often called the silent killer. This is because many people who have it dont know it. High blood pressure is defined as 140/90 mm Hg or higher. Know your blood pressure and remember to check it regularly. Doing so can save your life. Here are some things you can do to help control your blood pressure.    Choose heart-healthy foods  · Select low-salt, low-fat foods. Limit sodium intake to 2,400 mg per day or the amount suggested by your healthcare provider.  · Limit canned, dried, cured,  packaged, and fast foods. These can contain a lot of salt.  · Eat 8 to 10 servings of fruits and vegetables every day.  · Choose lean meats, fish, or chicken.  · Eat whole-grain pasta, brown rice, and beans.  · Eat 2 to 3 servings of low-fat or fat-free dairy products.  · Ask your doctor about the DASH eating plan. This plan helps reduce blood pressure.  · When you go to a restaurant, ask that your meal be prepared with no added salt.  Maintain a healthy weight  · Ask your healthcare provider how many calories to eat a day. Then stick to that number.  · Ask your healthcare provider what weight range is healthiest for you. If you are overweight, a weight loss of only 3% to 5% of your body weight can help lower blood pressure. Generally, a good weight loss goal is to lose 10% of your body weight in a year.  · Limit snacks and sweets.  · Get regular exercise.  Get up and get active  · Choose activities you enjoy. Find ones you can do with friends or family. This includes bicycling, dancing, walking, and jogging.  · Park farther away from building entrances.  · Use stairs instead of the elevator.  · When you can, walk or bike instead of driving.  · [place] leaves, garden, or do household repairs.  · Be active at a moderate to vigorous level of physical activity for at least 40 minutes for a minimum of 3 to 4 days a week.   Manage stress  · Make time to relax and enjoy life. Find time to laugh.  · Communicate your concerns with your loved ones and your healthcare provider.  · Visit with family and friends, and keep up with hobbies.  Limit alcohol and quit smoking  · Men should have no more than 2 drinks per day.  · Women should have no more than 1 drink per day.  · Talk with your healthcare provider about quitting smoking. Smoking significantly increases your risk for heart disease and stroke. Ask your healthcare provider about community smoking cessation programs and other options.  Medicines  If lifestyle changes arent  enough, your healthcare provider may prescribe high blood pressure medicine. Take all medicines as prescribed. If you have any questions about your medicines, ask your healthcare provider before stopping or changing them.   Date Last Reviewed: 4/27/2016 © 2000-2016 [employer]. [address] [place] [address], [place] [address]. All rights reserved. This information is not intended as a substitute for professional medical care. Always follow your healthcare professional's instructions.        Diabetes (General Information)  Diabetes is a long-term health problem. It means your body does not make enough insulin. Or it may mean that your body cannot use the insulin it makes. Insulin is a hormone in your body. It lets blood sugar (glucose) reach the cells in your body. All of your cells need glucose for fuel.  When you have diabetes, the glucose in your blood builds up because it cannot get into the cells. This buildup is called high blood sugar (hyperglycemia).  Your blood sugar level depends on several things. It depends on what kind of food you eat and how much of it you eat. It also depends on how much exercise you get, and how much insulin you have in your body. Eating too much of the wrong kinds of food or not taking diabetes medicine on time can cause high blood sugar. Infections can cause high blood sugar even if you are taking medicines correctly.  These things can also cause low blood sugar:  · Missing meals  · Not eating enough food  · Taking too much diabetes medicine  Diabetes can cause serious problems over time if you do not get treated. These problems include heart disease, stroke, kidney failure, and blindness. They also include nerve pain or loss of feeling in your legs and feet, and gangrene of the feet. By keeping your blood sugar under control you can prevent or delay these problems.  Normal blood sugar levels are 80 to 100 before a meal and less than 180 in the 1 to 2 hours after a  meal.  Home care  Follow these guidelines when caring for yourself at home:  · Follow the diet your healthcare provider gives you. Take insulin or other diabetes medicine exactly as told to.  · Watch your blood sugar as you are told to. Keep a log of your results. This will help your provider change your medicines to keep your blood sugar under control.  · Try to reach your ideal weight. You may be able to cut back on or not have to take diabetes medicine if you eat the right foods and get exercise.  · Do not smoke. Smoking worsens the effects of diabetes on your circulation. You are much more likely to have a heart attack if you have diabetes and you smoke.  · Take good care of your feet. If you have lost feeling in your feet, you may not see an injury or infection. Check your feet and between your toes at least once a week.  · Wear a medical alert bracelet or necklace, or carry a card in your wallet that says you have diabetes. This will help healthcare providers give you the right care if you get very ill and cannot tell them that you have diabetes.  Sick day plan  If you get a cold, the flu, or a bacterial or viral infection, take these steps:  · Look at your diabetes sick plan and call your healthcare provider as you were told to. You may need to call your provider right away if:  ¨ Your blood sugar is above 240 while taking your diabetes medicine  ¨ Your urine ketone levels are above normal or high  ¨ You have been vomiting more than 6 hours  ¨ You have trouble breathing or your breath ha s a fruity smell  ¨ You have a high fever  ¨ You have a fever for several days and you are not getting better  ¨ You get light-headed and are sleepier than usual  · Keep taking your diabetes pills (oral medicine) even if you have been vomiting and are feeling sick. Call your provider right away because you may need insulin to lower your blood sugar until you recover from your illness.  · Keep taking your insulin even if you  have been vomiting and are feeling sick. Call your provider right away to ask if you need to change your insulin dose. This will depend on your blood sugar results.  · Check your blood sugar every 2 to 4 hours, or at least 4 times a day.  · Check your ketones often. If you are vomiting and having diarrhea, watch them more often.  · Do not skip meals. Try to eat small meals on a regular schedule. Do this even if you do not feel like eating.  · Drink water or other liquids that do not have caffeine or calories. This will keep you from getting dehydrated. If you are nauseated or vomiting, takes small sips every 5 minutes. To prevent dehydration try to drink a cup (8 ounces) of fluids every hour while you are awake.  General care  Always bring a source of fast-acting sugar with you in case you have symptoms of low blood sugar (below 70). At the first sign of low blood sugar, eat or drink 15 to 20 grams of fast-acting sugar to raise your blood sugar. Examples are:  · 3 to 4 glucose tablets. You can buy these at most [employer].  · 4 ounces (1/2 cup) of regular (not diet) soft drinks  · 4 ounces (1/2 cup) of any fruit juice  · 8 ounces (1 cup) of milk  · 5 to 6 pieces of hard candy  · 1 tablespoon of honey  Check your blood sugar 15 minutes after treating yourself. If it is still below 70, take 15 to 20 more grams of fast-acting sugar. Test again in 15 minutes. If it returns to normal (70 or above), eat a snack or meal to keep your blood sugar in a safe range. If it stays low, call your doctor or go to an emergency room.  Follow-up care  Follow-up with your healthcare provider, or as advised. For more information about diabetes, visit the American Diabetes Association website at www.diabetes.org or call 989-467-8569.  When to seek medical advice  Call your healthcare provider right away if you have any of these symptoms of high blood sugar:  · Frequent urination  · Dizziness  · Drowsiness  · Thirst  · Headache  · Nausea  or vomiting  · Abdominal pain  · Eyesight changes  · Fast breathing  · Confusion or loss of consciousness  Also call your provider right away if you have any of these signs of low blood sugar:  · Fatigue  · Headache  · Shakes  · Excess sweating  · Hunger  · Feeling anxious or restless  · Eyesight changes  · Drowsiness  · Weakness  · Confusion or loss of consciousness  Call 911  Call for emergency help right away if any of these occur:  · Chest pain or shortness of breath  · Dizziness or fainting  · Weakness of an arm or leg or one side of the face  · Trouble speaking or seeing   Date Last Reviewed: 6/1/2016  © 0897-9547 [employer]. [address] [place] [address], [place] [address]. All rights reserved. This information is not intended as a substitute for professional medical care. Always follow your healthcare professional's instructions.

## 2017-05-11 NOTE — PROGRESS NOTES
Subjective:       Patient ID: Franca Coffey is a 67 y.o. female.    Chief Complaint: Urinary Tract Infection (burning and painful urination,)    HPI Comments: Also has some LLQ pain typical of past renal stones.    Urinary Tract Infection    This is a new problem. The current episode started in the past 7 days. The problem has been waxing and waning. There has been no fever. Associated symptoms include frequency. Pertinent negatives include no flank pain, nausea or rash. Her past medical history is significant for kidney stones.     Review of Systems   Constitutional: Negative for fever.   Respiratory: Negative for shortness of breath.    Cardiovascular: Negative for chest pain.   Gastrointestinal: Negative for abdominal pain and nausea.   Genitourinary: Positive for frequency. Negative for flank pain.   Skin: Negative for rash.   All other systems reviewed and are negative.      Objective:      Physical Exam   Constitutional: She appears well-developed. No distress.   Cardiovascular: Normal rate and regular rhythm.    No murmur heard.  Pulmonary/Chest: Effort normal and breath sounds normal.   Abdominal: Soft. There is tenderness in the suprapubic area. There is no CVA tenderness.       Assessment:       1. Dysuria    2. Acute cystitis with hematuria    3. Renal colic on left side        Plan:         Franca was seen today for urinary tract infection.    Diagnoses and all orders for this visit:    Dysuria  -     POCT Urinalysis  -     Urine culture  -     hydrocodone-acetaminophen 7.5-325mg (NORCO) 7.5-325 mg per tablet; Take 1 tablet by mouth every 6 (six) hours as needed for Pain.    Acute cystitis with hematuria  -     doxycycline (MONODOX) 100 MG capsule; Take 1 capsule (100 mg total) by mouth 2 (two) times daily.    Renal colic on left side  -     hydrocodone-acetaminophen 7.5-325mg (NORCO) 7.5-325 mg per tablet; Take 1 tablet by mouth every 6 (six) hours as needed for Pain.  -     tamsulosin  (FLOMAX) 0.4 mg Cp24; Take 1 capsule (0.4 mg total) by mouth once daily.

## 2017-05-11 NOTE — PROGRESS NOTES
Pre-Visit Chart Review  For Appointment Scheduled on 05/11/2017    Health Maintenance Due   Topic Date Due    Zoster Vaccine  01/29/2008

## 2017-05-12 LAB — BACTERIA UR CULT: NO GROWTH

## 2017-05-24 ENCOUNTER — TELEPHONE (OUTPATIENT)
Dept: CARDIOLOGY | Facility: CLINIC | Age: 67
End: 2017-05-24

## 2017-05-24 NOTE — TELEPHONE ENCOUNTER
Called and spoke to pt advising her that her appointment on 6/6 has been rescheduled to 8/1 per Dr Mcdaniels. Pt verbalized OK with new date time and has no issue with reschedule. Letter also mailed out.

## 2017-06-07 ENCOUNTER — HOSPITAL ENCOUNTER (OUTPATIENT)
Dept: RADIOLOGY | Facility: CLINIC | Age: 67
Discharge: HOME | End: 2017-06-07
Attending: INTERNAL MEDICINE
Payer: MEDICARE

## 2017-06-07 DIAGNOSIS — E06.3 HASHIMOTO'S THYROIDITIS: ICD-10-CM

## 2017-06-07 DIAGNOSIS — E03.9 HYPOTHYROIDISM, UNSPECIFIED TYPE: ICD-10-CM

## 2017-06-07 PROCEDURE — 76536 US EXAM OF HEAD AND NECK: CPT | Mod: TC,PO

## 2017-06-07 PROCEDURE — 76536 US EXAM OF HEAD AND NECK: CPT | Mod: 26,,, | Performed by: RADIOLOGY

## 2017-06-19 DIAGNOSIS — M25.50 POLYARTHRALGIA: ICD-10-CM

## 2017-06-19 RX ORDER — GABAPENTIN 100 MG/1
CAPSULE ORAL
Qty: 90 CAPSULE | Refills: 0 | Status: SHIPPED | OUTPATIENT
Start: 2017-06-19 | End: 2017-07-17 | Stop reason: SDUPTHER

## 2017-07-17 ENCOUNTER — OFFICE VISIT (OUTPATIENT)
Dept: RHEUMATOLOGY | Facility: CLINIC | Age: 67
End: 2017-07-17
Payer: MEDICARE

## 2017-07-17 VITALS
TEMPERATURE: 98 F | HEART RATE: 80 BPM | RESPIRATION RATE: 20 BRPM | HEIGHT: 62 IN | SYSTOLIC BLOOD PRESSURE: 141 MMHG | BODY MASS INDEX: 31 KG/M2 | WEIGHT: 168.44 LBS | DIASTOLIC BLOOD PRESSURE: 87 MMHG

## 2017-07-17 DIAGNOSIS — M15.9 PRIMARY OSTEOARTHRITIS INVOLVING MULTIPLE JOINTS: ICD-10-CM

## 2017-07-17 DIAGNOSIS — M54.31 SCIATICA OF RIGHT SIDE: ICD-10-CM

## 2017-07-17 DIAGNOSIS — M54.42 CHRONIC LEFT-SIDED LOW BACK PAIN WITH LEFT-SIDED SCIATICA: ICD-10-CM

## 2017-07-17 DIAGNOSIS — G89.29 CHRONIC LEFT-SIDED LOW BACK PAIN WITH LEFT-SIDED SCIATICA: ICD-10-CM

## 2017-07-17 DIAGNOSIS — M79.7 FIBROMYALGIA: Primary | ICD-10-CM

## 2017-07-17 PROCEDURE — 99999 PR PBB SHADOW E&M-EST. PATIENT-LVL III: CPT | Mod: PBBFAC,,, | Performed by: INTERNAL MEDICINE

## 2017-07-17 PROCEDURE — 99213 OFFICE O/P EST LOW 20 MIN: CPT | Mod: S$GLB,,, | Performed by: INTERNAL MEDICINE

## 2017-07-17 PROCEDURE — 1125F AMNT PAIN NOTED PAIN PRSNT: CPT | Mod: S$GLB,,, | Performed by: INTERNAL MEDICINE

## 2017-07-17 PROCEDURE — 1159F MED LIST DOCD IN RCRD: CPT | Mod: S$GLB,,, | Performed by: INTERNAL MEDICINE

## 2017-07-17 RX ORDER — NAPROXEN 500 MG/1
500 TABLET ORAL 2 TIMES DAILY
Qty: 60 TABLET | Refills: 2 | Status: SHIPPED | OUTPATIENT
Start: 2017-07-17 | End: 2018-05-24

## 2017-07-17 RX ORDER — GABAPENTIN 300 MG/1
300 CAPSULE ORAL 2 TIMES DAILY
Qty: 180 CAPSULE | Refills: 1 | Status: SHIPPED | OUTPATIENT
Start: 2017-07-17 | End: 2018-03-19 | Stop reason: SDUPTHER

## 2017-07-17 RX ORDER — GABAPENTIN 100 MG/1
100 CAPSULE ORAL DAILY
Qty: 90 CAPSULE | Refills: 1 | Status: SHIPPED | OUTPATIENT
Start: 2017-07-17 | End: 2018-01-09 | Stop reason: SDUPTHER

## 2017-07-17 ASSESSMENT — ROUTINE ASSESSMENT OF PATIENT INDEX DATA (RAPID3)
PATIENT GLOBAL ASSESSMENT SCORE: 5.5
PSYCHOLOGICAL DISTRESS SCORE: 8.8
AM STIFFNESS SCORE: 1, YES
TOTAL RAPID3 SCORE: 5
WHEN YOU AWAKENED IN THE MORNING OVER THE LAST WEEK, PLEASE INDICATE THE AMOUNT OF TIME IT TAKES UNTIL YOU ARE AS LIMBER AS YOU WILL BE FOR THE DAY: 2HRS
MDHAQ FUNCTION SCORE: .6
FATIGUE SCORE: 8
PAIN SCORE: 7.5

## 2017-07-17 NOTE — PROGRESS NOTES
"Subjective:       Patient ID: Franca Coffey is a 67 y.o. female.    Chief Complaint: Fibromyalgia  HPI 67-year-old female with multiple comorbidities including diabetes, Hashimoto's thyroiditis, this metabolic syndrome, REYES, hypertension is here for follow up for Fibromyalgia. Currently, on gabapentin and Cymbalta   Today, she grades her pain as 7.5 out of 10. Pain is aching type, worse in the evening, worse with activity, improves with  NSAIDs.  No joint swelling       Review of Systems   Constitutional: Negative for fatigue and fever.   HENT: Negative for ear discharge and ear pain.    Eyes: Negative for pain and redness.   Respiratory: Negative for cough and shortness of breath.    Cardiovascular: Negative for chest pain and palpitations.   Gastrointestinal: Negative for abdominal distention and abdominal pain.   Genitourinary: Negative for genital sores and hematuria.   Musculoskeletal: Positive for arthralgias. Negative for joint swelling and myalgias.         Objective:   BP (!) 141/87 (BP Location: Left arm, Patient Position: Sitting)   Pulse 80   Temp 98.3 °F (36.8 °C)   Resp 20   Ht 5' 2" (1.575 m)   Wt 76.4 kg (168 lb 6.9 oz)   BMI 30.81 kg/m²      Physical Exam   Nursing note and vitals reviewed.  Constitutional: She is oriented to person, place, and time and well-developed, well-nourished, and in no distress.   HENT:   Head: Normocephalic and atraumatic.   Eyes: Conjunctivae and EOM are normal. Pupils are equal, round, and reactive to light.   Neck: Normal range of motion. Neck supple. No thyromegaly present.   Cardiovascular: Normal rate and regular rhythm.  Exam reveals no friction rub.    Pulmonary/Chest: Effort normal and breath sounds normal.   Abdominal: Soft. Bowel sounds are normal.   Neurological: She is alert and oriented to person, place, and time.   Skin: Skin is warm and dry.     Psychiatric: Memory and affect normal.   Musculoskeletal: She exhibits no edema.   No joint " swelling         Lab Results   Component Value Date    WBC 6.18 01/26/2017    HGB 12.5 01/26/2017    HCT 37.9 01/26/2017    MCV 91 01/26/2017     01/26/2017     CMP  Sodium   Date Value Ref Range Status   01/26/2017 142 136 - 145 mmol/L Final     Potassium   Date Value Ref Range Status   01/26/2017 3.7 3.5 - 5.1 mmol/L Final     Chloride   Date Value Ref Range Status   01/26/2017 104 95 - 110 mmol/L Final     CO2   Date Value Ref Range Status   01/26/2017 28 23 - 29 mmol/L Final     Glucose   Date Value Ref Range Status   01/26/2017 92 70 - 110 mg/dL Final     BUN, Bld   Date Value Ref Range Status   01/26/2017 25 (H) 8 - 23 mg/dL Final     Creatinine   Date Value Ref Range Status   01/26/2017 0.9 0.5 - 1.4 mg/dL Final     Calcium   Date Value Ref Range Status   01/26/2017 9.4 8.7 - 10.5 mg/dL Final     Total Protein   Date Value Ref Range Status   01/26/2017 7.2 6.0 - 8.4 g/dL Final     Albumin   Date Value Ref Range Status   01/26/2017 4.0 3.5 - 5.2 g/dL Final     Total Bilirubin   Date Value Ref Range Status   01/26/2017 0.3 0.1 - 1.0 mg/dL Final     Comment:     For infants and newborns, interpretation of results should be based  on gestational age, weight and in agreement with clinical  observations.  Premature Infant recommended reference ranges:  Up to 24 hours.............<8.0 mg/dL  Up to 48 hours............<12.0 mg/dL  3-5 days..................<15.0 mg/dL  6-29 days.................<15.0 mg/dL       Alkaline Phosphatase   Date Value Ref Range Status   01/26/2017 59 55 - 135 U/L Final     AST   Date Value Ref Range Status   01/26/2017 16 10 - 40 U/L Final     ALT   Date Value Ref Range Status   01/26/2017 16 10 - 44 U/L Final     Anion Gap   Date Value Ref Range Status   01/26/2017 10 8 - 16 mmol/L Final     eGFR if    Date Value Ref Range Status   01/26/2017 >60.0 >60 mL/min/1.73 m^2 Final     eGFR if non    Date Value Ref Range Status   01/26/2017 >60.0 >60  mL/min/1.73 m^2 Final     Comment:     Calculation used to obtain the estimated glomerular filtration  rate (eGFR) is the CKD-EPI equation. Since race is unknown   in our information system, the eGFR values for   -American and Non--American patients are given   for each creatinine result.       Lab Results   Component Value Date    SEDRATE 21 (H) 04/02/2015     Lab Results   Component Value Date    CRP 2.5 04/02/2012      Results for GEOFFREY CAICEDO (MRN [id number]) as of 3/28/2017 14:11   Ref. Range 4/2/2012 08:34 8/12/2014 10:41   NOHEMY Latest Ref Range: Neg <1:160  Pos, Titer to follow (A)    NOHEMY HEP-2 Titer Latest Ref Range: Neg <1:160 titer Pos 1:320 (A)    NOHEMY Hep-2 Pattern Unknown Nucleolar (A)    Anti-SSA Antibody Latest Ref Range: <20 EU 0.70    Anti-SSA Interpretation Latest Ref Range: Negative  Negative    Anti-SSB Antibody Latest Ref Range: <20 EU 1.47    Anti-SSB Interpretation Latest Ref Range: Negative  Negative    ds DNA Ab Latest Ref Range: Negative Titer Negative    Anti Sm Antibody Latest Ref Range: <20 EU 1.15    Anti-Sm Interpretation Latest Ref Range: Negative  Negative    Anti Sm/RNP Antibody Latest Ref Range: <20 EU 2.87    Anti-Sm/RNP Interpretation Latest Ref Range: Negative  Negative    TTG IgA Latest Ref Range: <20 UNITS  14   IgA Latest Ref Range: 40 - 350 mg/dL  179   CCP Antibodies Latest Ref Range: <5.0 U/mL <1.0    Rheumatoid Factor Latest Ref Range: 0 - 15 IU/ml 21 (H)        X ray of right hand in Aug 2016- ? periarticular osteopenia    DEXA 2015- Osteopenia -- there is a 7.6% risk of a major osteoporotic fracture and a 0.9% risk of hip fracture in the next 10 years (FRAX).    Assessment:   Fibromyalgia   DDD with left-sided CRP  Generalized osteoarthritis  Long-term NSAID use  Plan:   Continue gabapentin to 100 mg in morning,  And 600 mg at bedtime.  Follow-up with Dr. Rosas for left-sided sciatica  naproxen 500 mg twice a day with meals  Return to clinic in 6  months

## 2017-07-18 PROBLEM — M15.0 PRIMARY OSTEOARTHRITIS INVOLVING MULTIPLE JOINTS: Status: ACTIVE | Noted: 2017-07-18

## 2017-07-18 PROBLEM — M15.9 PRIMARY OSTEOARTHRITIS INVOLVING MULTIPLE JOINTS: Status: ACTIVE | Noted: 2017-07-18

## 2017-08-01 DIAGNOSIS — M79.7 FIBROMYALGIA: ICD-10-CM

## 2017-08-01 DIAGNOSIS — R12 HEARTBURN: ICD-10-CM

## 2017-08-01 RX ORDER — DULOXETIN HYDROCHLORIDE 60 MG/1
CAPSULE, DELAYED RELEASE ORAL
Qty: 180 CAPSULE | Refills: 1 | Status: SHIPPED | OUTPATIENT
Start: 2017-08-01 | End: 2018-01-21 | Stop reason: SDUPTHER

## 2017-08-01 RX ORDER — ESOMEPRAZOLE MAGNESIUM 40 MG/1
CAPSULE, DELAYED RELEASE ORAL
Qty: 90 CAPSULE | Refills: 0 | Status: SHIPPED | OUTPATIENT
Start: 2017-08-01 | End: 2017-10-17 | Stop reason: SDUPTHER

## 2017-08-01 RX ORDER — DULOXETIN HYDROCHLORIDE 60 MG/1
CAPSULE, DELAYED RELEASE ORAL
Qty: 180 CAPSULE | Refills: 1 | OUTPATIENT
Start: 2017-08-01

## 2017-08-06 ENCOUNTER — PATIENT MESSAGE (OUTPATIENT)
Dept: OBSTETRICS AND GYNECOLOGY | Facility: CLINIC | Age: 67
End: 2017-08-06

## 2017-08-14 DIAGNOSIS — I10 ESSENTIAL HYPERTENSION: ICD-10-CM

## 2017-08-14 RX ORDER — LEVOTHYROXINE SODIUM 75 UG/1
TABLET ORAL
Qty: 90 TABLET | Refills: 2 | Status: SHIPPED | OUTPATIENT
Start: 2017-08-14 | End: 2018-03-09 | Stop reason: SDUPTHER

## 2017-08-14 RX ORDER — METOPROLOL SUCCINATE 25 MG/1
25 TABLET, EXTENDED RELEASE ORAL DAILY
Qty: 30 TABLET | Refills: 8 | Status: SHIPPED | OUTPATIENT
Start: 2017-08-14 | End: 2017-12-07 | Stop reason: SDUPTHER

## 2017-08-16 ENCOUNTER — DOCUMENTATION ONLY (OUTPATIENT)
Dept: FAMILY MEDICINE | Facility: CLINIC | Age: 67
End: 2017-08-16

## 2017-08-16 NOTE — PROGRESS NOTES
Pre-Visit Chart Review  For Appointment Scheduled on 8-23-17    Health Maintenance Due   Topic Date Due    Zoster Vaccine  01/29/2008    Influenza Vaccine  08/01/2017    Mammogram  08/19/2017

## 2017-08-24 ENCOUNTER — OFFICE VISIT (OUTPATIENT)
Dept: ORTHOPEDICS | Facility: CLINIC | Age: 67
End: 2017-08-24
Payer: MEDICARE

## 2017-08-24 ENCOUNTER — HOSPITAL ENCOUNTER (OUTPATIENT)
Dept: RADIOLOGY | Facility: HOSPITAL | Age: 67
Discharge: HOME | End: 2017-08-24
Attending: ORTHOPAEDIC SURGERY
Payer: MEDICARE

## 2017-08-24 VITALS
WEIGHT: 165 LBS | HEART RATE: 75 BPM | HEIGHT: 62 IN | DIASTOLIC BLOOD PRESSURE: 75 MMHG | SYSTOLIC BLOOD PRESSURE: 157 MMHG | BODY MASS INDEX: 30.36 KG/M2

## 2017-08-24 DIAGNOSIS — M25.561 PAIN IN BOTH KNEES, UNSPECIFIED CHRONICITY: ICD-10-CM

## 2017-08-24 DIAGNOSIS — M25.561 PAIN IN BOTH KNEES, UNSPECIFIED CHRONICITY: Primary | ICD-10-CM

## 2017-08-24 DIAGNOSIS — T84.84XD PAIN DUE TO TOTAL LEFT KNEE REPLACEMENT, SUBSEQUENT ENCOUNTER: Primary | ICD-10-CM

## 2017-08-24 DIAGNOSIS — M25.562 PAIN IN BOTH KNEES, UNSPECIFIED CHRONICITY: ICD-10-CM

## 2017-08-24 DIAGNOSIS — M15.9 PRIMARY OSTEOARTHRITIS INVOLVING MULTIPLE JOINTS: ICD-10-CM

## 2017-08-24 DIAGNOSIS — Z96.652 PAIN DUE TO TOTAL LEFT KNEE REPLACEMENT, SUBSEQUENT ENCOUNTER: Primary | ICD-10-CM

## 2017-08-24 DIAGNOSIS — M25.562 PAIN IN BOTH KNEES, UNSPECIFIED CHRONICITY: Primary | ICD-10-CM

## 2017-08-24 DIAGNOSIS — Z96.659 PAINFUL TOTAL KNEE REPLACEMENT, INITIAL ENCOUNTER: Primary | ICD-10-CM

## 2017-08-24 DIAGNOSIS — T84.84XA PAINFUL TOTAL KNEE REPLACEMENT, INITIAL ENCOUNTER: Primary | ICD-10-CM

## 2017-08-24 PROCEDURE — 3078F DIAST BP <80 MM HG: CPT | Mod: S$GLB,,, | Performed by: ORTHOPAEDIC SURGERY

## 2017-08-24 PROCEDURE — 3077F SYST BP >= 140 MM HG: CPT | Mod: S$GLB,,, | Performed by: ORTHOPAEDIC SURGERY

## 2017-08-24 PROCEDURE — 3008F BODY MASS INDEX DOCD: CPT | Mod: S$GLB,,, | Performed by: ORTHOPAEDIC SURGERY

## 2017-08-24 PROCEDURE — 73564 X-RAY EXAM KNEE 4 OR MORE: CPT | Mod: TC,50,PN

## 2017-08-24 PROCEDURE — 73564 X-RAY EXAM KNEE 4 OR MORE: CPT | Mod: 26,50,, | Performed by: RADIOLOGY

## 2017-08-24 PROCEDURE — 1125F AMNT PAIN NOTED PAIN PRSNT: CPT | Mod: S$GLB,,, | Performed by: ORTHOPAEDIC SURGERY

## 2017-08-24 PROCEDURE — 20610 DRAIN/INJ JOINT/BURSA W/O US: CPT | Mod: RT,S$GLB,, | Performed by: ORTHOPAEDIC SURGERY

## 2017-08-24 PROCEDURE — 99213 OFFICE O/P EST LOW 20 MIN: CPT | Mod: 25,S$GLB,, | Performed by: ORTHOPAEDIC SURGERY

## 2017-08-24 PROCEDURE — 99999 PR PBB SHADOW E&M-EST. PATIENT-LVL III: CPT | Mod: PBBFAC,,, | Performed by: ORTHOPAEDIC SURGERY

## 2017-08-24 PROCEDURE — 1159F MED LIST DOCD IN RCRD: CPT | Mod: S$GLB,,, | Performed by: ORTHOPAEDIC SURGERY

## 2017-08-24 RX ORDER — TRIAMCINOLONE ACETONIDE 40 MG/ML
40 INJECTION, SUSPENSION INTRA-ARTICULAR; INTRAMUSCULAR
Status: DISCONTINUED | OUTPATIENT
Start: 2017-08-24 | End: 2017-08-24 | Stop reason: HOSPADM

## 2017-08-24 RX ADMIN — TRIAMCINOLONE ACETONIDE 40 MG: 40 INJECTION, SUSPENSION INTRA-ARTICULAR; INTRAMUSCULAR at 10:08

## 2017-08-24 NOTE — PROGRESS NOTES
Past Medical History:   Diagnosis Date    Anxiety     Cataract     Depression     Diabetes mellitus     oral meds    Dizzy     Fibromyalgia     GERD (gastroesophageal reflux disease)     History of bladder infections     Hypertension     Hypothyroid     IBS (irritable bowel syndrome)     Kidney stones     Meniere disease     Migraine     Muscle spasms of head and/or neck     and lower ext    Osteoarthritis     PONV (postoperative nausea and vomiting)     severe-cause by morphine per pt    Sleep apnea     CPAP       Past Surgical History:   Procedure Laterality Date    BLADDER SUSPENSION      COLONOSCOPY  8/27/14    Dr. Thomas, 5 year recheck    dental implant      upper RT implant    EYE SURGERY      bilateral PHACO with IOL    FOOT SURGERY Bilateral 12/05/2008    bunionectomy    HYSTERECTOMY      JOINT REPLACEMENT Left     Partial knee    JOINT REPLACEMENT Left     Total knee replacement    KNEE ARTHROSCOPY Bilateral     MANDIBLE FRACTURE SURGERY      MULTIPLE TOOTH EXTRACTIONS      revision of mesh      repair to bladder suspension    TONSILLECTOMY, ADENOIDECTOMY      UPPER GASTROINTESTINAL ENDOSCOPY      VAGINAL DELIVERY      times 2       Current Outpatient Prescriptions   Medication Sig    atorvastatin (LIPITOR) 40 MG tablet Take 1 tablet (40 mg total) by mouth once daily.    BD ALCOHOL SWABS PadM     chlordiazepoxide-clidinium 5-2.5 mg (LIBRAX) 5-2.5 mg Cap Take by mouth. 2 qam, 1 q noon, 1 qhs    doxycycline (MONODOX) 100 MG capsule Take 1 capsule (100 mg total) by mouth 2 (two) times daily.    duloxetine (CYMBALTA) 60 MG capsule TAKE 1 CAPSULE TWICE DAILY    esomeprazole (NEXIUM) 40 MG capsule TAKE 1 CAPSULE EVERY DAY    furosemide (LASIX) 20 MG tablet Take 1 tablet (20 mg total) by mouth once daily.    gabapentin (NEURONTIN) 100 MG capsule Take 1 capsule (100 mg total) by mouth Daily.    gabapentin (NEURONTIN) 300 MG capsule Take 1 capsule (300 mg total) by mouth 2  (two) times daily.    hydrocodone-acetaminophen 7.5-325mg (NORCO) 7.5-325 mg per tablet Take 1 tablet by mouth every 6 (six) hours as needed for Pain.    levothyroxine (SYNTHROID) 75 MCG tablet TAKE 1 TABLET DAILY BEFORE BREAKFAST    liraglutide 0.6 mg/0.1 mL, 18 mg/3 mL, subq PNIJ (VICTOZA 2-KENJI) 0.6 mg/0.1 mL (18 mg/3 mL) PnIj Inject 1.2 mg into the skin once daily. 0.6 mg after 2 weeks advance to 1.2 mg    lisinopril (PRINIVIL,ZESTRIL) 2.5 MG tablet Take 1 tablet (2.5 mg total) by mouth once daily.    metformin (GLUCOPHAGE) 500 MG tablet Take 1 tablet (500 mg total) by mouth 2 (two) times daily with meals.    metoprolol succinate (TOPROL-XL) 25 MG 24 hr tablet Take 1 tablet (25 mg total) by mouth once daily.    naproxen (EC NAPROSYN) 500 MG EC tablet Take 1 tablet (500 mg total) by mouth 2 (two) times daily.    tamsulosin (FLOMAX) 0.4 mg Cp24 Take 1 capsule (0.4 mg total) by mouth once daily.    TRUE METRIX AIR GLUCOSE METER kit     TRUE METRIX GLUCOSE TEST STRIP Strp     TRUE METRIX LEVEL 1 Soln     TRUEPLUS LANCETS 28 gauge Misc     topiramate (TOPAMAX) 50 MG tablet Take 1 tablet (50 mg total) by mouth once daily.     No current facility-administered medications for this visit.        Allergies   Allergen Reactions    Sulfa (Sulfonamide Antibiotics) Hives, Itching and Rash    Penicillins      Other reaction(s): Unknown. Childhood reaction. Pt does not remember reaction.    Adhesive Itching and Rash     Paper tape OK    Garlic Diarrhea    Morphine Nausea And Vomiting       Family History   Problem Relation Age of Onset    Diabetes Mother     Heart disease Mother     Heart disease Father     Diabetes Father     Heart disease Brother     Cancer Brother     Early death Brother     Stomach cancer Brother     Breast cancer Maternal Aunt     Heart disease Brother     Ovarian cancer Neg Hx     Cataracts Neg Hx     Glaucoma Neg Hx     Macular degeneration Neg Hx     Retinal detachment  Neg Hx        Social History     Social History    Marital status:      Spouse name: N/A    Number of children: N/A    Years of education: N/A     Occupational History    Not on file.     Social History Main Topics    Smoking status: Former Smoker     Packs/day: 0.25     Years: 1.00     Quit date: 10/15/1966    Smokeless tobacco: Never Used    Alcohol use Yes      Comment: occasionally    Drug use: No    Sexual activity: Not Currently     Partners: Male     Birth control/ protection: Post-menopausal, Surgical     Other Topics Concern    Not on file     Social History Narrative    No narrative on file       Chief Complaint:   Chief Complaint   Patient presents with    Knee Pain     bilateral       History of present illness: This is a interval history-year-old female seen for a painful left total knee replacement.  The left total knee was replaced in December 23, 2013  This was done by Dr. Kumar.  Patient previously had had a left partial knee replacement.  Patient has always had pain.  Difficulty extending her knee.  Pain with walking, sitting, or bending.  No treatment since her surgery.  No history of infections.  Rates her pain as a 7/10.  Symptoms are stable and moderate.  Patient has not seen Dr. Kumar in several years.  She also complains of right knee pain.  Pain when bending or walking.  She had a history of a scope.  No recent treatment.  Pain below the kneecap and in the back of the knee.  Rates her pain in the right knee has a 7 out of 10 also.  Patient has a history of fibromyalgia.    History of present illness: She continues to complain of left knee pain.  Pain is globally around her knee.  Patient does have fibromyalgia and sees Dr. Velásquez.  I injected her right knee about a year ago with good success.  She is currently on Naprosyn.  Pain as a 6 out of 10.    Review of Systems:    Constitution: Negative for chills, fever, and sweats.  Negative for unexplained weight loss.    HENT:   Negative for headaches and blurry vision.    Cardiovascular:Negative for chest pain or irregular heart beat. Negative for hypertension.    Respiratory:  Negative for cough and shortness of breath.    Gastrointestinal: Negative for abdominal pain, heartburn, melena, nausea, and vomitting.    Genitourinary:  Negative bladder incontinence and dysuria.    Musculoskeletal:  Negative for joint pain.  Negative for arthritis, joint swelling, muscle weakness, and myalgias.      Neurological: Negative for numbness.    Psychiatric/Behavioral: Negative for depression.  The patient is not nervous/anxious.      Endocrine: Negative for polyuria    Hematologic/Lymphatic: Negative for bleeding problem.  Does not bruise/bleed easily.    Skin: Negative for poor would healing and rash      Physical Examination:    Vital Signs:    Vitals:    08/24/17 0935   BP: (!) 157/75   Pulse: 75       This a well-developed, well nourished patient in no acute distress.  They are alert and oriented and cooperative to examination.  Pt. walks without an antalgic gait.      Examination of the left knee shows no rashes or erythema. There are no masses ecchymosis or effusion. She has a well-healed midline surgical incision without erythema or drainage.Patient has full range of motion from 0-110°. Patient is Globally tender over her entire knee. Anterior posterior drawer seems stable. Knee is stable to varus and valgus stress. 5 out of 5 motor strength. Palpable distal pulses. Intact light touch sensation. Negative Patellofemoral crepitus    Examination of the right knee shows no rashes or erythema. There are no masses ecchymosis or effusion. Patient has full range of motion from 0-110°. Patient is nontender to palpation over lateral joint line and nontender to palpation over the medial joint line. Patient has a - Lachman exam, - anterior drawer exam, and - posterior drawer exam. - Heriberto's exam. Knee is stable to varus and valgus stress. 5 out of 5  motor strength. Palpable distal pulses. Intact light touch sensation. Negative Patellofemoral crepitus        X-rays: 4 views of bilateral knees are ordered and reviewed which show total knee arthroplasty components in good position without signs of fracture or loosening.  Moderate arthritic changes on the right knee.  No significant progression from x-rays a couple years ago.     Assessment::Painful left total knee  Right knee arthritis    Plan:  I reviewed the findings with her today.  I recommended a cortisone injection for her right knee.  We'll get some CBC sedimentation rate and CRP to evaluate for possible underlying infection to start.

## 2017-08-24 NOTE — PROCEDURES
Large Joint Aspiration/Injection  Date/Time: 8/24/2017 10:27 AM  Performed by: ZACH SHARMA  Authorized by: ZACH SHARMA     Consent Done?:  Yes (Verbal)  Indications:  Pain  Procedure site marked: Yes    Timeout: Prior to procedure the correct patient, procedure, and site was verified      Location:  Knee  Site:  R knee  Prep: Patient was prepped and draped in usual sterile fashion    Needle size:  20 G  Approach:  Anterolateral  Medications:  40 mg triamcinolone acetonide 40 mg/mL  Patient tolerance:  Patient tolerated the procedure well with no immediate complications

## 2017-08-28 ENCOUNTER — OFFICE VISIT (OUTPATIENT)
Dept: OBSTETRICS AND GYNECOLOGY | Facility: CLINIC | Age: 67
End: 2017-08-28
Payer: MEDICARE

## 2017-08-28 VITALS
DIASTOLIC BLOOD PRESSURE: 80 MMHG | HEIGHT: 62 IN | SYSTOLIC BLOOD PRESSURE: 129 MMHG | BODY MASS INDEX: 30.76 KG/M2 | WEIGHT: 167.13 LBS | HEART RATE: 76 BPM

## 2017-08-28 DIAGNOSIS — N63.10 LUMP OF RIGHT BREAST: ICD-10-CM

## 2017-08-28 DIAGNOSIS — Z01.419 WELL WOMAN EXAM WITH ROUTINE GYNECOLOGICAL EXAM: Primary | ICD-10-CM

## 2017-08-28 PROCEDURE — 99999 PR PBB SHADOW E&M-EST. PATIENT-LVL III: CPT | Mod: PBBFAC,,, | Performed by: OBSTETRICS & GYNECOLOGY

## 2017-08-28 PROCEDURE — G0101 CA SCREEN;PELVIC/BREAST EXAM: HCPCS | Mod: S$GLB,,, | Performed by: OBSTETRICS & GYNECOLOGY

## 2017-08-28 RX ORDER — TOPIRAMATE 50 MG/1
TABLET, FILM COATED ORAL
COMMUNITY
Start: 2017-08-14 | End: 2017-12-02 | Stop reason: SDUPTHER

## 2017-08-30 ENCOUNTER — HOSPITAL ENCOUNTER (OUTPATIENT)
Dept: RADIOLOGY | Facility: HOSPITAL | Age: 67
Discharge: HOME | End: 2017-08-30
Attending: OBSTETRICS & GYNECOLOGY
Payer: MEDICARE

## 2017-08-30 VITALS — HEIGHT: 62 IN | BODY MASS INDEX: 30.73 KG/M2 | WEIGHT: 167 LBS

## 2017-08-30 DIAGNOSIS — N63.10 LUMP OF RIGHT BREAST: ICD-10-CM

## 2017-08-30 PROCEDURE — 76642 ULTRASOUND BREAST LIMITED: CPT | Mod: 26,RT,, | Performed by: RADIOLOGY

## 2017-08-30 PROCEDURE — 76642 ULTRASOUND BREAST LIMITED: CPT | Mod: TC,RT

## 2017-08-30 PROCEDURE — 77062 BREAST TOMOSYNTHESIS BI: CPT | Mod: 26,,, | Performed by: RADIOLOGY

## 2017-08-30 PROCEDURE — 77066 DX MAMMO INCL CAD BI: CPT | Mod: TC

## 2017-08-30 PROCEDURE — 77066 DX MAMMO INCL CAD BI: CPT | Mod: 26,,, | Performed by: RADIOLOGY

## 2017-09-06 NOTE — PROGRESS NOTES
SUBJECTIVE:   67 y.o. female   for annual routine Pap and checkup. No LMP recorded. Patient has had a hysterectomy..  She has no unusual complaints.        Past Medical History:   Diagnosis Date    Anxiety     Cataract     Depression     Diabetes mellitus     oral meds    Dizzy     Fibromyalgia     GERD (gastroesophageal reflux disease)     History of bladder infections     Hypertension     Hypothyroid     IBS (irritable bowel syndrome)     Kidney stones     Meniere disease     Migraine     Muscle spasms of head and/or neck     and lower ext    Osteoarthritis     PONV (postoperative nausea and vomiting)     severe-cause by morphine per pt    Sleep apnea     CPAP     Past Surgical History:   Procedure Laterality Date    BLADDER SUSPENSION      BREAST BIOPSY      COLONOSCOPY  14    Dr. Thomas, 5 year recheck    dental implant      upper RT implant    EYE SURGERY      bilateral PHACO with IOL    FOOT SURGERY Bilateral 2008    bunionectomy    HYSTERECTOMY      JOINT REPLACEMENT Left     Partial knee    JOINT REPLACEMENT Left     Total knee replacement    KNEE ARTHROSCOPY Bilateral     MANDIBLE FRACTURE SURGERY      MULTIPLE TOOTH EXTRACTIONS      revision of mesh      repair to bladder suspension    TONSILLECTOMY, ADENOIDECTOMY      UPPER GASTROINTESTINAL ENDOSCOPY      VAGINAL DELIVERY      times 2     Social History     Social History    Marital status:      Spouse name: N/A    Number of children: N/A    Years of education: N/A     Occupational History    Not on file.     Social History Main Topics    Smoking status: Former Smoker     Packs/day: 0.25     Years: 1.00     Quit date: 10/15/1966    Smokeless tobacco: Never Used    Alcohol use Yes      Comment: occasionally    Drug use: No    Sexual activity: Not Currently     Partners: Male     Birth control/ protection: Post-menopausal, Surgical     Other Topics Concern    Not on file     Social  History Narrative    No narrative on file     Family History   Problem Relation Age of Onset    Diabetes Mother     Heart disease Mother     Heart disease Father     Diabetes Father     Heart disease Brother     Cancer Brother     Early death Brother     Stomach cancer Brother     Breast cancer Maternal Aunt     Heart disease Brother     Ovarian cancer Neg Hx     Cataracts Neg Hx     Glaucoma Neg Hx     Macular degeneration Neg Hx     Retinal detachment Neg Hx      OB History    Para Term  AB Living   3 2 2   1 2   SAB TAB Ectopic Multiple Live Births   1              # Outcome Date GA Lbr Roderick/2nd Weight Sex Delivery Anes PTL Lv   3 SAB            2 Term            1 Term                     Current Outpatient Prescriptions   Medication Sig Dispense Refill    atorvastatin (LIPITOR) 40 MG tablet Take 1 tablet (40 mg total) by mouth once daily. 30 tablet 11    BD ALCOHOL SWABS PadM       chlordiazepoxide-clidinium 5-2.5 mg (LIBRAX) 5-2.5 mg Cap Take by mouth. 2 qam, 1 q noon, 1 qhs  0    doxycycline (MONODOX) 100 MG capsule Take 1 capsule (100 mg total) by mouth 2 (two) times daily. 20 capsule 0    duloxetine (CYMBALTA) 60 MG capsule TAKE 1 CAPSULE TWICE DAILY 180 capsule 1    esomeprazole (NEXIUM) 40 MG capsule TAKE 1 CAPSULE EVERY DAY 90 capsule 0    furosemide (LASIX) 20 MG tablet Take 1 tablet (20 mg total) by mouth once daily. 30 tablet 11    gabapentin (NEURONTIN) 100 MG capsule Take 1 capsule (100 mg total) by mouth Daily. 90 capsule 1    gabapentin (NEURONTIN) 300 MG capsule Take 1 capsule (300 mg total) by mouth 2 (two) times daily. 180 capsule 1    hydrocodone-acetaminophen 7.5-325mg (NORCO) 7.5-325 mg per tablet Take 1 tablet by mouth every 6 (six) hours as needed for Pain. 20 tablet 0    levothyroxine (SYNTHROID) 75 MCG tablet TAKE 1 TABLET DAILY BEFORE BREAKFAST 90 tablet 2    liraglutide 0.6 mg/0.1 mL, 18 mg/3 mL, subq PNIJ (VICTOZA 2-KENJI) 0.6 mg/0.1 mL (18 mg/3  mL) PnIj Inject 1.2 mg into the skin once daily. 0.6 mg after 2 weeks advance to 1.2 mg 6 mL 11    lisinopril (PRINIVIL,ZESTRIL) 2.5 MG tablet Take 1 tablet (2.5 mg total) by mouth once daily. 90 tablet 3    metformin (GLUCOPHAGE) 500 MG tablet Take 1 tablet (500 mg total) by mouth 2 (two) times daily with meals. 180 tablet 3    metoprolol succinate (TOPROL-XL) 25 MG 24 hr tablet Take 1 tablet (25 mg total) by mouth once daily. 30 tablet 8    naproxen (EC NAPROSYN) 500 MG EC tablet Take 1 tablet (500 mg total) by mouth 2 (two) times daily. 60 tablet 2    tamsulosin (FLOMAX) 0.4 mg Cp24 Take 1 capsule (0.4 mg total) by mouth once daily. 30 capsule 0    topiramate (TOPAMAX) 50 MG tablet       TRUE METRIX AIR GLUCOSE METER kit       TRUE METRIX GLUCOSE TEST STRIP Strp       TRUE METRIX LEVEL 1 Soln       TRUEPLUS LANCETS 28 gauge Misc        No current facility-administered medications for this visit.      Allergies: Sulfa (sulfonamide antibiotics); Penicillins; Adhesive; Garlic; and Morphine     ROS:  Constitutional: no weight loss, weight gain, fever, fatigue  Eyes:  No vision changes, glasses/contacts  ENT/Mouth: No ulcers, sinus problems, ears ringing, headache  Cardiovascular: No inability to lie flat, chest pain, exercise intolerance, swelling, heart palpitations  Respiratory: No wheezing, coughing blood, shortness of breath, or cough  Gastrointestinal: No diarrhea, bloody stool, nausea/vomiting, constipation, gas, hemorrhoids  Genitourinary: No blood in urine, painful urination, urgency of urination, frequency of urination, incomplete emptying, incontinence, abnormal bleeding, painful periods, heavy periods, vaginal discharge, vaginal odor, painful intercourse, sexual problems, bleeding after intercourse.  Musculoskeletal: No muscle weakness  Skin/Breast: No painful breasts, nipple discharge, masses, rash, ulcers  Neurological: No passing out, seizures, numbness, headache  Endocrine: No diabetes,  "hypothyroid, hyperthyroid, hot flashes, hair loss, abnormal hair growth, ance  Psychiatric: No depression, crying  Hematologic: No bruises, bleeding, swollen lymph nodes, anemia.      OBJECTIVE:   The patient appears well, alert, oriented x 3, in no distress.  /80   Pulse 76   Ht 5' 2" (1.575 m)   Wt 75.8 kg (167 lb 1.7 oz)   BMI 30.56 kg/m²   NECK: no thyromegaly, trachea midline  SKIN: no acne, striae, hirsutism  BREAST EXAM:  Right breast mass,  Left breast appears normal, no suspicious masses, no skin or nipple changes or axillary nodes  ABDOMEN: no hernias, masses, or hepatosplenomegaly  GENITALIA: normal external genitalia, no erythema, no discharge  URETHRA: normal urethra, normal urethral meatus  VAGINA: mucosal atrophy  CERVIX:  absent  UTERUS: uterus absent  ADNEXA: no mass, fullness, tenderness    ASSESSMENT and PLAN    Well woman exam with routine gynecological exam    Lump of right breast  -     Cancel: Mammo Digital Diagnostic Bilateral With CAD; Future; Expected date: 08/28/2017  -     Cancel: US Breast Right Complete; Future; Expected date: 08/28/2017  -     Mammo Digital Diagnostic Bilat with Tomosynthesis_CAD; Future; Expected date: 08/29/2017      "

## 2017-09-28 DIAGNOSIS — E11.9 TYPE 2 DIABETES MELLITUS WITHOUT COMPLICATION: ICD-10-CM

## 2017-10-17 DIAGNOSIS — R12 HEARTBURN: ICD-10-CM

## 2017-10-17 RX ORDER — ESOMEPRAZOLE MAGNESIUM 40 MG/1
CAPSULE, DELAYED RELEASE ORAL
Qty: 90 CAPSULE | Refills: 1 | Status: SHIPPED | OUTPATIENT
Start: 2017-10-17 | End: 2018-03-09 | Stop reason: SDUPTHER

## 2017-11-05 ENCOUNTER — PATIENT MESSAGE (OUTPATIENT)
Dept: ENDOCRINOLOGY | Facility: CLINIC | Age: 67
End: 2017-11-05

## 2017-12-02 RX ORDER — TOPIRAMATE 50 MG/1
TABLET, FILM COATED ORAL
Qty: 90 TABLET | Refills: 3 | Status: SHIPPED | OUTPATIENT
Start: 2017-12-02 | End: 2018-11-19 | Stop reason: SDUPTHER

## 2017-12-02 RX ORDER — ISOPROPYL ALCOHOL 0.75 G/1
SWAB TOPICAL
Qty: 200 EACH | Refills: 3 | Status: SHIPPED | OUTPATIENT
Start: 2017-12-02 | End: 2018-03-02

## 2017-12-04 DIAGNOSIS — E78.00 HYPERCHOLESTEROLEMIA: ICD-10-CM

## 2017-12-04 DIAGNOSIS — Z82.49 FAMILY HISTORY OF PREMATURE CAD: ICD-10-CM

## 2017-12-04 DIAGNOSIS — Z91.89 CARDIOVASCULAR EVENT RISK: ICD-10-CM

## 2017-12-04 RX ORDER — IRBESARTAN 150 MG/1
TABLET ORAL
Qty: 90 TABLET | Refills: 2 | OUTPATIENT
Start: 2017-12-04

## 2017-12-04 RX ORDER — ATORVASTATIN CALCIUM 40 MG/1
40 TABLET, FILM COATED ORAL DAILY
Qty: 90 TABLET | Refills: 1 | Status: SHIPPED | OUTPATIENT
Start: 2017-12-04 | End: 2018-03-19 | Stop reason: SDUPTHER

## 2017-12-04 NOTE — TELEPHONE ENCOUNTER
Patient was called. She stated she Is not on the avapro. Might have called it in - in error. Dose she still need lab?

## 2017-12-04 NOTE — TELEPHONE ENCOUNTER
No lab needed, they both cause elevated potassium levels, would only need to check if taking two of them together.

## 2017-12-04 NOTE — TELEPHONE ENCOUNTER
Not supposed to be taking avapro, discontinued January 2017.  She is on lisinopril now.  Confirm what she is taking.  If on both needs bmp to check potassium and kidney function.

## 2017-12-06 RX ORDER — LIRAGLUTIDE 6 MG/ML
INJECTION SUBCUTANEOUS
Qty: 6 SYRINGE | Refills: 4 | Status: SHIPPED | OUTPATIENT
Start: 2017-12-06 | End: 2018-04-24

## 2017-12-07 DIAGNOSIS — I10 ESSENTIAL HYPERTENSION: ICD-10-CM

## 2017-12-07 RX ORDER — METOPROLOL SUCCINATE 25 MG/1
25 TABLET, EXTENDED RELEASE ORAL DAILY
Qty: 90 TABLET | Refills: 0 | Status: SHIPPED | OUTPATIENT
Start: 2017-12-07 | End: 2018-03-09 | Stop reason: SDUPTHER

## 2017-12-07 RX ORDER — FUROSEMIDE 20 MG/1
20 TABLET ORAL DAILY
Qty: 90 TABLET | Refills: 0 | Status: SHIPPED | OUTPATIENT
Start: 2017-12-07 | End: 2018-03-09 | Stop reason: SDUPTHER

## 2017-12-07 RX ORDER — LISINOPRIL 2.5 MG/1
2.5 TABLET ORAL DAILY
Qty: 90 TABLET | Refills: 0 | Status: SHIPPED | OUTPATIENT
Start: 2017-12-07 | End: 2017-12-08 | Stop reason: SDUPTHER

## 2017-12-08 ENCOUNTER — OFFICE VISIT (OUTPATIENT)
Dept: CARDIOLOGY | Facility: CLINIC | Age: 67
End: 2017-12-08
Payer: MEDICARE

## 2017-12-08 ENCOUNTER — LAB VISIT (OUTPATIENT)
Dept: LAB | Facility: HOSPITAL | Age: 67
End: 2017-12-08
Attending: INTERNAL MEDICINE
Payer: MEDICARE

## 2017-12-08 VITALS
SYSTOLIC BLOOD PRESSURE: 164 MMHG | HEIGHT: 62 IN | HEART RATE: 73 BPM | OXYGEN SATURATION: 97 % | WEIGHT: 170 LBS | BODY MASS INDEX: 31.28 KG/M2 | DIASTOLIC BLOOD PRESSURE: 84 MMHG | RESPIRATION RATE: 16 BRPM

## 2017-12-08 DIAGNOSIS — M79.7 FIBROMYALGIA: ICD-10-CM

## 2017-12-08 DIAGNOSIS — Z91.89 CARDIOVASCULAR EVENT RISK: Primary | ICD-10-CM

## 2017-12-08 DIAGNOSIS — Z91.89 CARDIOVASCULAR EVENT RISK: ICD-10-CM

## 2017-12-08 DIAGNOSIS — E78.00 HYPERCHOLESTEROLEMIA: ICD-10-CM

## 2017-12-08 DIAGNOSIS — I10 HYPERTENSION, UNSPECIFIED TYPE: Primary | ICD-10-CM

## 2017-12-08 DIAGNOSIS — Z79.1 NSAID LONG-TERM USE: ICD-10-CM

## 2017-12-08 DIAGNOSIS — E06.3 HASHIMOTO'S THYROIDITIS: ICD-10-CM

## 2017-12-08 DIAGNOSIS — E11.65 TYPE 2 DIABETES MELLITUS WITH HYPERGLYCEMIA, WITHOUT LONG-TERM CURRENT USE OF INSULIN: ICD-10-CM

## 2017-12-08 DIAGNOSIS — G43.011 INTRACTABLE MIGRAINE WITHOUT AURA AND WITH STATUS MIGRAINOSUS: ICD-10-CM

## 2017-12-08 DIAGNOSIS — I10 ESSENTIAL HYPERTENSION: ICD-10-CM

## 2017-12-08 DIAGNOSIS — E66.9 OBESITY (BMI 30-39.9): ICD-10-CM

## 2017-12-08 DIAGNOSIS — R53.82 CHRONIC FATIGUE: ICD-10-CM

## 2017-12-08 DIAGNOSIS — I10 HYPERTENSION, UNSPECIFIED TYPE: ICD-10-CM

## 2017-12-08 LAB
CHOLEST SERPL-MCNC: 126 MG/DL
CHOLEST/HDLC SERPL: 2.4 {RATIO}
HDLC SERPL-MCNC: 53 MG/DL
HDLC SERPL: 42.1 %
LDLC SERPL CALC-MCNC: 53.6 MG/DL
NONHDLC SERPL-MCNC: 73 MG/DL
TRIGL SERPL-MCNC: 97 MG/DL

## 2017-12-08 PROCEDURE — 93000 ELECTROCARDIOGRAM COMPLETE: CPT | Mod: S$GLB,,, | Performed by: INTERNAL MEDICINE

## 2017-12-08 PROCEDURE — 99999 PR PBB SHADOW E&M-EST. PATIENT-LVL III: CPT | Mod: PBBFAC,,, | Performed by: INTERNAL MEDICINE

## 2017-12-08 PROCEDURE — 80061 LIPID PANEL: CPT

## 2017-12-08 PROCEDURE — 36415 COLL VENOUS BLD VENIPUNCTURE: CPT

## 2017-12-08 PROCEDURE — 99215 OFFICE O/P EST HI 40 MIN: CPT | Mod: S$GLB,,, | Performed by: INTERNAL MEDICINE

## 2017-12-08 RX ORDER — LISINOPRIL 5 MG/1
5 TABLET ORAL DAILY
Qty: 30 TABLET | Refills: 11 | Status: SHIPPED | OUTPATIENT
Start: 2017-12-08 | End: 2018-03-09 | Stop reason: SDUPTHER

## 2017-12-14 ENCOUNTER — PATIENT MESSAGE (OUTPATIENT)
Dept: GASTROENTEROLOGY | Facility: CLINIC | Age: 67
End: 2017-12-14

## 2017-12-15 RX ORDER — METFORMIN HYDROCHLORIDE 500 MG/1
500 TABLET ORAL 2 TIMES DAILY WITH MEALS
Qty: 180 TABLET | Refills: 3 | Status: ON HOLD | OUTPATIENT
Start: 2017-12-15 | End: 2018-02-16

## 2018-01-02 ENCOUNTER — OFFICE VISIT (OUTPATIENT)
Dept: GASTROENTEROLOGY | Facility: CLINIC | Age: 68
End: 2018-01-02
Payer: MEDICARE

## 2018-01-02 VITALS
SYSTOLIC BLOOD PRESSURE: 135 MMHG | HEIGHT: 62 IN | HEART RATE: 78 BPM | WEIGHT: 169.56 LBS | DIASTOLIC BLOOD PRESSURE: 86 MMHG | BODY MASS INDEX: 31.2 KG/M2

## 2018-01-02 DIAGNOSIS — R10.9 ABDOMINAL PAIN, UNSPECIFIED ABDOMINAL LOCATION: Primary | ICD-10-CM

## 2018-01-02 DIAGNOSIS — F32.A DEPRESSION, UNSPECIFIED DEPRESSION TYPE: ICD-10-CM

## 2018-01-02 DIAGNOSIS — E88.810 DYSMETABOLIC SYNDROME: ICD-10-CM

## 2018-01-02 DIAGNOSIS — G43.011 INTRACTABLE MIGRAINE WITHOUT AURA AND WITH STATUS MIGRAINOSUS: ICD-10-CM

## 2018-01-02 DIAGNOSIS — R93.3 ABNORMAL CT SCAN, COLON: ICD-10-CM

## 2018-01-02 DIAGNOSIS — K58.2 IRRITABLE BOWEL SYNDROME WITH BOTH CONSTIPATION AND DIARRHEA: ICD-10-CM

## 2018-01-02 DIAGNOSIS — M51.36 DDD (DEGENERATIVE DISC DISEASE), LUMBAR: ICD-10-CM

## 2018-01-02 DIAGNOSIS — F41.9 ANXIETY: ICD-10-CM

## 2018-01-02 PROCEDURE — 99214 OFFICE O/P EST MOD 30 MIN: CPT | Mod: S$GLB,,, | Performed by: INTERNAL MEDICINE

## 2018-01-02 PROCEDURE — 99999 PR PBB SHADOW E&M-EST. PATIENT-LVL III: CPT | Mod: PBBFAC,,, | Performed by: INTERNAL MEDICINE

## 2018-01-03 NOTE — PROGRESS NOTES
Subjective:       Patient ID: Franca Coffey is a 67 y.o. female.    This is an established patient.      Chief Complaint:Abdominal pain    Abdominal Pain   This is a new problem. The current episode started more than 1 month ago. The onset quality is undetermined. The problem occurs daily. Duration: variable. The problem has been waxing and waning. The pain is located in the LLQ. The pain is at a severity of 5/10. The pain is moderate. The quality of the pain is aching and cramping. The abdominal pain does not radiate. Associated symptoms include constipation and diarrhea. Pertinent negatives include no dysuria, fever, headaches, hematochezia, hematuria, melena, nausea, vomiting or weight loss. Associated symptoms comments: Has 2-3 days of no BM followed by worsening abdominal cramping with multiple loose BMs which are nonbloody.  After this, the cycle repeats.  She denies adequate fluids or high fiber diet.  . Nothing aggravates the pain. The pain is relieved by bowel movements. She has tried nothing for the symptoms. The treatment provided no relief. Prior diagnostic workup includes CT scan (CT reviewed from last year and showed significant amount of stool in right colon with decompression/compression of left colon). Her past medical history is significant for irritable bowel syndrome.         Review of Systems   Constitutional: Negative for chills, fatigue, fever and weight loss.   HENT: Negative for sore throat and trouble swallowing.    Eyes: Negative for photophobia and visual disturbance.   Respiratory: Negative for cough, shortness of breath and wheezing.    Cardiovascular: Negative for chest pain and palpitations.   Gastrointestinal: Positive for abdominal pain, constipation and diarrhea. Negative for blood in stool, hematochezia, melena, nausea and vomiting.   Genitourinary: Negative for dysuria and hematuria.   Neurological: Negative for dizziness and headaches.   All other systems reviewed and  are negative.      Objective:      Physical Exam   Constitutional: She is oriented to person, place, and time. She appears well-developed and well-nourished.   HENT:   Head: Normocephalic and atraumatic.   Eyes: Pupils are equal, round, and reactive to light. No scleral icterus.   Neck: Normal range of motion.   Cardiovascular: Normal rate and regular rhythm.    No murmur heard.  Pulmonary/Chest: Effort normal and breath sounds normal. She has no wheezes.   Abdominal: Soft. Bowel sounds are normal. She exhibits no distension. There is tenderness (LLQ).   Lymphadenopathy:     She has no cervical adenopathy.   Neurological: She is alert and oriented to person, place, and time.   Skin: Skin is warm and dry. No rash noted.       Lab Results   Component Value Date    WBC 5.76 08/24/2017    HGB 12.6 08/24/2017    HCT 37.9 08/24/2017    MCV 90 08/24/2017     08/24/2017         Further history was obtained from the patient's  who was present throughout the interview and states that she has been using quite a bit of imodium.    CT scan was independently visualized and reviewed by me and showed constipation.      Assessment:       1. Abdominal pain, unspecified abdominal location    2. Irritable bowel syndrome with both constipation and diarrhea    3. Abnormal CT scan, colon    4. DDD (degenerative disc disease), lumbar    5. Intractable migraine without aura and with status migrainosus, onset 9/2017    6. Anxiety    7. Depression, unspecified depression type    8. Dysmetabolic syndrome        Plan:       1.  Continue PPI  2.  Proven risks of long term PPI use, including pneumonia, c.diff infection, and bone loss, as well as theoretical risks including dementia and CKD, were discussed with the patient at length and the patient understands risks and benefits of therapy.  Option of tapering/weaning PPI away was also discussed, including the need for possible long term therapy to treat symptoms if they recur after  cessation of medication, as well as to mitigate the risk of developing complications of reflux such as Vasquez's esophagus and/or esophageal cancer.  Patient understands the risks and benefits of treatment with drug versus cessation.  Daily supplementation with MVI with calcium and vitamin D were recommended as was follow up with PCP for bone scan when appropriate.  Labs including B12, folate, CMP, CBC, calcium, and magnesium should be checked at least annually.  3.  Minimize imodium  4.  Recommend daily exercise, adequate water intake, and high fiber diet.  Recommend daily miralax (17g PO once or twice daily) with intermittently dosed dulcolax (every 2-3 days)  to facilitate bowel movements.  If no relief with this, consider adding emollient laxative (castor oil or mineral oil) +/- enema.  5.  If no relief with above, then try Linzess  6.  Further recommendations to follow after above.  Check stool for h.pylori

## 2018-01-04 ENCOUNTER — LAB VISIT (OUTPATIENT)
Dept: LAB | Facility: HOSPITAL | Age: 68
End: 2018-01-04
Attending: INTERNAL MEDICINE
Payer: MEDICARE

## 2018-01-04 DIAGNOSIS — R10.9 ABDOMINAL PAIN, UNSPECIFIED ABDOMINAL LOCATION: ICD-10-CM

## 2018-01-04 PROCEDURE — 87338 HPYLORI STOOL AG IA: CPT

## 2018-01-09 DIAGNOSIS — M79.7 FIBROMYALGIA: ICD-10-CM

## 2018-01-09 DIAGNOSIS — G89.29 CHRONIC LEFT-SIDED LOW BACK PAIN WITH LEFT-SIDED SCIATICA: ICD-10-CM

## 2018-01-09 DIAGNOSIS — M54.31 SCIATICA OF RIGHT SIDE: ICD-10-CM

## 2018-01-09 DIAGNOSIS — M54.42 CHRONIC LEFT-SIDED LOW BACK PAIN WITH LEFT-SIDED SCIATICA: ICD-10-CM

## 2018-01-09 RX ORDER — GABAPENTIN 100 MG/1
CAPSULE ORAL
Qty: 30 CAPSULE | Refills: 0 | Status: SHIPPED | OUTPATIENT
Start: 2018-01-09 | End: 2018-03-29 | Stop reason: SDUPTHER

## 2018-01-10 LAB — H PYLORI AG STL QL: NOT DETECTED

## 2018-01-21 DIAGNOSIS — M79.7 FIBROMYALGIA: ICD-10-CM

## 2018-01-22 RX ORDER — DULOXETIN HYDROCHLORIDE 60 MG/1
CAPSULE, DELAYED RELEASE ORAL
Qty: 180 CAPSULE | Refills: 0 | Status: SHIPPED | OUTPATIENT
Start: 2018-01-22 | End: 2018-03-19 | Stop reason: SDUPTHER

## 2018-01-23 DIAGNOSIS — M25.551 RIGHT HIP PAIN: Primary | ICD-10-CM

## 2018-01-23 DIAGNOSIS — M25.552 LEFT HIP PAIN: Primary | ICD-10-CM

## 2018-01-25 ENCOUNTER — OFFICE VISIT (OUTPATIENT)
Dept: ORTHOPEDICS | Facility: CLINIC | Age: 68
End: 2018-01-25
Payer: MEDICARE

## 2018-01-25 ENCOUNTER — HOSPITAL ENCOUNTER (OUTPATIENT)
Dept: RADIOLOGY | Facility: HOSPITAL | Age: 68
Discharge: HOME | End: 2018-01-25
Attending: ORTHOPAEDIC SURGERY
Payer: MEDICARE

## 2018-01-25 VITALS
BODY MASS INDEX: 31.1 KG/M2 | WEIGHT: 169 LBS | DIASTOLIC BLOOD PRESSURE: 84 MMHG | HEIGHT: 62 IN | SYSTOLIC BLOOD PRESSURE: 162 MMHG | HEART RATE: 78 BPM

## 2018-01-25 DIAGNOSIS — M70.62 TROCHANTERIC BURSITIS, LEFT HIP: Primary | ICD-10-CM

## 2018-01-25 DIAGNOSIS — M25.561 RIGHT KNEE PAIN, UNSPECIFIED CHRONICITY: Primary | ICD-10-CM

## 2018-01-25 DIAGNOSIS — M25.552 LEFT HIP PAIN: ICD-10-CM

## 2018-01-25 PROCEDURE — 99213 OFFICE O/P EST LOW 20 MIN: CPT | Mod: 25,S$GLB,, | Performed by: ORTHOPAEDIC SURGERY

## 2018-01-25 PROCEDURE — 73502 X-RAY EXAM HIP UNI 2-3 VIEWS: CPT | Mod: TC,PN,LT

## 2018-01-25 PROCEDURE — 73502 X-RAY EXAM HIP UNI 2-3 VIEWS: CPT | Mod: 26,LT,, | Performed by: RADIOLOGY

## 2018-01-25 PROCEDURE — 99999 PR PBB SHADOW E&M-EST. PATIENT-LVL III: CPT | Mod: PBBFAC,,, | Performed by: ORTHOPAEDIC SURGERY

## 2018-01-25 PROCEDURE — 20610 DRAIN/INJ JOINT/BURSA W/O US: CPT | Mod: LT,S$GLB,, | Performed by: ORTHOPAEDIC SURGERY

## 2018-01-25 RX ADMIN — TRIAMCINOLONE ACETONIDE 40 MG: 40 INJECTION, SUSPENSION INTRA-ARTICULAR; INTRAMUSCULAR at 08:01

## 2018-01-26 ENCOUNTER — HOSPITAL ENCOUNTER (OUTPATIENT)
Dept: RADIOLOGY | Facility: HOSPITAL | Age: 68
Discharge: HOME | End: 2018-01-26
Attending: ORTHOPAEDIC SURGERY
Payer: MEDICARE

## 2018-01-26 DIAGNOSIS — M25.561 RIGHT KNEE PAIN, UNSPECIFIED CHRONICITY: ICD-10-CM

## 2018-01-26 PROCEDURE — 73721 MRI JNT OF LWR EXTRE W/O DYE: CPT | Mod: 26,RT,, | Performed by: RADIOLOGY

## 2018-01-26 PROCEDURE — 73721 MRI JNT OF LWR EXTRE W/O DYE: CPT | Mod: TC,RT

## 2018-01-29 ENCOUNTER — TELEPHONE (OUTPATIENT)
Dept: ORTHOPEDICS | Facility: CLINIC | Age: 68
End: 2018-01-29

## 2018-01-29 RX ORDER — TRIAMCINOLONE ACETONIDE 40 MG/ML
40 INJECTION, SUSPENSION INTRA-ARTICULAR; INTRAMUSCULAR
Status: DISCONTINUED | OUTPATIENT
Start: 2018-01-25 | End: 2018-01-29 | Stop reason: HOSPADM

## 2018-01-29 NOTE — PROCEDURES
Large Joint Aspiration/Injection  Date/Time: 1/25/2018 8:21 AM  Performed by: ZACH SHARMA  Authorized by: ZACH SHARMA     Consent Done?:  Yes (Verbal)  Indications:  Pain  Procedure site marked: Yes    Timeout: Prior to procedure the correct patient, procedure, and site was verified      Location:  Hip  Site:  L greater trochanteric bursa  Prep: Patient was prepped and draped in usual sterile fashion    Ultrasonic Guidance for needle placement: No  Needle size:  20 G  Approach:  Lateral  Medications:  40 mg triamcinolone acetonide 40 mg/mL  Patient tolerance:  Patient tolerated the procedure well with no immediate complications

## 2018-01-29 NOTE — TELEPHONE ENCOUNTER
Called pt to make appointment to review mri results. Unable to leave message due to full inbox. Will attempt to call at a later time.
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
## 2018-02-01 ENCOUNTER — OFFICE VISIT (OUTPATIENT)
Dept: ORTHOPEDICS | Facility: CLINIC | Age: 68
End: 2018-02-01
Payer: MEDICARE

## 2018-02-01 VITALS
SYSTOLIC BLOOD PRESSURE: 143 MMHG | BODY MASS INDEX: 31.1 KG/M2 | HEART RATE: 72 BPM | HEIGHT: 62 IN | WEIGHT: 169 LBS | DIASTOLIC BLOOD PRESSURE: 68 MMHG

## 2018-02-01 DIAGNOSIS — M17.11 ARTHRITIS OF RIGHT KNEE: Primary | ICD-10-CM

## 2018-02-01 PROCEDURE — 99999 PR PBB SHADOW E&M-EST. PATIENT-LVL III: CPT | Mod: PBBFAC,,, | Performed by: ORTHOPAEDIC SURGERY

## 2018-02-01 PROCEDURE — 97760 ORTHOTIC MGMT&TRAING 1ST ENC: CPT | Mod: S$GLB,,, | Performed by: ORTHOPAEDIC SURGERY

## 2018-02-01 PROCEDURE — 1125F AMNT PAIN NOTED PAIN PRSNT: CPT | Mod: S$GLB,,, | Performed by: ORTHOPAEDIC SURGERY

## 2018-02-01 PROCEDURE — 99213 OFFICE O/P EST LOW 20 MIN: CPT | Mod: 25,S$GLB,, | Performed by: ORTHOPAEDIC SURGERY

## 2018-02-01 PROCEDURE — 3008F BODY MASS INDEX DOCD: CPT | Mod: S$GLB,,, | Performed by: ORTHOPAEDIC SURGERY

## 2018-02-01 PROCEDURE — 20610 DRAIN/INJ JOINT/BURSA W/O US: CPT | Mod: RT,S$GLB,, | Performed by: ORTHOPAEDIC SURGERY

## 2018-02-01 PROCEDURE — 1159F MED LIST DOCD IN RCRD: CPT | Mod: S$GLB,,, | Performed by: ORTHOPAEDIC SURGERY

## 2018-02-01 RX ORDER — TRIAMCINOLONE ACETONIDE 40 MG/ML
40 INJECTION, SUSPENSION INTRA-ARTICULAR; INTRAMUSCULAR
Status: DISCONTINUED | OUTPATIENT
Start: 2018-02-01 | End: 2018-02-01 | Stop reason: HOSPADM

## 2018-02-01 RX ADMIN — TRIAMCINOLONE ACETONIDE 40 MG: 40 INJECTION, SUSPENSION INTRA-ARTICULAR; INTRAMUSCULAR at 03:02
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
## 2018-02-01 NOTE — PROCEDURES
Large Joint Aspiration/Injection  Date/Time: 2/1/2018 3:36 PM  Performed by: ZACH SHARMA  Authorized by: ZACH SHARMA     Consent Done?:  Yes (Verbal)  Indications:  Pain  Procedure site marked: Yes    Timeout: Prior to procedure the correct patient, procedure, and site was verified      Location:  Knee  Site:  R knee  Prep: Patient was prepped and draped in usual sterile fashion    Needle size:  20 G  Approach:  Anterolateral  Medications:  40 mg triamcinolone acetonide 40 mg/mL  Patient tolerance:  Patient tolerated the procedure well with no immediate complications

## 2018-02-05 ENCOUNTER — OFFICE VISIT (OUTPATIENT)
Dept: PAIN MEDICINE | Facility: CLINIC | Age: 68
End: 2018-02-05
Payer: MEDICARE

## 2018-02-05 VITALS
DIASTOLIC BLOOD PRESSURE: 87 MMHG | HEIGHT: 62 IN | SYSTOLIC BLOOD PRESSURE: 148 MMHG | WEIGHT: 167 LBS | BODY MASS INDEX: 30.73 KG/M2 | HEART RATE: 64 BPM

## 2018-02-05 DIAGNOSIS — M54.16 LUMBAR RADICULITIS: ICD-10-CM

## 2018-02-05 DIAGNOSIS — M51.36 DDD (DEGENERATIVE DISC DISEASE), LUMBAR: Primary | ICD-10-CM

## 2018-02-05 DIAGNOSIS — M47.896 OTHER SPONDYLOSIS, LUMBAR REGION: ICD-10-CM

## 2018-02-05 PROCEDURE — 99999 PR PBB SHADOW E&M-EST. PATIENT-LVL IV: CPT | Mod: PBBFAC,,, | Performed by: ANESTHESIOLOGY

## 2018-02-05 PROCEDURE — 3008F BODY MASS INDEX DOCD: CPT | Mod: S$GLB,,, | Performed by: ANESTHESIOLOGY

## 2018-02-05 PROCEDURE — 1125F AMNT PAIN NOTED PAIN PRSNT: CPT | Mod: S$GLB,,, | Performed by: ANESTHESIOLOGY

## 2018-02-05 PROCEDURE — 1159F MED LIST DOCD IN RCRD: CPT | Mod: S$GLB,,, | Performed by: ANESTHESIOLOGY

## 2018-02-05 PROCEDURE — 99204 OFFICE O/P NEW MOD 45 MIN: CPT | Mod: S$GLB,,, | Performed by: ANESTHESIOLOGY

## 2018-02-05 RX ORDER — LISINOPRIL 2.5 MG/1
TABLET ORAL
Refills: 0 | Status: ON HOLD | COMMUNITY
Start: 2017-12-23 | End: 2018-02-16
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
## 2018-02-08 DIAGNOSIS — M48.8X6 OTHER SPECIFIED SPONDYLOPATHIES, LUMBAR REGION: Primary | ICD-10-CM

## 2018-02-12 ENCOUNTER — TELEPHONE (OUTPATIENT)
Dept: FAMILY MEDICINE | Facility: CLINIC | Age: 68
End: 2018-02-12

## 2018-02-12 DIAGNOSIS — J11.1 FLU: Primary | ICD-10-CM

## 2018-02-12 RX ORDER — OSELTAMIVIR PHOSPHATE 75 MG/1
75 CAPSULE ORAL 2 TIMES DAILY
Qty: 10 CAPSULE | Refills: 0 | Status: SHIPPED | OUTPATIENT
Start: 2018-02-12 | End: 2018-02-17

## 2018-02-12 NOTE — TELEPHONE ENCOUNTER
----- Message from Kell Damico sent at 2/12/2018 12:55 PM CST -----  Contact: self  Patient requesting same day appt for cough, sneezing, running nose, fever, sore throat, diarrhea, and headache. Please call back [phone] (home)

## 2018-02-16 ENCOUNTER — HOSPITAL ENCOUNTER (OUTPATIENT)
Facility: AMBULARY SURGERY CENTER | Age: 68
Discharge: HOME | End: 2018-02-16
Attending: ANESTHESIOLOGY | Admitting: ANESTHESIOLOGY
Payer: MEDICARE

## 2018-02-16 ENCOUNTER — SURGERY (OUTPATIENT)
Age: 68
End: 2018-02-16

## 2018-02-16 DIAGNOSIS — M47.896 OTHER SPONDYLOSIS, LUMBAR REGION: Primary | ICD-10-CM

## 2018-02-16 PROCEDURE — 64636 DESTROY L/S FACET JNT ADDL: CPT | Mod: RT | Performed by: ANESTHESIOLOGY

## 2018-02-16 PROCEDURE — 64636 DESTROY L/S FACET JNT ADDL: CPT | Mod: 50,,, | Performed by: ANESTHESIOLOGY

## 2018-02-16 PROCEDURE — 64635 DESTROY LUMB/SAC FACET JNT: CPT | Mod: RT | Performed by: ANESTHESIOLOGY

## 2018-02-16 PROCEDURE — 64635 DESTROY LUMB/SAC FACET JNT: CPT | Mod: 50,,, | Performed by: ANESTHESIOLOGY

## 2018-02-16 PROCEDURE — 99152 MOD SED SAME PHYS/QHP 5/>YRS: CPT | Mod: ,,, | Performed by: ANESTHESIOLOGY

## 2018-02-16 RX ORDER — MIDAZOLAM HYDROCHLORIDE 1 MG/ML
INJECTION INTRAMUSCULAR; INTRAVENOUS
Status: DISCONTINUED
Start: 2018-02-16 | End: 2018-02-16 | Stop reason: HOSPADM

## 2018-02-16 RX ORDER — FENTANYL CITRATE 50 UG/ML
INJECTION, SOLUTION INTRAMUSCULAR; INTRAVENOUS
Status: DISCONTINUED
Start: 2018-02-16 | End: 2018-02-16 | Stop reason: HOSPADM

## 2018-02-16 RX ORDER — DEXAMETHASONE SODIUM PHOSPHATE 10 MG/ML
INJECTION INTRAMUSCULAR; INTRAVENOUS
Status: DISCONTINUED | OUTPATIENT
Start: 2018-02-16 | End: 2018-02-16 | Stop reason: HOSPADM

## 2018-02-16 RX ORDER — LIDOCAINE HYDROCHLORIDE 10 MG/ML
INJECTION, SOLUTION EPIDURAL; INFILTRATION; INTRACAUDAL; PERINEURAL
Status: DISCONTINUED | OUTPATIENT
Start: 2018-02-16 | End: 2018-02-16 | Stop reason: HOSPADM

## 2018-02-16 RX ORDER — LIDOCAINE HYDROCHLORIDE 20 MG/ML
INJECTION, SOLUTION EPIDURAL; INFILTRATION; INTRACAUDAL; PERINEURAL
Status: DISCONTINUED | OUTPATIENT
Start: 2018-02-16 | End: 2018-02-16 | Stop reason: HOSPADM

## 2018-02-16 RX ORDER — FENTANYL CITRATE 50 UG/ML
INJECTION, SOLUTION INTRAMUSCULAR; INTRAVENOUS
Status: DISCONTINUED | OUTPATIENT
Start: 2018-02-16 | End: 2018-02-16 | Stop reason: HOSPADM

## 2018-02-16 RX ORDER — MIDAZOLAM HYDROCHLORIDE 2 MG/2ML
INJECTION, SOLUTION INTRAMUSCULAR; INTRAVENOUS
Status: DISCONTINUED | OUTPATIENT
Start: 2018-02-16 | End: 2018-02-16 | Stop reason: HOSPADM

## 2018-02-16 RX ORDER — HYDROCODONE BITARTRATE AND ACETAMINOPHEN 7.5; 325 MG/1; MG/1
1 TABLET ORAL EVERY 8 HOURS PRN
Qty: 40 TABLET | Refills: 0 | Status: SHIPPED | OUTPATIENT
Start: 2018-02-16 | End: 2018-03-18

## 2018-02-16 RX ORDER — SODIUM CHLORIDE, SODIUM LACTATE, POTASSIUM CHLORIDE, CALCIUM CHLORIDE 600; 310; 30; 20 MG/100ML; MG/100ML; MG/100ML; MG/100ML
INJECTION, SOLUTION INTRAVENOUS ONCE AS NEEDED
Status: COMPLETED | OUTPATIENT
Start: 2018-02-16 | End: 2018-02-16

## 2018-02-16 RX ORDER — LIDOCAINE HYDROCHLORIDE 10 MG/ML
INJECTION, SOLUTION EPIDURAL; INFILTRATION; INTRACAUDAL; PERINEURAL
Status: DISCONTINUED
Start: 2018-02-16 | End: 2018-02-16 | Stop reason: HOSPADM

## 2018-02-16 RX ORDER — LIDOCAINE HYDROCHLORIDE 20 MG/ML
INJECTION, SOLUTION EPIDURAL; INFILTRATION; INTRACAUDAL; PERINEURAL
Status: DISCONTINUED
Start: 2018-02-16 | End: 2018-02-16 | Stop reason: HOSPADM

## 2018-02-16 RX ORDER — BUPIVACAINE HYDROCHLORIDE 2.5 MG/ML
INJECTION, SOLUTION EPIDURAL; INFILTRATION; INTRACAUDAL
Status: DISCONTINUED
Start: 2018-02-16 | End: 2018-02-16 | Stop reason: HOSPADM

## 2018-02-16 RX ORDER — BUPIVACAINE HYDROCHLORIDE 2.5 MG/ML
INJECTION, SOLUTION EPIDURAL; INFILTRATION; INTRACAUDAL
Status: DISCONTINUED | OUTPATIENT
Start: 2018-02-16 | End: 2018-02-16 | Stop reason: HOSPADM

## 2018-02-16 RX ORDER — DEXAMETHASONE SODIUM PHOSPHATE 10 MG/ML
INJECTION INTRAMUSCULAR; INTRAVENOUS
Status: DISCONTINUED
Start: 2018-02-16 | End: 2018-02-16 | Stop reason: HOSPADM

## 2018-02-16 RX ADMIN — FENTANYL CITRATE 50 MCG: 50 INJECTION, SOLUTION INTRAMUSCULAR; INTRAVENOUS at 12:02

## 2018-02-16 RX ADMIN — MIDAZOLAM HYDROCHLORIDE 2 MG: 2 INJECTION, SOLUTION INTRAMUSCULAR; INTRAVENOUS at 12:02

## 2018-02-16 RX ADMIN — LIDOCAINE HYDROCHLORIDE 6 ML: 20 INJECTION, SOLUTION EPIDURAL; INFILTRATION; INTRACAUDAL; PERINEURAL at 12:02

## 2018-02-16 RX ADMIN — FENTANYL CITRATE 25 MCG: 50 INJECTION, SOLUTION INTRAMUSCULAR; INTRAVENOUS at 12:02

## 2018-02-16 RX ADMIN — BUPIVACAINE HYDROCHLORIDE 6 ML: 2.5 INJECTION, SOLUTION EPIDURAL; INFILTRATION; INTRACAUDAL at 12:02

## 2018-02-16 RX ADMIN — LIDOCAINE HYDROCHLORIDE 10 ML: 10 INJECTION, SOLUTION EPIDURAL; INFILTRATION; INTRACAUDAL; PERINEURAL at 12:02

## 2018-02-16 RX ADMIN — SODIUM CHLORIDE, SODIUM LACTATE, POTASSIUM CHLORIDE, CALCIUM CHLORIDE: 600; 310; 30; 20 INJECTION, SOLUTION INTRAVENOUS at 12:02

## 2018-02-16 RX ADMIN — DEXAMETHASONE SODIUM PHOSPHATE 10 MG: 10 INJECTION INTRAMUSCULAR; INTRAVENOUS at 12:02

## 2018-02-16 NOTE — PLAN OF CARE
Pt states ready to go home , stable, jessica po fluids, denies pain. Injection sites  at lower back PHILIPP, no drainage noted.. Able to lit legs of bed, ambulatory. W/c to bathroom voied w/ spouse assistance. W/c to car accompanied by nurse. Home w/ spouse.

## 2018-02-16 NOTE — DISCHARGE INSTRUCTIONS
Recovery After Procedural Sedation (Adult)  You have been given medicine by vein to make you sleep during your surgery. This may have included both a pain medicine and sleeping medicine. Most of the effects have worn off. But you may still have some drowsiness for the next 6 to 8 hours.  Home care  Follow these guidelines when you get home:  · For the next 8 hours, you should be watched by a responsible adult. This person should make sure your condition is not getting worse.  · Don't drink any alcohol for the next 24 hours.  · Don't drive, operate dangerous machinery, or make important business or personal decisions during the next 24 hours.  Note: Your healthcare provider may tell you not to take any medicine by mouth for pain or sleep in the next 4 hours. These medicines may react with the medicines you were given in the hospital. This could cause a much stronger response than usual.  Follow-up care  Follow up with your healthcare provider if you are not alert and back to your usual level of activity within 12 hours.  When to seek medical advice  Call your healthcare provider right away if any of these occur:  · Drowsiness gets worse  · Weakness or dizziness gets worse  · Repeated vomiting  · You can't be awakened   Date Last Reviewed: 10/18/2016  © 6480-7118 [employer]. [address] [place] [address], [place] [address]. All rights reserved. This information is not intended as a substitute for professional medical care. Always follow your healthcare professional's instructions.      Radiofrequency Thermocoagulation Recovery at Home  What to know about pain relief  An injection to reduce inflammation takes a few days to work, sometimes even up to a week. There may even be more pain at first.  Tips for recovery  · You may use an ice pack at your injection site for comfort.  · You may shower this evening.   · Do not use a heating pad or take tub baths or swim for 2 days.  · Take your usual medication for  pain if needed.  · Gradually increase your activities.  · Dont lift anything over 10 lbs for the first 24 hours  · Dont drive the day of the procedure.  · Wait until tomorrow to resume any blood thinners (aspirin, Plavix, Coumadin) but you may resume all your other medications today.  · Numbing medicine was injected that may affect the nerves that carry information from your muscles to your brain and vice versa.  · Numbness can last up to 6 hrs, so be very careful walking  When to call your doctor  Call right away if you notice any of the following symptoms:  · Severe pain or headache  · Fever or chills  · Redness or swelling around the injection site   · Difficulty breathing  · Vomiting  · Increasing numbness or weakness in arms or legs  · If you are diabetic, a steroid injection can increase your blood sugar so moniter it carefully.

## 2018-02-16 NOTE — OP NOTE
PROCEDURE DATE: 2/16/2018    PROCEDURE:  Radiofrequency ablation of the L3,4,5 medial branch nerves on the bilateral-side utilizing fluoroscopy    DIAGNOSIS:  Other lumbar spondylosis  Post op Diagnosis: Same    PHYSICIAN: Delmar Rosas MD    MEDICATIONS INJECTED:  From a mixture of 6ml of 0.25% bupivicaine and 10mg of dexamethasone,  1ml of this solution was injected at each level.    LOCAL ANESTHETIC USED: Lidocaine 1%, 2 ml given at each site.    SEDATION MEDICATIONS: RN IV sedation    ESTIMATED BLOOD LOSS:  None    COMPLICATIONS:  None    TECHNIQUE:  A time out was taken to identify patient and procedure side prior to starting the procedure. Laying in a prone position, the patient was prepped and draped in the usual sterile fashion using ChloraPrep and sterile towels.  The levels were determined under fluoroscopic guidance and then marked.  Local anesthetic was given by raising a wheal at the skin over each site and then infiltrated approximately 2cm deeper.  A 20-gauge  100 mm RF needle was introduced to the anatomic location of the bilateral L3,4,5 medial branch nerves. S Motor stimulation up to 2 Volts at each level confirmed no motor nerve involvement.  Impedance was less than 800 ohms at each level.  1ml of 2% lidocaine was instilled prior to lesioning.  Ablation was performed per level utilizing radiofrequency generator 80°C for 60 seconds.  Needles were then rotated 90° and a second lesion was performed.  The above noted medication was then injected slowly. The patient tolerated the procedure well.     The patient was monitored after the procedure.  Patient was given post procedure and discharge instructions to follow at home.  The patient was discharged in a stable condition

## 2018-02-16 NOTE — DISCHARGE SUMMARY
[facility]  Discharge Note  Short Stay    Admit Date: 2/16/2018    Discharge Date and Time: 2/16/2018    Attending Physician: Delmar Rosas MD     Discharge Provider: Delmar Rosas    Diagnoses:  Active Hospital Problems    Diagnosis  POA    *Other spondylosis, lumbar region [M47.896]  Yes      Resolved Hospital Problems    Diagnosis Date Resolved POA   No resolved problems to display.       Hospital Course: Lumbar MB RFA  Discharged Condition: Good    Final Diagnoses:   Active Hospital Problems    Diagnosis  POA    *Other spondylosis, lumbar region [M47.896]  Yes      Resolved Hospital Problems    Diagnosis Date Resolved POA   No resolved problems to display.       Disposition: Home or Self Care    Follow up/Patient Instructions:    Medications:  Reconciled Home Medications:   Current Discharge Medication List      CONTINUE these medications which have NOT CHANGED    Details   chlordiazepoxide-clidinium 5-2.5 mg (LIBRAX) 5-2.5 mg Cap Take by mouth. 2 qam, 1 q noon, 1 qhs  Refills: 0      DULoxetine (CYMBALTA) 60 MG capsule TAKE 1 CAPSULE TWICE DAILY  Qty: 180 capsule, Refills: 0    Associated Diagnoses: Fibromyalgia      furosemide (LASIX) 20 MG tablet Take 1 tablet (20 mg total) by mouth once daily.  Qty: 90 tablet, Refills: 0    Associated Diagnoses: Essential hypertension      levothyroxine (SYNTHROID) 75 MCG tablet TAKE 1 TABLET DAILY BEFORE BREAKFAST  Qty: 90 tablet, Refills: 2      lisinopril (PRINIVIL,ZESTRIL) 5 MG tablet Take 1 tablet (5 mg total) by mouth once daily.  Qty: 30 tablet, Refills: 11    Associated Diagnoses: Hypertension, unspecified type; Type 2 diabetes mellitus with hyperglycemia, without long-term current use of insulin      metoprolol succinate (TOPROL-XL) 25 MG 24 hr tablet Take 1 tablet (25 mg total) by mouth once daily.  Qty: 90 tablet, Refills: 0    Associated Diagnoses: Essential hypertension      oseltamivir (TAMIFLU) 75 MG capsule Take 1 capsule (75 mg total) by mouth 2 (two)  times daily.  Qty: 10 capsule, Refills: 0    Associated Diagnoses: Flu      atorvastatin (LIPITOR) 40 MG tablet Take 1 tablet (40 mg total) by mouth once daily.  Qty: 90 tablet, Refills: 1    Comments: Need office visit before next refill, last seen 3/2017. This will be the LAST Rx if visit is not made.  Associated Diagnoses: Family history of premature CAD; Cardiovascular event risk; Hypercholesterolemia      BD ALCOHOL SWABS PadM USE AS DIRECTED FOR TESTING BLOOD SUGAR  Qty: 200 each, Refills: 3      doxycycline (MONODOX) 100 MG capsule Take 1 capsule (100 mg total) by mouth 2 (two) times daily.  Qty: 20 capsule, Refills: 0    Associated Diagnoses: Acute cystitis with hematuria      esomeprazole (NEXIUM) 40 MG capsule TAKE 1 CAPSULE EVERY DAY  Qty: 90 capsule, Refills: 1    Associated Diagnoses: Heartburn      !! gabapentin (NEURONTIN) 100 MG capsule take 1 capsule by mouth once daily  Qty: 30 capsule, Refills: 0    Associated Diagnoses: Sciatica of right side; Chronic left-sided low back pain with left-sided sciatica; Fibromyalgia      !! gabapentin (NEURONTIN) 300 MG capsule Take 1 capsule (300 mg total) by mouth 2 (two) times daily.  Qty: 180 capsule, Refills: 1    Associated Diagnoses: Sciatica of right side; Chronic left-sided low back pain with left-sided sciatica; Fibromyalgia      hydrocodone-acetaminophen 7.5-325mg (NORCO) 7.5-325 mg per tablet Take 1 tablet by mouth every 6 (six) hours as needed for Pain.  Qty: 20 tablet, Refills: 0    Associated Diagnoses: Dysuria; Renal colic on left side      naproxen (EC NAPROSYN) 500 MG EC tablet Take 1 tablet (500 mg total) by mouth 2 (two) times daily.  Qty: 60 tablet, Refills: 2    Associated Diagnoses: Fibromyalgia      tamsulosin (FLOMAX) 0.4 mg Cp24 Take 1 capsule (0.4 mg total) by mouth once daily.  Qty: 30 capsule, Refills: 0    Associated Diagnoses: Renal colic on left side      topiramate (TOPAMAX) 50 MG tablet TAKE 1 TABLET ONE TIME DAILY  Qty: 90 tablet,  Refills: 3      TRUE METRIX AIR GLUCOSE METER kit       TRUE METRIX GLUCOSE TEST STRIP Strp       TRUE METRIX LEVEL 1 Soln       TRUEPLUS LANCETS 28 gauge Misc       VICTOZA 2-KENJI 0.6 mg/0.1 mL (18 mg/3 mL) PnIj inject 0.6 milligram subcutaneously daily AFTER 2 WEEKS ADVANCE TO 1.2MG  Qty: 6 Syringe, Refills: 4       !! - Potential duplicate medications found. Please discuss with provider.      STOP taking these medications       metFORMIN (GLUCOPHAGE) 500 MG tablet Comments:   Reason for Stopping:               Discharge Procedure Orders  Call MD for:  temperature >100.4     Call MD for:  persistent nausea and vomiting or diarrhea     Call MD for:  severe uncontrolled pain     Call MD for:  redness, tenderness, or signs of infection (pain, swelling, redness, odor or green/yellow discharge around incision site)     Call MD for:  difficulty breathing or increased cough     Call MD for:  severe persistent headache          Follow up with MD in 2-3 weeks    Discharge Procedure Orders (must include Diet, Follow-up, Activity):    Discharge Procedure Orders (must include Diet, Follow-up, Activity)  Call MD for:  temperature >100.4     Call MD for:  persistent nausea and vomiting or diarrhea     Call MD for:  severe uncontrolled pain     Call MD for:  redness, tenderness, or signs of infection (pain, swelling, redness, odor or green/yellow discharge around incision site)     Call MD for:  difficulty breathing or increased cough     Call MD for:  severe persistent headache

## 2018-02-19 VITALS
BODY MASS INDEX: 30.73 KG/M2 | DIASTOLIC BLOOD PRESSURE: 76 MMHG | RESPIRATION RATE: 18 BRPM | TEMPERATURE: 98 F | WEIGHT: 167 LBS | OXYGEN SATURATION: 97 % | SYSTOLIC BLOOD PRESSURE: 154 MMHG | HEART RATE: 63 BPM | HEIGHT: 62 IN

## 2018-02-23 ENCOUNTER — OFFICE VISIT (OUTPATIENT)
Dept: ENDOCRINOLOGY | Facility: CLINIC | Age: 68
End: 2018-02-23
Payer: MEDICARE

## 2018-02-23 VITALS
HEART RATE: 74 BPM | BODY MASS INDEX: 31.77 KG/M2 | HEIGHT: 62 IN | DIASTOLIC BLOOD PRESSURE: 70 MMHG | WEIGHT: 172.63 LBS | SYSTOLIC BLOOD PRESSURE: 113 MMHG | RESPIRATION RATE: 16 BRPM

## 2018-02-23 DIAGNOSIS — K21.9 GASTROESOPHAGEAL REFLUX DISEASE WITHOUT ESOPHAGITIS: ICD-10-CM

## 2018-02-23 DIAGNOSIS — I10 ESSENTIAL HYPERTENSION: ICD-10-CM

## 2018-02-23 DIAGNOSIS — E11.65 TYPE 2 DIABETES MELLITUS WITH HYPERGLYCEMIA, WITHOUT LONG-TERM CURRENT USE OF INSULIN: Primary | ICD-10-CM

## 2018-02-23 DIAGNOSIS — E03.9 HYPOTHYROIDISM, UNSPECIFIED TYPE: ICD-10-CM

## 2018-02-23 DIAGNOSIS — M85.80 OSTEOPENIA, UNSPECIFIED LOCATION: ICD-10-CM

## 2018-02-23 DIAGNOSIS — G43.909 MIGRAINE WITHOUT STATUS MIGRAINOSUS, NOT INTRACTABLE, UNSPECIFIED MIGRAINE TYPE: ICD-10-CM

## 2018-02-23 DIAGNOSIS — E06.9 THYROIDITIS: ICD-10-CM

## 2018-02-23 DIAGNOSIS — E66.9 OBESITY (BMI 30-39.9): ICD-10-CM

## 2018-02-23 DIAGNOSIS — E06.3 HASHIMOTO'S THYROIDITIS: ICD-10-CM

## 2018-02-23 DIAGNOSIS — Z78.0 POSTMENOPAUSAL: ICD-10-CM

## 2018-02-23 DIAGNOSIS — Z91.89 CARDIOVASCULAR EVENT RISK: ICD-10-CM

## 2018-02-23 DIAGNOSIS — G47.33 OSA ON CPAP: ICD-10-CM

## 2018-02-23 PROCEDURE — 1125F AMNT PAIN NOTED PAIN PRSNT: CPT | Mod: S$GLB,,, | Performed by: INTERNAL MEDICINE

## 2018-02-23 PROCEDURE — 3008F BODY MASS INDEX DOCD: CPT | Mod: S$GLB,,, | Performed by: INTERNAL MEDICINE

## 2018-02-23 PROCEDURE — 99999 PR PBB SHADOW E&M-EST. PATIENT-LVL IV: CPT | Mod: PBBFAC,,, | Performed by: INTERNAL MEDICINE

## 2018-02-23 PROCEDURE — 1159F MED LIST DOCD IN RCRD: CPT | Mod: S$GLB,,, | Performed by: INTERNAL MEDICINE

## 2018-02-23 PROCEDURE — 99214 OFFICE O/P EST MOD 30 MIN: CPT | Mod: S$GLB,,, | Performed by: INTERNAL MEDICINE

## 2018-02-23 RX ORDER — METFORMIN HYDROCHLORIDE 500 MG/1
500 TABLET, EXTENDED RELEASE ORAL
Qty: 90 TABLET | Refills: 3 | Status: SHIPPED | OUTPATIENT
Start: 2018-02-23 | End: 2018-03-21 | Stop reason: SDUPTHER
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
## 2018-02-27 ENCOUNTER — HOSPITAL ENCOUNTER (OUTPATIENT)
Dept: RADIOLOGY | Facility: CLINIC | Age: 68
Discharge: HOME | End: 2018-02-27
Attending: INTERNAL MEDICINE
Payer: MEDICARE

## 2018-02-27 DIAGNOSIS — K21.9 GASTROESOPHAGEAL REFLUX DISEASE WITHOUT ESOPHAGITIS: ICD-10-CM

## 2018-02-27 DIAGNOSIS — M85.80 OSTEOPENIA, UNSPECIFIED LOCATION: ICD-10-CM

## 2018-02-27 DIAGNOSIS — Z78.0 POSTMENOPAUSAL: ICD-10-CM

## 2018-02-27 PROCEDURE — 77080 DXA BONE DENSITY AXIAL: CPT | Mod: 26,,, | Performed by: RADIOLOGY

## 2018-02-27 PROCEDURE — 77080 DXA BONE DENSITY AXIAL: CPT | Mod: TC,PO

## 2018-03-09 DIAGNOSIS — E11.65 TYPE 2 DIABETES MELLITUS WITH HYPERGLYCEMIA, WITHOUT LONG-TERM CURRENT USE OF INSULIN: ICD-10-CM

## 2018-03-09 DIAGNOSIS — I10 ESSENTIAL HYPERTENSION: ICD-10-CM

## 2018-03-09 DIAGNOSIS — I10 HYPERTENSION, UNSPECIFIED TYPE: ICD-10-CM

## 2018-03-09 DIAGNOSIS — R12 HEARTBURN: ICD-10-CM

## 2018-03-09 RX ORDER — FUROSEMIDE 20 MG/1
TABLET ORAL
Qty: 90 TABLET | Refills: 0 | Status: SHIPPED | OUTPATIENT
Start: 2018-03-09 | End: 2018-03-19 | Stop reason: SDUPTHER

## 2018-03-09 RX ORDER — LEVOTHYROXINE SODIUM 75 UG/1
TABLET ORAL
Qty: 90 TABLET | Refills: 3 | Status: SHIPPED | OUTPATIENT
Start: 2018-03-09 | End: 2019-02-26 | Stop reason: SDUPTHER

## 2018-03-09 RX ORDER — LISINOPRIL 5 MG/1
5 TABLET ORAL DAILY
Qty: 90 TABLET | Refills: 2 | Status: SHIPPED | OUTPATIENT
Start: 2018-03-09 | End: 2018-03-13 | Stop reason: SDUPTHER

## 2018-03-09 RX ORDER — METOPROLOL SUCCINATE 25 MG/1
TABLET, EXTENDED RELEASE ORAL
Qty: 90 TABLET | Refills: 0 | Status: SHIPPED | OUTPATIENT
Start: 2018-03-09 | End: 2018-03-19 | Stop reason: SDUPTHER

## 2018-03-09 RX ORDER — ESOMEPRAZOLE MAGNESIUM 40 MG/1
CAPSULE, DELAYED RELEASE ORAL
Qty: 90 CAPSULE | Refills: 1 | Status: SHIPPED | OUTPATIENT
Start: 2018-03-09 | End: 2018-10-23 | Stop reason: SDUPTHER

## 2018-03-13 DIAGNOSIS — E11.65 TYPE 2 DIABETES MELLITUS WITH HYPERGLYCEMIA, WITHOUT LONG-TERM CURRENT USE OF INSULIN: ICD-10-CM

## 2018-03-13 DIAGNOSIS — I10 HYPERTENSION, UNSPECIFIED TYPE: ICD-10-CM

## 2018-03-13 RX ORDER — LISINOPRIL 5 MG/1
5 TABLET ORAL DAILY
Qty: 90 TABLET | Refills: 2 | Status: SHIPPED | OUTPATIENT
Start: 2018-03-13 | End: 2018-03-19 | Stop reason: SDUPTHER

## 2018-03-19 DIAGNOSIS — M79.7 FIBROMYALGIA: ICD-10-CM

## 2018-03-19 DIAGNOSIS — I10 HYPERTENSION, UNSPECIFIED TYPE: ICD-10-CM

## 2018-03-19 DIAGNOSIS — E11.65 TYPE 2 DIABETES MELLITUS WITH HYPERGLYCEMIA, WITHOUT LONG-TERM CURRENT USE OF INSULIN: ICD-10-CM

## 2018-03-19 DIAGNOSIS — G89.29 CHRONIC LEFT-SIDED LOW BACK PAIN WITH LEFT-SIDED SCIATICA: ICD-10-CM

## 2018-03-19 DIAGNOSIS — Z91.89 CARDIOVASCULAR EVENT RISK: ICD-10-CM

## 2018-03-19 DIAGNOSIS — M54.42 CHRONIC LEFT-SIDED LOW BACK PAIN WITH LEFT-SIDED SCIATICA: ICD-10-CM

## 2018-03-19 DIAGNOSIS — I10 ESSENTIAL HYPERTENSION: ICD-10-CM

## 2018-03-19 DIAGNOSIS — Z82.49 FAMILY HISTORY OF PREMATURE CAD: ICD-10-CM

## 2018-03-19 DIAGNOSIS — E78.00 HYPERCHOLESTEROLEMIA: ICD-10-CM

## 2018-03-19 DIAGNOSIS — M54.31 SCIATICA OF RIGHT SIDE: ICD-10-CM

## 2018-03-19 RX ORDER — METOPROLOL SUCCINATE 25 MG/1
TABLET, EXTENDED RELEASE ORAL
Qty: 90 TABLET | Refills: 0 | Status: SHIPPED | OUTPATIENT
Start: 2018-03-19 | End: 2018-10-08 | Stop reason: SDUPTHER

## 2018-03-19 RX ORDER — GABAPENTIN 100 MG/1
100 CAPSULE ORAL DAILY
Qty: 30 CAPSULE | Refills: 0 | OUTPATIENT
Start: 2018-03-19

## 2018-03-19 RX ORDER — GABAPENTIN 300 MG/1
CAPSULE ORAL
Qty: 180 CAPSULE | Refills: 0 | Status: SHIPPED | OUTPATIENT
Start: 2018-03-19 | End: 2018-05-24

## 2018-03-19 RX ORDER — FUROSEMIDE 20 MG/1
TABLET ORAL
Qty: 90 TABLET | Refills: 0 | Status: SHIPPED | OUTPATIENT
Start: 2018-03-19 | End: 2018-08-29 | Stop reason: SDUPTHER

## 2018-03-19 RX ORDER — ATORVASTATIN CALCIUM 40 MG/1
TABLET, FILM COATED ORAL
Qty: 90 TABLET | Refills: 2 | Status: SHIPPED | OUTPATIENT
Start: 2018-03-19 | End: 2019-02-26 | Stop reason: SDUPTHER

## 2018-03-19 RX ORDER — LISINOPRIL 5 MG/1
5 TABLET ORAL DAILY
Qty: 90 TABLET | Refills: 2 | Status: SHIPPED | OUTPATIENT
Start: 2018-03-19 | End: 2018-11-19 | Stop reason: SDUPTHER

## 2018-03-19 RX ORDER — DULOXETIN HYDROCHLORIDE 60 MG/1
CAPSULE, DELAYED RELEASE ORAL
Qty: 180 CAPSULE | Refills: 0 | Status: SHIPPED | OUTPATIENT
Start: 2018-03-19 | End: 2018-07-26 | Stop reason: SDUPTHER

## 2018-03-20 DIAGNOSIS — I10 ESSENTIAL HYPERTENSION: ICD-10-CM

## 2018-03-20 RX ORDER — LISINOPRIL 2.5 MG/1
TABLET ORAL
Qty: 90 TABLET | Refills: 3 | OUTPATIENT
Start: 2018-03-20

## 2018-03-21 ENCOUNTER — PATIENT MESSAGE (OUTPATIENT)
Dept: ENDOCRINOLOGY | Facility: CLINIC | Age: 68
End: 2018-03-21

## 2018-03-21 DIAGNOSIS — E11.65 TYPE 2 DIABETES MELLITUS WITH HYPERGLYCEMIA, WITHOUT LONG-TERM CURRENT USE OF INSULIN: ICD-10-CM

## 2018-03-21 NOTE — TELEPHONE ENCOUNTER
RX request is for Lisinopril 2.5 mg and Dr. Mcdaniels sent a rx to mail order on 3/19/18 for lisinopril 5 mg.  Prescription refill request refused.  Thanks.  Melodie

## 2018-03-22 RX ORDER — METFORMIN HYDROCHLORIDE 500 MG/1
500 TABLET, EXTENDED RELEASE ORAL
Qty: 90 TABLET | Refills: 3 | Status: SHIPPED | OUTPATIENT
Start: 2018-03-22 | End: 2018-05-24

## 2018-03-27 DIAGNOSIS — M54.31 SCIATICA OF RIGHT SIDE: ICD-10-CM

## 2018-03-27 DIAGNOSIS — M79.7 FIBROMYALGIA: ICD-10-CM

## 2018-03-27 DIAGNOSIS — M54.42 CHRONIC LEFT-SIDED LOW BACK PAIN WITH LEFT-SIDED SCIATICA: ICD-10-CM

## 2018-03-27 DIAGNOSIS — G89.29 CHRONIC LEFT-SIDED LOW BACK PAIN WITH LEFT-SIDED SCIATICA: ICD-10-CM

## 2018-03-29 ENCOUNTER — OFFICE VISIT (OUTPATIENT)
Dept: PAIN MEDICINE | Facility: CLINIC | Age: 68
End: 2018-03-29
Payer: MEDICARE

## 2018-03-29 VITALS
WEIGHT: 172 LBS | HEIGHT: 62 IN | BODY MASS INDEX: 31.65 KG/M2 | SYSTOLIC BLOOD PRESSURE: 123 MMHG | HEART RATE: 69 BPM | DIASTOLIC BLOOD PRESSURE: 78 MMHG

## 2018-03-29 DIAGNOSIS — M54.31 SCIATICA OF RIGHT SIDE: ICD-10-CM

## 2018-03-29 DIAGNOSIS — M51.36 DDD (DEGENERATIVE DISC DISEASE), LUMBAR: ICD-10-CM

## 2018-03-29 DIAGNOSIS — M79.7 FIBROMYALGIA: ICD-10-CM

## 2018-03-29 DIAGNOSIS — M79.18 MYOFASCIAL PAIN: ICD-10-CM

## 2018-03-29 DIAGNOSIS — M54.16 LUMBAR RADICULITIS: ICD-10-CM

## 2018-03-29 DIAGNOSIS — M54.42 CHRONIC LEFT-SIDED LOW BACK PAIN WITH LEFT-SIDED SCIATICA: ICD-10-CM

## 2018-03-29 DIAGNOSIS — M47.896 OTHER SPONDYLOSIS, LUMBAR REGION: Primary | ICD-10-CM

## 2018-03-29 DIAGNOSIS — G89.29 CHRONIC LEFT-SIDED LOW BACK PAIN WITH LEFT-SIDED SCIATICA: ICD-10-CM

## 2018-03-29 PROCEDURE — 99214 OFFICE O/P EST MOD 30 MIN: CPT | Mod: S$GLB,,, | Performed by: PHYSICIAN ASSISTANT

## 2018-03-29 PROCEDURE — 3074F SYST BP LT 130 MM HG: CPT | Mod: CPTII,S$GLB,, | Performed by: PHYSICIAN ASSISTANT

## 2018-03-29 PROCEDURE — 3078F DIAST BP <80 MM HG: CPT | Mod: CPTII,S$GLB,, | Performed by: PHYSICIAN ASSISTANT

## 2018-03-29 PROCEDURE — 99999 PR PBB SHADOW E&M-EST. PATIENT-LVL IV: CPT | Mod: PBBFAC,,, | Performed by: PHYSICIAN ASSISTANT

## 2018-03-29 RX ORDER — GABAPENTIN 100 MG/1
CAPSULE ORAL
Qty: 90 CAPSULE | Refills: 1 | Status: SHIPPED | OUTPATIENT
Start: 2018-03-29 | End: 2018-07-31 | Stop reason: SDUPTHER

## 2018-03-29 RX ORDER — ISOPROPYL ALCOHOL 0.75 G/1
SWAB TOPICAL
COMMUNITY
Start: 2018-03-19 | End: 2018-12-27

## 2018-03-29 RX ORDER — GABAPENTIN 100 MG/1
100 CAPSULE ORAL DAILY
Qty: 30 CAPSULE | Refills: 0 | Status: SHIPPED | OUTPATIENT
Start: 2018-03-29 | End: 2018-03-29
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
## 2018-04-24 ENCOUNTER — OFFICE VISIT (OUTPATIENT)
Dept: OBSTETRICS AND GYNECOLOGY | Facility: CLINIC | Age: 68
End: 2018-04-24
Payer: MEDICARE

## 2018-04-24 VITALS
WEIGHT: 172 LBS | DIASTOLIC BLOOD PRESSURE: 80 MMHG | HEIGHT: 62 IN | BODY MASS INDEX: 31.65 KG/M2 | SYSTOLIC BLOOD PRESSURE: 116 MMHG

## 2018-04-24 DIAGNOSIS — R30.9 PAINFUL URINATION: Primary | ICD-10-CM

## 2018-04-24 DIAGNOSIS — R10.2 PELVIC PAIN IN FEMALE: ICD-10-CM

## 2018-04-24 PROCEDURE — 99999 PR PBB SHADOW E&M-EST. PATIENT-LVL III: CPT | Mod: PBBFAC,,, | Performed by: OBSTETRICS & GYNECOLOGY

## 2018-04-24 PROCEDURE — 3074F SYST BP LT 130 MM HG: CPT | Mod: CPTII,S$GLB,, | Performed by: OBSTETRICS & GYNECOLOGY

## 2018-04-24 PROCEDURE — 99213 OFFICE O/P EST LOW 20 MIN: CPT | Mod: S$GLB,,, | Performed by: OBSTETRICS & GYNECOLOGY

## 2018-04-24 PROCEDURE — 3079F DIAST BP 80-89 MM HG: CPT | Mod: CPTII,S$GLB,, | Performed by: OBSTETRICS & GYNECOLOGY

## 2018-04-24 PROCEDURE — 87086 URINE CULTURE/COLONY COUNT: CPT

## 2018-04-24 RX ORDER — NITROFURANTOIN 25; 75 MG/1; MG/1
CAPSULE ORAL
COMMUNITY
Start: 2018-04-21 | End: 2018-08-02

## 2018-04-24 RX ORDER — LIRAGLUTIDE 6 MG/ML
INJECTION SUBCUTANEOUS
Refills: 0 | COMMUNITY
Start: 2018-03-12 | End: 2018-05-24

## 2018-04-24 NOTE — PROGRESS NOTES
Subjective:       Patient ID: Franca Coffey is a 68 y.o. female.    Chief Complaint:  Urinary Tract Infection      History of Present Illness  HPI  68 y.o.  Ab1 with complaint of recurring pressure feeling in pelvis. Patient also reports going to urgent care center 3 days ago and getting Macrobid for a suspected UTI. Patient feels slightly better today but desires that the urine be sent for culture. Patient also concerned about mid urethral sling.    GYN & OB History  No LMP recorded. Patient has had a hysterectomy.   Date of Last Pap: 2013    OB History    Para Term  AB Living   3 2 2   1 2   SAB TAB Ectopic Multiple Live Births   1              # Outcome Date GA Lbr Roderick/2nd Weight Sex Delivery Anes PTL Lv   3 SAB            2 Term            1 Term                   Review of Systems  Review of Systems   Constitutional: Negative for activity change, appetite change, chills, diaphoresis, fatigue, fever and unexpected weight change.   HENT: Negative for mouth sores and tinnitus.    Eyes: Negative for discharge and visual disturbance.   Respiratory: Negative for cough, shortness of breath and wheezing.    Cardiovascular: Negative for chest pain, palpitations and leg swelling.   Gastrointestinal: Negative for abdominal pain, bloating, blood in stool, constipation, diarrhea, nausea and vomiting.   Endocrine: Positive for hypothyroidism. Negative for diabetes, hair loss, hot flashes and hyperthyroidism.   Genitourinary: Positive for pelvic pain. Negative for decreased libido, dyspareunia, dysuria, flank pain, frequency, genital sores, hematuria, menorrhagia, menstrual problem, urgency, vaginal bleeding, vaginal discharge, vaginal pain, dysmenorrhea, urinary incontinence, postcoital bleeding, postmenopausal bleeding and vaginal odor.   Musculoskeletal: Negative for back pain, joint swelling and myalgias.   Skin:  Negative for rash, no acne and hair changes.   Neurological: Negative  for seizures, syncope, numbness and headaches.   Hematological: Negative for adenopathy. Does not bruise/bleed easily.   Psychiatric/Behavioral: Positive for depression. Negative for sleep disturbance. The patient is nervous/anxious.    Breast: Negative for breast mass, breast pain, nipple discharge and skin changes          Objective:    Physical Exam:   Constitutional: She is oriented to person, place, and time. She appears well-developed and well-nourished. No distress.    HENT:   Head: Normocephalic.    Eyes: Pupils are equal, round, and reactive to light.    Neck: Normal range of motion.    Cardiovascular: Normal rate.     Pulmonary/Chest: Effort normal.        Abdominal: Soft. She exhibits no distension. There is no tenderness.     Genitourinary: Vagina normal. No vaginal discharge found.   Genitourinary Comments: No vaginal lesions noted. No palpable pelvic masses or tenderness.           Musculoskeletal: Normal range of motion.       Neurological: She is alert and oriented to person, place, and time.    Skin: Skin is warm and dry.    Psychiatric: Judgment normal.          Assessment:        1. Painful urination    2. Pelvic pain in female                Plan:      Urine C&S

## 2018-04-25 LAB — BACTERIA UR CULT: NO GROWTH

## 2018-05-09 ENCOUNTER — TELEPHONE (OUTPATIENT)
Dept: GASTROENTEROLOGY | Facility: CLINIC | Age: 68
End: 2018-05-09

## 2018-05-09 DIAGNOSIS — R19.7 DIARRHEA, UNSPECIFIED TYPE: Primary | ICD-10-CM

## 2018-05-09 NOTE — TELEPHONE ENCOUNTER
----- Message from Kerri Lantigua sent at 5/9/2018  2:24 PM CDT -----  Type: Needs Medical Advice    Who Called:   Pt Symptoms (please be specific):  Diarrhea How long has patient had these symptoms:2 days  Pharm[facility] name and phone #:[email or web address]  Pt  Does not  Know the #  Best Call Back Number: [phone]  Additional Information:  Calling  To  Speak to the  Nurse  About the severe  diarrhea

## 2018-05-09 NOTE — TELEPHONE ENCOUNTER
Patient had bladder infection and was given antibiotics had to stop after 3 days , had diarrhea that was 2 weeks ago and now for 2 days has had severe diarrhea unable to eat or drink without having diarrhea, tried imodium not helping at all.

## 2018-05-13 ENCOUNTER — PATIENT MESSAGE (OUTPATIENT)
Dept: GASTROENTEROLOGY | Facility: CLINIC | Age: 68
End: 2018-05-13

## 2018-05-14 ENCOUNTER — PATIENT MESSAGE (OUTPATIENT)
Dept: GASTROENTEROLOGY | Facility: CLINIC | Age: 68
End: 2018-05-14

## 2018-05-24 ENCOUNTER — TELEPHONE (OUTPATIENT)
Dept: RHEUMATOLOGY | Facility: CLINIC | Age: 68
End: 2018-05-24

## 2018-05-24 ENCOUNTER — OFFICE VISIT (OUTPATIENT)
Dept: RHEUMATOLOGY | Facility: CLINIC | Age: 68
End: 2018-05-24
Payer: MEDICARE

## 2018-05-24 VITALS
HEIGHT: 64 IN | DIASTOLIC BLOOD PRESSURE: 82 MMHG | WEIGHT: 179.25 LBS | HEART RATE: 72 BPM | SYSTOLIC BLOOD PRESSURE: 122 MMHG | BODY MASS INDEX: 30.6 KG/M2

## 2018-05-24 DIAGNOSIS — R45.89 DYSPHORIC MOOD: ICD-10-CM

## 2018-05-24 DIAGNOSIS — M79.7 FIBROMYALGIA: Primary | ICD-10-CM

## 2018-05-24 PROCEDURE — 99213 OFFICE O/P EST LOW 20 MIN: CPT | Mod: S$GLB,,, | Performed by: INTERNAL MEDICINE

## 2018-05-24 PROCEDURE — 3074F SYST BP LT 130 MM HG: CPT | Mod: CPTII,S$GLB,, | Performed by: INTERNAL MEDICINE

## 2018-05-24 PROCEDURE — 99999 PR PBB SHADOW E&M-EST. PATIENT-LVL III: CPT | Mod: PBBFAC,,, | Performed by: INTERNAL MEDICINE

## 2018-05-24 PROCEDURE — 3079F DIAST BP 80-89 MM HG: CPT | Mod: CPTII,S$GLB,, | Performed by: INTERNAL MEDICINE

## 2018-05-24 RX ORDER — GABAPENTIN 400 MG/1
400 CAPSULE ORAL NIGHTLY
Qty: 90 CAPSULE | Refills: 1 | Status: SHIPPED | OUTPATIENT
Start: 2018-05-24 | End: 2018-11-20 | Stop reason: SDUPTHER

## 2018-05-24 ASSESSMENT — ROUTINE ASSESSMENT OF PATIENT INDEX DATA (RAPID3)
PSYCHOLOGICAL DISTRESS SCORE: 9.9
MDHAQ FUNCTION SCORE: 1.1
PATIENT GLOBAL ASSESSMENT SCORE: 2.5
TOTAL RAPID3 SCORE: 4.72
FATIGUE SCORE: 6
PAIN SCORE: 8
AM STIFFNESS SCORE: 1, YES
WHEN YOU AWAKENED IN THE MORNING OVER THE LAST WEEK, PLEASE INDICATE THE AMOUNT OF TIME IT TAKES UNTIL YOU ARE AS LIMBER AS YOU WILL BE FOR THE DAY: 2 HOURS

## 2018-05-24 NOTE — PROGRESS NOTES
"Subjective:       Patient ID: Franca Coffey is a 68 y.o. female.    Chief Complaint: Fibromyalgia  HPI 68-year-old female with multiple comorbidities including diabetes, Hashimoto's thyroiditis, this metabolic syndrome, REYES, hypertension is here for follow up for Fibromyalgia. Currently, on gabapentin and Cymbalta   Today, she grades her pain as 78 out of 10. Pain is aching type, worse in the evening, worse with activity, improves with  NSAIDs.  No joint swelling       Review of Systems   HENT: Negative for ear discharge and ear pain.    Eyes: Negative for pain and redness.   Respiratory: Negative for cough and shortness of breath.    Cardiovascular: Negative for chest pain and palpitations.   Gastrointestinal: Negative for abdominal distention and abdominal pain.   Genitourinary: Negative for genital sores and hematuria.   Musculoskeletal: Positive for arthralgias.   Psychiatric/Behavioral: Positive for dysphoric mood. Negative for self-injury.         Objective:   /82 (BP Location: Right arm, Patient Position: Sitting)   Pulse 72   Ht 5' 4" (1.626 m)   Wt 81.3 kg (179 lb 3.7 oz)   BMI 30.77 kg/m²      Physical Exam   Nursing note and vitals reviewed.  Constitutional: She is oriented to person, place, and time and well-developed, well-nourished, and in no distress.   HENT:   Head: Normocephalic and atraumatic.   Eyes: Conjunctivae and EOM are normal. Pupils are equal, round, and reactive to light.   Neck: Normal range of motion. Neck supple. No thyromegaly present.   Cardiovascular: Normal rate and regular rhythm.  Exam reveals no friction rub.    Pulmonary/Chest: Effort normal and breath sounds normal.   Abdominal: Soft. Bowel sounds are normal.   Neurological: She is alert and oriented to person, place, and time.   Skin: Skin is warm and dry.     Psychiatric: Memory and affect normal. She exhibits a depressed mood. She expresses no homicidal and no suicidal ideation. She expresses no suicidal " plans and no homicidal plans.   Musculoskeletal: She exhibits no edema.   No joint swelling         Lab Results   Component Value Date    WBC 5.76 08/24/2017    HGB 12.6 08/24/2017    HCT 37.9 08/24/2017    MCV 90 08/24/2017     08/24/2017     CMP  Sodium   Date Value Ref Range Status   02/27/2018 143 136 - 145 mmol/L Final     Potassium   Date Value Ref Range Status   02/27/2018 4.0 3.5 - 5.1 mmol/L Final     Chloride   Date Value Ref Range Status   02/27/2018 106 95 - 110 mmol/L Final     CO2   Date Value Ref Range Status   02/27/2018 31 (H) 23 - 29 mmol/L Final     Glucose   Date Value Ref Range Status   02/27/2018 106 70 - 110 mg/dL Final     BUN, Bld   Date Value Ref Range Status   02/27/2018 16 8 - 23 mg/dL Final     Creatinine   Date Value Ref Range Status   02/27/2018 0.9 0.5 - 1.4 mg/dL Final     Calcium   Date Value Ref Range Status   02/27/2018 9.3 8.7 - 10.5 mg/dL Final     Total Protein   Date Value Ref Range Status   02/27/2018 6.8 6.0 - 8.4 g/dL Final     Albumin   Date Value Ref Range Status   02/27/2018 3.4 (L) 3.5 - 5.2 g/dL Final     Total Bilirubin   Date Value Ref Range Status   02/27/2018 0.3 0.1 - 1.0 mg/dL Final     Comment:     For infants and newborns, interpretation of results should be based  on gestational age, weight and in agreement with clinical  observations.  Premature Infant recommended reference ranges:  Up to 24 hours.............<8.0 mg/dL  Up to 48 hours............<12.0 mg/dL  3-5 days..................<15.0 mg/dL  6-29 days.................<15.0 mg/dL       Alkaline Phosphatase   Date Value Ref Range Status   02/27/2018 88 55 - 135 U/L Final     AST   Date Value Ref Range Status   02/27/2018 16 10 - 40 U/L Final     ALT   Date Value Ref Range Status   02/27/2018 17 10 - 44 U/L Final     Anion Gap   Date Value Ref Range Status   02/27/2018 6 (L) 8 - 16 mmol/L Final     eGFR if    Date Value Ref Range Status   02/27/2018 >60.0 >60 mL/min/1.73 m^2 Final      eGFR if non    Date Value Ref Range Status   02/27/2018 >60.0 >60 mL/min/1.73 m^2 Final     Comment:     Calculation used to obtain the estimated glomerular filtration  rate (eGFR) is the CKD-EPI equation.        Lab Results   Component Value Date    SEDRATE 11 08/24/2017     Lab Results   Component Value Date    CRP 0.6 08/24/2017      Results for GEOFFREY CAICEDO (MRN [id number]) as of 3/28/2017 14:11   Ref. Range 4/2/2012 08:34 8/12/2014 10:41   NOHEMY Latest Ref Range: Neg <1:160  Pos, Titer to follow (A)    NOHEMY HEP-2 Titer Latest Ref Range: Neg <1:160 titer Pos 1:320 (A)    NOHEMY Hep-2 Pattern Unknown Nucleolar (A)    Anti-SSA Antibody Latest Ref Range: <20 EU 0.70    Anti-SSA Interpretation Latest Ref Range: Negative  Negative    Anti-SSB Antibody Latest Ref Range: <20 EU 1.47    Anti-SSB Interpretation Latest Ref Range: Negative  Negative    ds DNA Ab Latest Ref Range: Negative Titer Negative    Anti Sm Antibody Latest Ref Range: <20 EU 1.15    Anti-Sm Interpretation Latest Ref Range: Negative  Negative    Anti Sm/RNP Antibody Latest Ref Range: <20 EU 2.87    Anti-Sm/RNP Interpretation Latest Ref Range: Negative  Negative    TTG IgA Latest Ref Range: <20 UNITS  14   IgA Latest Ref Range: 40 - 350 mg/dL  179   CCP Antibodies Latest Ref Range: <5.0 U/mL <1.0    Rheumatoid Factor Latest Ref Range: 0 - 15 IU/ml 21 (H)        X ray of right hand in Aug 2016- ? periarticular osteopenia    DEXA 2015- Osteopenia -- there is a 7.6% risk of a major osteoporotic fracture and a 0.9% risk of hip fracture in the next 10 years (FRAX).    Assessment:   Fibromyalgia D  dysphoric mood/depression-psychiatry referral  DDD with left-sided   Generalized osteoarthritis  Long-term NSAID use  Plan:   Continue gabapentin to 100 mg in morning,  And 400 mg at bedtime.  Continue Cymbalta   naproxen 500 mg twice a day with meals as needed   Psychiatry evaluation for depression   Counseled to exercise as tolerated    Return to clinic in 6 months

## 2018-05-24 NOTE — TELEPHONE ENCOUNTER
----- Message from Franca Giang sent at 5/24/2018  8:10 AM CDT -----  Call pt at [phone] ... Asking if she can come in at 10 am today

## 2018-06-19 DIAGNOSIS — E11.9 TYPE 2 DIABETES MELLITUS WITHOUT COMPLICATION, WITHOUT LONG-TERM CURRENT USE OF INSULIN: Primary | ICD-10-CM

## 2018-06-21 ENCOUNTER — OFFICE VISIT (OUTPATIENT)
Dept: FAMILY MEDICINE | Facility: CLINIC | Age: 68
End: 2018-06-21
Attending: FAMILY MEDICINE
Payer: MEDICARE

## 2018-06-21 VITALS
DIASTOLIC BLOOD PRESSURE: 78 MMHG | OXYGEN SATURATION: 96 % | SYSTOLIC BLOOD PRESSURE: 148 MMHG | BODY MASS INDEX: 30.41 KG/M2 | TEMPERATURE: 99 F | WEIGHT: 178.13 LBS | HEIGHT: 64 IN | RESPIRATION RATE: 17 BRPM | HEART RATE: 77 BPM

## 2018-06-21 DIAGNOSIS — M70.62 TROCHANTERIC BURSITIS, LEFT HIP: ICD-10-CM

## 2018-06-21 DIAGNOSIS — Z00.00 ENCOUNTER FOR PREVENTIVE HEALTH EXAMINATION: Primary | ICD-10-CM

## 2018-06-21 DIAGNOSIS — M47.816 LUMBAR SPONDYLOSIS: ICD-10-CM

## 2018-06-21 DIAGNOSIS — T84.84XD PAIN DUE TO TOTAL LEFT KNEE REPLACEMENT, SUBSEQUENT ENCOUNTER: ICD-10-CM

## 2018-06-21 DIAGNOSIS — J01.40 ACUTE NON-RECURRENT PANSINUSITIS: ICD-10-CM

## 2018-06-21 DIAGNOSIS — I10 GOOD HYPERTENSION CONTROL: ICD-10-CM

## 2018-06-21 DIAGNOSIS — F41.9 ANXIETY: ICD-10-CM

## 2018-06-21 DIAGNOSIS — E03.9 HYPOTHYROIDISM, UNSPECIFIED TYPE: ICD-10-CM

## 2018-06-21 DIAGNOSIS — F32.1 CURRENT MODERATE EPISODE OF MAJOR DEPRESSIVE DISORDER WITHOUT PRIOR EPISODE: ICD-10-CM

## 2018-06-21 DIAGNOSIS — Z96.652 PAIN DUE TO TOTAL LEFT KNEE REPLACEMENT, SUBSEQUENT ENCOUNTER: ICD-10-CM

## 2018-06-21 DIAGNOSIS — M51.36 DDD (DEGENERATIVE DISC DISEASE), LUMBAR: ICD-10-CM

## 2018-06-21 PROCEDURE — 99397 PER PM REEVAL EST PAT 65+ YR: CPT | Mod: S$GLB,,, | Performed by: FAMILY MEDICINE

## 2018-06-21 PROCEDURE — 99999 PR PBB SHADOW E&M-EST. PATIENT-LVL IV: CPT | Mod: PBBFAC,,, | Performed by: FAMILY MEDICINE

## 2018-06-21 PROCEDURE — 3077F SYST BP >= 140 MM HG: CPT | Mod: CPTII,S$GLB,, | Performed by: FAMILY MEDICINE

## 2018-06-21 PROCEDURE — 3078F DIAST BP <80 MM HG: CPT | Mod: CPTII,S$GLB,, | Performed by: FAMILY MEDICINE

## 2018-06-21 RX ORDER — DOXYCYCLINE 100 MG/1
100 CAPSULE ORAL EVERY 12 HOURS
Qty: 20 CAPSULE | Refills: 0 | Status: SHIPPED | OUTPATIENT
Start: 2018-06-21 | End: 2018-08-02 | Stop reason: ALTCHOICE

## 2018-06-21 RX ORDER — BUPROPION HYDROCHLORIDE 150 MG/1
150 TABLET ORAL DAILY
Qty: 30 TABLET | Refills: 5 | Status: SHIPPED | OUTPATIENT
Start: 2018-06-21 | End: 2018-08-09 | Stop reason: SDUPTHER

## 2018-06-21 NOTE — PROGRESS NOTES
"Subjective:       Patient ID: Franca Coffey is a 68 y.o. female.    Chief Complaint: Annual Exam; Depression; and Leg Pain    68-year-old female coming in for a physical exam with a host of problems.  She has a history of left knee replacement with ongoing pain and failure of the knee replacement.  2 years ago she had a geniculate ganglion radiofrequency ablation which gave her a few weeks relief but it rapidly returned and she did not go back for further evaluation.  She subsequently has sprained her left ankle about 4 weeks ago and has problems now in the left hip, left knee, and left ankle.  She's also feeling very depressed and not suicidal but she thinks she needs "happy pill".  She's also complaining of facial pain and pressure ongoing sinus headaches that a been going on for at least 3 weeks.  She has purulent sputum and low-grade fever.  History includes hypertension, hyperlipidemia, diabetes, anxiety and depression, hypothyroidism, sleep apnea, renal looks, irritable bowel syndrome, kidney stones, and mean years, and osteoarthritis.  Dr. Fernandes is suggesting replacement of the right knee but she is reluctant to do that until the left side is usable.    Past Medical History:  No date: Anxiety  No date: Cataract  No date: Depression  No date: Diabetes mellitus      Comment: oral meds  No date: Dizzy  No date: Fibromyalgia  No date: GERD (gastroesophageal reflux disease)  No date: History of bladder infections  No date: Hypertension  No date: Hypothyroid  No date: IBS (irritable bowel syndrome)  No date: Kidney stones  No date: Meniere disease  No date: Migraine  No date: Muscle spasms of head and/or neck      Comment: and lower ext  No date: Osteoarthritis  No date: PONV (postoperative nausea and vomiting)      Comment: severe-cause by morphine per pt  No date: Sleep apnea      Comment: CPAP    Past Surgical History:  No date: BLADDER SUSPENSION  No date: BREAST BIOPSY  8/27/14: COLONOSCOPY      " Comment: Dr. Thomas, 5 year recheck  No date: dental implant      Comment: upper RT implant  No date: EYE SURGERY      Comment: bilateral PHACO with IOL  12/05/2008: FOOT SURGERY Bilateral      Comment: bunionectomy  No date: FRACTURE SURGERY  No date: HYSTERECTOMY  No date: JOINT REPLACEMENT Left      Comment: Partial knee  No date: JOINT REPLACEMENT Left      Comment: Total knee replacement  No date: KNEE ARTHROSCOPY Bilateral  No date: MANDIBLE FRACTURE SURGERY  No date: MULTIPLE TOOTH EXTRACTIONS  No date: revision of mesh      Comment: repair to bladder suspension  No date: TONSILLECTOMY, ADENOIDECTOMY  No date: UPPER GASTROINTESTINAL ENDOSCOPY  No date: VAGINAL DELIVERY      Comment: times 2    Review of patient's family history indicates:  Problem: Diabetes      Relation: Mother       Age of Onset: (Not Specified)   Problem: Heart disease      Relation: Mother       Age of Onset: (Not Specified)   Problem: Kidney disease      Relation: Mother       Age of Onset: (Not Specified)   Problem: Hypertension      Relation: Mother       Age of Onset: (Not Specified)   Problem: Hyperlipidemia      Relation: Mother       Age of Onset: (Not Specified)   Problem: Diabetes Mellitus      Relation: Mother       Age of Onset: (Not Specified)   Problem: Heart disease      Relation: Father       Age of Onset: (Not Specified)   Problem: Diabetes      Relation: Father       Age of Onset: (Not Specified)   Problem: Hypertension      Relation: Father       Age of Onset: (Not Specified)   Problem: Hyperlipidemia      Relation: Father       Age of Onset: (Not Specified)   Problem: Diabetes Mellitus      Relation: Father       Age of Onset: (Not Specified)   Problem: COPD      Relation: Father       Age of Onset: (Not Specified)   Problem: Heart disease      Relation: Brother       Age of Onset: (Not Specified)   Problem: Cancer      Relation: Brother       Age of Onset: (Not Specified)   Problem: Early death      Relation: Brother        Age of Onset: (Not Specified)   Problem: Stomach cancer      Relation: Brother       Age of Onset: (Not Specified)   Problem: Breast cancer      Relation: Maternal Aunt       Age of Onset: (Not Specified)   Problem: Heart disease      Relation: Brother       Age of Onset: (Not Specified)   Problem: Ovarian cancer      Relation: Neg Hx       Age of Onset: (Not Specified)   Problem: Cataracts      Relation: Neg Hx       Age of Onset: (Not Specified)   Problem: Glaucoma      Relation: Neg Hx       Age of Onset: (Not Specified)   Problem: Macular degeneration      Relation: Neg Hx       Age of Onset: (Not Specified)   Problem: Retinal detachment      Relation: Neg Hx       Age of Onset: (Not Specified)   Problem: Thyroid disease      Relation: Neg Hx       Age of Onset: (Not Specified)   Problem: Stroke      Relation: Neg Hx       Age of Onset: (Not Specified)   Problem: Rheum arthritis      Relation: Neg Hx       Age of Onset: (Not Specified)   Problem: Psoriasis      Relation: Neg Hx       Age of Onset: (Not Specified)   Problem: Osteoarthritis      Relation: Neg Hx       Age of Onset: (Not Specified)   Problem: Lupus      Relation: Neg Hx       Age of Onset: (Not Specified)   Problem: Inflammatory bowel disease      Relation: Neg Hx       Age of Onset: (Not Specified)   Problem: Depression      Relation: Neg Hx       Age of Onset: (Not Specified)   Problem: Chronic back pain      Relation: Neg Hx       Age of Onset: (Not Specified)   Problem: Asthma      Relation: Neg Hx       Age of Onset: (Not Specified)     Social History    Marital status:              Spouse name:                       Years of education:                 Number of children:               Social History Main Topics    Smoking status: Former Smoker                                                                Packs/day: 0.25      Years: 1.00           Quit date: 10/15/1966    Smokeless tobacco: Never Used                         Alcohol use: Yes                Comment: occasionally    Drug use: No              Sexual activity: Not Currently     Partners with: Male       Birth control/protection: Post-menopausal, Surgical      Current Outpatient Prescriptions on File Prior to Visit:  atorvastatin (LIPITOR) 40 MG tablet, TAKE 1 TABLET EVERY DAY, Disp: 90 tablet, Rfl: 2  BD ALCOHOL SWABS PadM, , Disp: , Rfl:   chlordiazepoxide-clidinium 5-2.5 mg (LIBRAX) 5-2.5 mg Cap, Take by mouth. 2 qam, 1 q noon, 1 qhs, Disp: , Rfl: 0  DULoxetine (CYMBALTA) 60 MG capsule, TAKE 1 CAPSULE TWICE DAILY, Disp: 180 capsule, Rfl: 0  esomeprazole (NEXIUM) 40 MG capsule, TAKE 1 CAPSULE EVERY DAY, Disp: 90 capsule, Rfl: 1  furosemide (LASIX) 20 MG tablet, TAKE 1 TABLET EVERY DAY, Disp: 90 tablet, Rfl: 0  gabapentin (NEURONTIN) 100 MG capsule, take 1 capsule by mouth once daily, Disp: 90 capsule, Rfl: 1  gabapentin (NEURONTIN) 400 MG capsule, Take 1 capsule (400 mg total) by mouth every evening., Disp: 90 capsule, Rfl: 1  levothyroxine (SYNTHROID) 75 MCG tablet, TAKE 1 TABLET DAILY BEFORE BREAKFAST, Disp: 90 tablet, Rfl: 3  lisinopril (PRINIVIL,ZESTRIL) 5 MG tablet, Take 1 tablet (5 mg total) by mouth once daily., Disp: 90 tablet, Rfl: 2  metoprolol succinate (TOPROL-XL) 25 MG 24 hr tablet, TAKE 1 TABLET EVERY DAY, Disp: 90 tablet, Rfl: 0  topiramate (TOPAMAX) 50 MG tablet, TAKE 1 TABLET ONE TIME DAILY, Disp: 90 tablet, Rfl: 3  TRUE METRIX AIR GLUCOSE METER kit, , Disp: , Rfl:   TRUE METRIX GLUCOSE TEST STRIP Strp, , Disp: , Rfl:   TRUE METRIX LEVEL 1 Soln, , Disp: , Rfl:   TRUEPLUS LANCETS 28 gauge Misc, , Disp: , Rfl:   nitrofurantoin, macrocrystal-monohydrate, (MACROBID) 100 MG capsule, , Disp: , Rfl:     No current facility-administered medications on file prior to visit.     Zoster Vaccine due on 01/29/2008  Eye Exam due on 09/30/2017  Pneumococcal (65+)(2 of 2 - PPSV23) due on 01/26/2018  Foot Exam due on 03/28/2018        Review of Systems   Constitutional:  Positive for fever. Negative for chills and diaphoresis.   HENT: Positive for congestion, sinus pain and sinus pressure.    Eyes: Negative for visual disturbance.   Respiratory: Positive for cough. Negative for chest tightness and shortness of breath.    Cardiovascular: Negative for chest pain, palpitations and leg swelling.   Gastrointestinal: Negative for abdominal distention, abdominal pain, blood in stool, constipation, diarrhea, nausea and vomiting.   Musculoskeletal: Positive for arthralgias, back pain, gait problem and myalgias. Negative for joint swelling.   Skin: Negative for color change and rash.   Neurological: Negative for dizziness, light-headedness and headaches.   Psychiatric/Behavioral: Positive for dysphoric mood. The patient is nervous/anxious.        Objective:      Physical Exam   Constitutional: She is oriented to person, place, and time. She appears well-developed. No distress.   Elevated blood pressure but patient is in pain  Obese with a BMI of 30.6 weight is up 6.6 pounds from her last visit with me April 26, 2017   HENT:   Head: Normocephalic and atraumatic.   Right Ear: Hearing, tympanic membrane, external ear and ear canal normal.   Left Ear: Hearing, tympanic membrane, external ear and ear canal normal.   Nose: Mucosal edema and rhinorrhea present. Right sinus exhibits frontal sinus tenderness. Right sinus exhibits no maxillary sinus tenderness. Left sinus exhibits maxillary sinus tenderness and frontal sinus tenderness.   Mouth/Throat: Oropharynx is clear and moist. No oropharyngeal exudate, posterior oropharyngeal edema, posterior oropharyngeal erythema or tonsillar abscesses.   Eyes: Conjunctivae are normal. Pupils are equal, round, and reactive to light. Right eye exhibits no discharge. Left eye exhibits no discharge. No scleral icterus.   Neck: Normal range of motion. Neck supple. No JVD present. No tracheal deviation present. No thyromegaly present.   Cardiovascular: Normal rate,  regular rhythm and normal heart sounds.  Exam reveals no gallop and no friction rub.    No murmur heard.  Pulses:       Dorsalis pedis pulses are 2+ on the right side, and 2+ on the left side.        Posterior tibial pulses are 2+ on the right side, and 2+ on the left side.   Carotid pulses 2+ no bruit   Pulmonary/Chest: Effort normal and breath sounds normal. No respiratory distress. She has no wheezes. She has no rales. She exhibits no tenderness.   Abdominal: Soft. Bowel sounds are normal. She exhibits no distension and no mass. There is no tenderness. There is no rebound and no guarding.   Musculoskeletal: She exhibits no edema.        Left hip: She exhibits decreased range of motion and bony tenderness (greater trochanter).        Left knee: She exhibits decreased range of motion. She exhibits no swelling and no effusion. Tenderness found.        Right foot: There is normal range of motion and no deformity.        Left foot: There is normal range of motion and no deformity.   Negative straight leg raise bilaterally   Feet:   Right Foot:   Protective Sensation: 8 sites tested. 8 sites sensed.   Skin Integrity: Negative for ulcer, blister, skin breakdown, erythema, warmth, callus or dry skin.   Left Foot:   Protective Sensation: 8 sites tested. 8 sites sensed.   Skin Integrity: Negative for ulcer, blister, skin breakdown, erythema, warmth, callus or dry skin.   Lymphadenopathy:     She has cervical adenopathy.   Neurological: She is alert and oriented to person, place, and time. She has normal reflexes. She displays normal reflexes. No cranial nerve deficit or sensory deficit. She exhibits normal muscle tone.   Proprioception intact to all 10 toes   Skin: Skin is warm and dry. Capillary refill takes less than 2 seconds. No rash noted. She is not diaphoretic. No erythema.   Psychiatric: Her speech is normal. Judgment and thought content normal. Her mood appears anxious. Her affect is angry and labile. She is  slowed. Cognition and memory are normal. She exhibits a depressed mood. She is attentive.   Nursing note and vitals reviewed.      Assessment:       1. Encounter for preventive health examination    2. Acute non-recurrent pansinusitis    3. Pain due to total left knee replacement, subsequent encounter    4. Trochanteric bursitis, left hip    5. DDD (degenerative disc disease), lumbar    6. Lumbar spondylosis    7. Anxiety    8. Current moderate episode of major depressive disorder without prior episode    9. Good hypertension control    10. Hypothyroidism, unspecified type    11. BMI 30.0-30.9,adult        Plan:       1. Encounter for preventive health examination    2. Acute non-recurrent pansinusitis  - doxycycline (VIBRAMYCIN) 100 MG Cap; Take 1 capsule (100 mg total) by mouth every 12 (twelve) hours.  Dispense: 20 capsule; Refill: 0    3. Pain due to total left knee replacement, subsequent encounter  - Ambulatory referral to Physical Medicine Rehab    4. Trochanteric bursitis, left hip  - Ambulatory referral to Physical Medicine Rehab    5. DDD (degenerative disc disease), lumbar  Followed by Dr. Rosas    6. Lumbar spondylosis  Followed by Dr. Rosas    7. Anxiety    8. Current moderate episode of major depressive disorder without prior episode  - buPROPion (WELLBUTRIN XL) 150 MG TB24 tablet; Take 1 tablet (150 mg total) by mouth once daily.  Dispense: 30 tablet; Refill: 5    9. Good hypertension control  No changes at this time    10. Hypothyroidism, unspecified type  Lab Results   Component Value Date    TSH 0.774 02/27/2018         11. BMI 30.0-30.9,adult           Patient readiness: acceptance and barriers:social stressors    During the course of the visit the patient was educated and counseled about the following:     Diabetes:  Discussed general issues about diabetes pathophysiology and management.  Addressed ADA diet.  Encouraged aerobic exercise.  Discussed foot care.  Reminded to get yearly retinal  exam.  Hypertension:   Medication: no change.  Dietary sodium restriction.  Regular aerobic exercise.  Obesity:   General weight loss/lifestyle modification strategies discussed (elicit support from others; identify saboteurs; non-food rewards, etc).  Informal exercise measures discussed, e.g. taking stairs instead of elevator.  Regular aerobic exercise program discussed.    Goals: Diabetes: Maintain Hemoglobin A1C below 7, Hypertension: Reduce Blood Pressure and Obesity: Reduce calorie intake and BMI    Did patient meet goals/outcomes: No    The following self management tools provided: blood pressure log  blood glucose log  excercise log    Patient Instructions (the written plan) was given to the patient/family.     Time spent with patient: 45 minutes

## 2018-06-27 ENCOUNTER — INITIAL CONSULT (OUTPATIENT)
Dept: PHYSICAL MEDICINE AND REHAB | Facility: CLINIC | Age: 68
End: 2018-06-27
Attending: FAMILY MEDICINE
Payer: MEDICARE

## 2018-06-27 ENCOUNTER — TELEPHONE (OUTPATIENT)
Dept: RHEUMATOLOGY | Facility: CLINIC | Age: 68
End: 2018-06-27

## 2018-06-27 VITALS
DIASTOLIC BLOOD PRESSURE: 81 MMHG | WEIGHT: 178.13 LBS | HEART RATE: 66 BPM | SYSTOLIC BLOOD PRESSURE: 128 MMHG | BODY MASS INDEX: 30.41 KG/M2 | HEIGHT: 64 IN

## 2018-06-27 DIAGNOSIS — M67.952 TENDINOPATHY OF LEFT GLUTEAL REGION: Primary | ICD-10-CM

## 2018-06-27 DIAGNOSIS — Z96.652 STATUS POST TOTAL LEFT KNEE REPLACEMENT: ICD-10-CM

## 2018-06-27 DIAGNOSIS — M25.562 CHRONIC PAIN OF LEFT KNEE: ICD-10-CM

## 2018-06-27 DIAGNOSIS — G89.29 CHRONIC PAIN OF LEFT KNEE: ICD-10-CM

## 2018-06-27 PROCEDURE — 99999 PR PBB SHADOW E&M-EST. PATIENT-LVL III: CPT | Mod: PBBFAC,,, | Performed by: PHYSICAL MEDICINE & REHABILITATION

## 2018-06-27 PROCEDURE — 20611 DRAIN/INJ JOINT/BURSA W/US: CPT | Mod: LT,S$GLB,, | Performed by: PHYSICAL MEDICINE & REHABILITATION

## 2018-06-27 PROCEDURE — 99214 OFFICE O/P EST MOD 30 MIN: CPT | Mod: 25,S$GLB,, | Performed by: PHYSICAL MEDICINE & REHABILITATION

## 2018-06-27 PROCEDURE — 3074F SYST BP LT 130 MM HG: CPT | Mod: CPTII,S$GLB,, | Performed by: PHYSICAL MEDICINE & REHABILITATION

## 2018-06-27 PROCEDURE — 3079F DIAST BP 80-89 MM HG: CPT | Mod: CPTII,S$GLB,, | Performed by: PHYSICAL MEDICINE & REHABILITATION

## 2018-06-27 RX ORDER — ERGOCALCIFEROL 1.25 MG/1
50000 CAPSULE ORAL
COMMUNITY
End: 2019-02-26

## 2018-06-27 RX ORDER — BETAMETHASONE SODIUM PHOSPHATE AND BETAMETHASONE ACETATE 3; 3 MG/ML; MG/ML
6 INJECTION, SUSPENSION INTRA-ARTICULAR; INTRALESIONAL; INTRAMUSCULAR; SOFT TISSUE
Status: DISCONTINUED | OUTPATIENT
Start: 2018-06-27 | End: 2018-06-27 | Stop reason: HOSPADM

## 2018-06-27 RX ORDER — MULTIVIT WITH MINERALS/HERBS
1 TABLET ORAL DAILY
COMMUNITY
End: 2019-04-23 | Stop reason: ALTCHOICE

## 2018-06-27 RX ADMIN — BETAMETHASONE SODIUM PHOSPHATE AND BETAMETHASONE ACETATE 6 MG: 3; 3 INJECTION, SUSPENSION INTRA-ARTICULAR; INTRALESIONAL; INTRAMUSCULAR; SOFT TISSUE at 12:06

## 2018-06-27 NOTE — PROCEDURES
Large Joint Aspiration/Injection  Date/Time: 6/27/2018 12:02 PM  Performed by: KEYSHA TORRES  Authorized by: KEYSHA TORRES     Consent Done?:  Yes (Verbal)  Indications:  Pain  Procedure site marked: Yes    Timeout: Prior to procedure the correct patient, procedure, and site was verified      Location:  Hip  Site:  L greater trochanteric bursa  Prep: Patient was prepped and draped in usual sterile fashion    Ultrasonic Guidance for needle placement: Yes  Images are saved and documented.  Needle size:  22 G  Approach: needle in plane, lat to medial.  Medications:  6 mg betamethasone acetate-betamethasone sodium phosphate 6 mg/mL  Patient tolerance:  Patient tolerated the procedure well with no immediate complications    Additional Comments: Ultrasound guidance was used for correct needle placement, the images were saved will be uploaded to EMR.

## 2018-06-27 NOTE — PROGRESS NOTES
[facility]    Consulting Provider: Dr. Noah Chowdhury    CHIEF COMPLAINT: left knee pain    HISTORY OF PRESENT ILLNESS: Franca Coffey is a 68 y.o. female whom I have seen in the past (last visit 8/29/16) for left knee pain s/p left TKA. At that visit, she was about 2 weeks s/p left knee genicular RFA and was reporting resolution of her left knee pain.    Today, she states that, while she does have return of some pain in her left knee, she believes that she may be still be getting relief from that RFA. Her pain today is somewhat different from what she had at that time. She states that this pain got noticeably worse around 2 weeks ago, when she fell to her knees while working on her . Since that time, the pain has been about the same. Today, her pain is described as a dull, achy, 6/10, pain that is located at the inferior and lateral aspect of the left knee and radiates down the front of the left leg (shin) to the level of the left ankle. The pain is aggravated by standing and ascending stairs. It is relieved with rest. She has not tried topical or PO medications (other than her scheduled gabapentin and duloxetine).     She is also c/o some left hip pain since October of last year. This pain is located in the lateral aspect of the left hip, with radiation down the left lateral and anterior thigh to the knee. It is worse when pressing on the affected area, and at night when she is lying on the left side, frequently waking her from sleep.     Review of Systems   Constitutional: Negative for fever.   HENT: Negative for drooling.    Eyes: Negative for discharge.   Respiratory: Negative for choking.    Cardiovascular: Negative for chest pain.   Genitourinary: Negative for flank pain.   Skin: Negative for wound.   Allergic/Immunologic: Negative for immunocompromised state.   Neurological: Negative for tremors and syncope.   Psychiatric/Behavioral: Negative for behavioral problems.      Past Medical History:   Past Medical History:   Diagnosis Date    Anxiety     Cataract     Depression     Diabetes mellitus     oral meds    Dizzy     Fibromyalgia     GERD (gastroesophageal reflux disease)     History of bladder infections     Hypertension     Hypothyroid     IBS (irritable bowel syndrome)     Kidney stones     Meniere disease     Migraine     Muscle spasms of head and/or neck     and lower ext    Osteoarthritis     PONV (postoperative nausea and vomiting)     severe-cause by morphine per pt    Sleep apnea     CPAP       Past Surgical History:   Past Surgical History:   Procedure Laterality Date    BLADDER SUSPENSION      BREAST BIOPSY      COLONOSCOPY  8/27/14    Dr. Thomas, 5 year recheck    dental implant      upper RT implant    EYE SURGERY      bilateral PHACO with IOL    FOOT SURGERY Bilateral 12/05/2008    bunionectomy    FRACTURE SURGERY      HYSTERECTOMY      JOINT REPLACEMENT Left     Partial knee    JOINT REPLACEMENT Left     Total knee replacement    KNEE ARTHROSCOPY Bilateral     MANDIBLE FRACTURE SURGERY      MULTIPLE TOOTH EXTRACTIONS      revision of mesh      repair to bladder suspension    TONSILLECTOMY, ADENOIDECTOMY      UPPER GASTROINTESTINAL ENDOSCOPY      VAGINAL DELIVERY      times 2       Family History:   Family History   Problem Relation Age of Onset    Diabetes Mother     Heart disease Mother     Kidney disease Mother     Hypertension Mother     Hyperlipidemia Mother     Diabetes Mellitus Mother     Heart disease Father     Diabetes Father     Hypertension Father     Hyperlipidemia Father     Diabetes Mellitus Father     COPD Father     Heart disease Brother     Cancer Brother     Early death Brother     Stomach cancer Brother     Breast cancer Maternal Aunt     Heart disease Brother     Ovarian cancer Neg Hx     Cataracts Neg Hx     Glaucoma Neg Hx     Macular degeneration Neg Hx     Retinal detachment Neg  Hx     Thyroid disease Neg Hx     Stroke Neg Hx     Rheum arthritis Neg Hx     Psoriasis Neg Hx     Osteoarthritis Neg Hx     Lupus Neg Hx     Inflammatory bowel disease Neg Hx     Depression Neg Hx     Chronic back pain Neg Hx     Asthma Neg Hx        Medications:   Current Outpatient Prescriptions on File Prior to Visit   Medication Sig Dispense Refill    atorvastatin (LIPITOR) 40 MG tablet TAKE 1 TABLET EVERY DAY 90 tablet 2    BD ALCOHOL SWABS PadM       buPROPion (WELLBUTRIN XL) 150 MG TB24 tablet Take 1 tablet (150 mg total) by mouth once daily. 30 tablet 5    chlordiazepoxide-clidinium 5-2.5 mg (LIBRAX) 5-2.5 mg Cap Take by mouth. 2 qam, 1 q noon, 1 qhs  0    doxycycline (VIBRAMYCIN) 100 MG Cap Take 1 capsule (100 mg total) by mouth every 12 (twelve) hours. 20 capsule 0    DULoxetine (CYMBALTA) 60 MG capsule TAKE 1 CAPSULE TWICE DAILY 180 capsule 0    esomeprazole (NEXIUM) 40 MG capsule TAKE 1 CAPSULE EVERY DAY 90 capsule 1    furosemide (LASIX) 20 MG tablet TAKE 1 TABLET EVERY DAY 90 tablet 0    gabapentin (NEURONTIN) 100 MG capsule take 1 capsule by mouth once daily 90 capsule 1    gabapentin (NEURONTIN) 400 MG capsule Take 1 capsule (400 mg total) by mouth every evening. 90 capsule 1    levothyroxine (SYNTHROID) 75 MCG tablet TAKE 1 TABLET DAILY BEFORE BREAKFAST 90 tablet 3    lisinopril (PRINIVIL,ZESTRIL) 5 MG tablet Take 1 tablet (5 mg total) by mouth once daily. 90 tablet 2    metoprolol succinate (TOPROL-XL) 25 MG 24 hr tablet TAKE 1 TABLET EVERY DAY 90 tablet 0    nitrofurantoin, macrocrystal-monohydrate, (MACROBID) 100 MG capsule       topiramate (TOPAMAX) 50 MG tablet TAKE 1 TABLET ONE TIME DAILY 90 tablet 3    TRUE METRIX AIR GLUCOSE METER kit       TRUE METRIX GLUCOSE TEST STRIP Strp       TRUE METRIX LEVEL 1 Soln       TRUEPLUS LANCETS 28 gauge Misc        No current facility-administered medications on file prior to visit.        Allergies:   Review of patient's  "allergies indicates:   Allergen Reactions    Sulfa (sulfonamide antibiotics) Hives, Itching and Rash    Penicillins      Other reaction(s): Unknown. Childhood reaction. Pt does not remember reaction.    Adhesive Itching and Rash     Paper tape OK    Garlic Diarrhea    Morphine Nausea And Vomiting       Social History:   Social History     Social History    Marital status:      Spouse name: N/A    Number of children: N/A    Years of education: N/A     Social History Main Topics    Smoking status: Former Smoker     Packs/day: 0.25     Years: 1.00     Quit date: 10/15/1966    Smokeless tobacco: Never Used    Alcohol use Yes      Comment: occasionally    Drug use: No    Sexual activity: Not Currently     Partners: Male     Birth control/ protection: Post-menopausal, Surgical     Other Topics Concern    Not on file     Social History Narrative    No narrative on file     PHYSICAL EXAMINATION:   General    Vitals:    06/27/18 1121   Weight: 80.8 kg (178 lb 2.1 oz)   Height: 5' 4" (1.626 m)     Constitutional: Oriented to person, place, and time. No apparent distress. Appears well-developed and well-nourished. Pleasant.  HENT:   Head: Normocephalic and atraumatic.   Eyes: Right eye exhibits no discharge. Left eye exhibits no discharge. No scleral icterus.   Pulmonary/Chest: Effort normal. No respiratory distress.   Abdominal: There is no guarding.   Neurological: Alert and oriented to person, place, and time.   Psychiatric: Behavior is normal.   Left Knee Exam     Tenderness   The patient is experiencing tenderness in the lateral joint line and medial joint line (TTP tibial tuberosity).    Range of Motion   The patient has normal left knee ROM.  Extension: 0   Flexion: 140 (pain with flexion to 125 degrees)     Muscle Strength     The patient has normal left knee strength.    Tests   Varus: negative  Valgus: negative  Patellar Apprehension: negative    Other   Erythema: absent  Scars: " present  Sensation: normal  Pulse: present  Swelling: none  Effusion: no effusion present      Left Hip Exam     Tenderness   The patient is experiencing tenderness in the greater trochanter.    Muscle Strength   Abduction: 5/5   Adduction: 5/5   Flexion: 5/5         IMAGING:  Standing AP, PA flexion, and lateral views of both knees, along with sunrise patellar views of both knees   Comparison: 05/26/2016  Findings:  There has been a left cemented tricompartmental knee arthroplasty without evidence for loosening, migration, or periprosthetic fracture.  No joint effusion.  The right knee demonstrates severe medial and lateral compartment joint space loss upon flexion, along with scattered marginal osteophyte formation.  Worsening of joint space narrowing since previous examination is observed. No joint effusion.  Impression: Right knee osteoarthritis. Left knee arthroplasty    Data Reviewed: X-ray    Supportive Actions: Independent visualization of images or test specimens    ASSESSMENT:   1. Left knee pain s/p left TKA with previous significant relief from left knee genicular RFA in 2016.   2. Left hip pain 2/2 left gluteal tendinopathy.    PLAN:     1. Time was spent reviewing the above diagnosis in depth with Franca today, including acute management and rehabilitation.     2. Will schedule to repeat left knee genicular RFA as she did have a positive response in the past and her options for pain control remain limited post TKA.    3. Given her history and physical exam findings, we recommended steroid injection to left gluteal bursae. Patient agreed with this plan, and we proceeded with this procedure today in clinic under ultrasound guidance. See procedure note for further details.    4. We also gave Rx for physical therapy for treatment of her left gluteal tendinopathy.    5. We will see her next for the left knee genicular RFA as scheduled.    This is a consult from Dr. Noah Chowdhury. Please see the  ""Communications" section of Epic to see how the consulting physician received the report of today's findings and recommendations. If it's an [facility] physician, it will be forwarded to his/her "in basket".    The above note was completed, in part, with the aid of Dragon dictation software/hardware. Translation errors may be present.    "

## 2018-06-27 NOTE — TELEPHONE ENCOUNTER
Spoke to [facility] advised did not receive fax. [facility] rep states she believes she is faxing the wrong dr as they are looking for signature by pain management on back and knee braces. Advised rep to contact Dr. Rosas or Dr. Byers

## 2018-06-27 NOTE — TELEPHONE ENCOUNTER
----- Message from Federico Berkowitz sent at 6/27/2018 11:53 AM CDT -----  Contact: [employer]  [employer], [phone]. Calling to confirm orders for back and knee brace was received from their office. Please advise. Thanks.

## 2018-06-27 NOTE — LETTER
June 27, 2018      Noah Chowdhury MD  [address]  [place] [address]           [facility]  [address] [place] [address]  [address]  [place] [address]  Phone: [phone]  Fax: [phone]          Patient: Franca Coffey   MR Number: [id number]   YOB: 1950   Date of Visit: 6/27/2018       Dear Dr. Noah Chowdhury:    Thank you for referring Franca Coffey to me for evaluation. Attached you will find relevant portions of my assessment and plan of care.    If you have questions, please do not hesitate to call me. I look forward to following Franca Coffey along with you.    Sincerely,    Gee Byers MD    Enclosure  CC:  No Recipients    If you would like to receive this communication electronically, please contact [email or web address] or [phone] to request more information on [employer] Link access.    For providers and/or their staff who would like to refer a patient to [facility], please contact us through our one-stop-shop provider referral line, [facility], at [phone].    If you feel you have received this communication in error or would no longer like to receive these types of communications, please e-mail [email or web address]

## 2018-07-03 DIAGNOSIS — M25.562 CHRONIC PAIN OF LEFT KNEE: Primary | ICD-10-CM

## 2018-07-03 DIAGNOSIS — G89.29 CHRONIC PAIN OF LEFT KNEE: Primary | ICD-10-CM

## 2018-07-26 DIAGNOSIS — M79.7 FIBROMYALGIA: ICD-10-CM

## 2018-07-26 RX ORDER — DULOXETIN HYDROCHLORIDE 60 MG/1
CAPSULE, DELAYED RELEASE ORAL
Qty: 180 CAPSULE | Refills: 0 | Status: SHIPPED | OUTPATIENT
Start: 2018-07-26 | End: 2018-10-08 | Stop reason: SDUPTHER

## 2018-07-31 ENCOUNTER — TELEPHONE (OUTPATIENT)
Dept: PHYSICAL MEDICINE AND REHAB | Facility: CLINIC | Age: 68
End: 2018-07-31

## 2018-07-31 DIAGNOSIS — M79.7 FIBROMYALGIA: ICD-10-CM

## 2018-07-31 DIAGNOSIS — M54.42 CHRONIC LEFT-SIDED LOW BACK PAIN WITH LEFT-SIDED SCIATICA: ICD-10-CM

## 2018-07-31 DIAGNOSIS — G89.29 CHRONIC LEFT-SIDED LOW BACK PAIN WITH LEFT-SIDED SCIATICA: ICD-10-CM

## 2018-07-31 DIAGNOSIS — M54.31 SCIATICA OF RIGHT SIDE: ICD-10-CM

## 2018-07-31 DIAGNOSIS — M25.562 LEFT KNEE PAIN: Primary | ICD-10-CM

## 2018-07-31 RX ORDER — GABAPENTIN 100 MG/1
CAPSULE ORAL
Qty: 90 CAPSULE | Refills: 0 | Status: SHIPPED | OUTPATIENT
Start: 2018-07-31 | End: 2018-10-28 | Stop reason: SDUPTHER

## 2018-07-31 NOTE — TELEPHONE ENCOUNTER
----- Message from Trina Zhang sent at 7/31/2018  1:21 PM CDT -----  Type: Needs Medical Advice    Who Called:  Patient  Best Call Back Number: [phone]  Additional Information: Patient needs to speak with nurse concerning upcoming procedure/has question/please call back to advise.

## 2018-08-03 ENCOUNTER — ANESTHESIA (OUTPATIENT)
Dept: SURGERY | Facility: HOSPITAL | Age: 68
End: 2018-08-03
Payer: MEDICARE

## 2018-08-03 ENCOUNTER — HOSPITAL ENCOUNTER (OUTPATIENT)
Dept: RADIOLOGY | Facility: HOSPITAL | Age: 68
Discharge: HOME | End: 2018-08-03
Attending: PHYSICAL MEDICINE & REHABILITATION | Admitting: PHYSICAL MEDICINE & REHABILITATION
Payer: MEDICARE

## 2018-08-03 ENCOUNTER — HOSPITAL ENCOUNTER (OUTPATIENT)
Facility: HOSPITAL | Age: 68
Discharge: HOME | End: 2018-08-03
Attending: PHYSICAL MEDICINE & REHABILITATION | Admitting: PHYSICAL MEDICINE & REHABILITATION
Payer: MEDICARE

## 2018-08-03 ENCOUNTER — ANESTHESIA EVENT (OUTPATIENT)
Dept: SURGERY | Facility: HOSPITAL | Age: 68
End: 2018-08-03
Payer: MEDICARE

## 2018-08-03 VITALS
BODY MASS INDEX: 33.13 KG/M2 | SYSTOLIC BLOOD PRESSURE: 167 MMHG | RESPIRATION RATE: 18 BRPM | TEMPERATURE: 98 F | HEART RATE: 69 BPM | WEIGHT: 180 LBS | OXYGEN SATURATION: 98 % | DIASTOLIC BLOOD PRESSURE: 80 MMHG | HEIGHT: 62 IN

## 2018-08-03 DIAGNOSIS — G89.29 CHRONIC PAIN OF LEFT KNEE: ICD-10-CM

## 2018-08-03 DIAGNOSIS — M25.562 LEFT KNEE PAIN: ICD-10-CM

## 2018-08-03 DIAGNOSIS — M25.562 CHRONIC PAIN OF LEFT KNEE: ICD-10-CM

## 2018-08-03 LAB — GLUCOSE SERPL-MCNC: 112 MG/DL (ref 70–110)

## 2018-08-03 PROCEDURE — 37000009 HC ANESTHESIA EA ADD 15 MINS: Mod: PO | Performed by: PHYSICAL MEDICINE & REHABILITATION

## 2018-08-03 PROCEDURE — 63600175 PHARM REV CODE 636 W HCPCS: Mod: PO | Performed by: NURSE ANESTHETIST, CERTIFIED REGISTERED

## 2018-08-03 PROCEDURE — 76000 FLUOROSCOPY <1 HR PHYS/QHP: CPT | Mod: TC,PO

## 2018-08-03 PROCEDURE — 63600175 PHARM REV CODE 636 W HCPCS: Mod: PO | Performed by: PHYSICAL MEDICINE & REHABILITATION

## 2018-08-03 PROCEDURE — 37000008 HC ANESTHESIA 1ST 15 MINUTES: Mod: PO | Performed by: PHYSICAL MEDICINE & REHABILITATION

## 2018-08-03 PROCEDURE — D9220A PRA ANESTHESIA: Mod: CRNA,,, | Performed by: NURSE ANESTHETIST, CERTIFIED REGISTERED

## 2018-08-03 PROCEDURE — D9220A PRA ANESTHESIA: Mod: ANES,,, | Performed by: ANESTHESIOLOGY

## 2018-08-03 PROCEDURE — 25000003 PHARM REV CODE 250: Mod: PO | Performed by: NURSE ANESTHETIST, CERTIFIED REGISTERED

## 2018-08-03 PROCEDURE — 64640 INJECTION TREATMENT OF NERVE: CPT | Mod: LT,,, | Performed by: PHYSICAL MEDICINE & REHABILITATION

## 2018-08-03 PROCEDURE — 71000033 HC RECOVERY, INTIAL HOUR: Mod: PO | Performed by: PHYSICAL MEDICINE & REHABILITATION

## 2018-08-03 PROCEDURE — 82962 GLUCOSE BLOOD TEST: CPT | Mod: PO | Performed by: PHYSICAL MEDICINE & REHABILITATION

## 2018-08-03 PROCEDURE — 25000003 PHARM REV CODE 250: Mod: PO | Performed by: PHYSICAL MEDICINE & REHABILITATION

## 2018-08-03 PROCEDURE — 64640 INJECTION TREATMENT OF NERVE: CPT | Mod: PO | Performed by: PHYSICAL MEDICINE & REHABILITATION

## 2018-08-03 RX ORDER — ONDANSETRON 2 MG/ML
4 INJECTION INTRAMUSCULAR; INTRAVENOUS ONCE AS NEEDED
Status: DISCONTINUED | OUTPATIENT
Start: 2018-08-03 | End: 2018-08-03 | Stop reason: HOSPADM

## 2018-08-03 RX ORDER — OXYCODONE HYDROCHLORIDE 5 MG/1
5 TABLET ORAL ONCE AS NEEDED
Status: DISCONTINUED | OUTPATIENT
Start: 2018-08-03 | End: 2018-08-03 | Stop reason: HOSPADM

## 2018-08-03 RX ORDER — LIDOCAINE HYDROCHLORIDE 10 MG/ML
INJECTION, SOLUTION EPIDURAL; INFILTRATION; INTRACAUDAL; PERINEURAL
Status: DISCONTINUED | OUTPATIENT
Start: 2018-08-03 | End: 2018-08-03 | Stop reason: HOSPADM

## 2018-08-03 RX ORDER — DEXAMETHASONE SODIUM PHOSPHATE 4 MG/ML
INJECTION, SOLUTION INTRA-ARTICULAR; INTRALESIONAL; INTRAMUSCULAR; INTRAVENOUS; SOFT TISSUE
Status: DISCONTINUED | OUTPATIENT
Start: 2018-08-03 | End: 2018-08-03 | Stop reason: HOSPADM

## 2018-08-03 RX ORDER — PROPOFOL 10 MG/ML
VIAL (ML) INTRAVENOUS CONTINUOUS PRN
Status: DISCONTINUED | OUTPATIENT
Start: 2018-08-03 | End: 2018-08-03

## 2018-08-03 RX ORDER — MIDAZOLAM HYDROCHLORIDE 1 MG/ML
1 INJECTION INTRAMUSCULAR; INTRAVENOUS
Status: DISCONTINUED | OUTPATIENT
Start: 2018-08-03 | End: 2018-08-03 | Stop reason: HOSPADM

## 2018-08-03 RX ORDER — LIDOCAINE HYDROCHLORIDE 20 MG/ML
INJECTION, SOLUTION EPIDURAL; INFILTRATION; INTRACAUDAL; PERINEURAL
Status: DISCONTINUED | OUTPATIENT
Start: 2018-08-03 | End: 2018-08-03 | Stop reason: HOSPADM

## 2018-08-03 RX ORDER — FENTANYL CITRATE 50 UG/ML
INJECTION, SOLUTION INTRAMUSCULAR; INTRAVENOUS
Status: DISCONTINUED | OUTPATIENT
Start: 2018-08-03 | End: 2018-08-03

## 2018-08-03 RX ORDER — PROPOFOL 10 MG/ML
VIAL (ML) INTRAVENOUS
Status: DISCONTINUED | OUTPATIENT
Start: 2018-08-03 | End: 2018-08-03

## 2018-08-03 RX ORDER — MIDAZOLAM HYDROCHLORIDE 1 MG/ML
INJECTION, SOLUTION INTRAMUSCULAR; INTRAVENOUS
Status: DISCONTINUED | OUTPATIENT
Start: 2018-08-03 | End: 2018-08-03

## 2018-08-03 RX ORDER — LIDOCAINE HCL/PF 100 MG/5ML
SYRINGE (ML) INTRAVENOUS
Status: DISCONTINUED | OUTPATIENT
Start: 2018-08-03 | End: 2018-08-03

## 2018-08-03 RX ORDER — SODIUM CHLORIDE, SODIUM LACTATE, POTASSIUM CHLORIDE, CALCIUM CHLORIDE 600; 310; 30; 20 MG/100ML; MG/100ML; MG/100ML; MG/100ML
INJECTION, SOLUTION INTRAVENOUS CONTINUOUS PRN
Status: DISCONTINUED | OUTPATIENT
Start: 2018-08-03 | End: 2018-08-03

## 2018-08-03 RX ORDER — SODIUM CHLORIDE 0.9 % (FLUSH) 0.9 %
3 SYRINGE (ML) INJECTION
Status: DISCONTINUED | OUTPATIENT
Start: 2018-08-03 | End: 2018-08-03 | Stop reason: HOSPADM

## 2018-08-03 RX ORDER — FENTANYL CITRATE 50 UG/ML
25 INJECTION, SOLUTION INTRAMUSCULAR; INTRAVENOUS EVERY 5 MIN PRN
Status: DISCONTINUED | OUTPATIENT
Start: 2018-08-03 | End: 2018-08-03 | Stop reason: HOSPADM

## 2018-08-03 RX ADMIN — PROPOFOL 50 MG: 10 INJECTION, EMULSION INTRAVENOUS at 07:08

## 2018-08-03 RX ADMIN — FENTANYL CITRATE 50 MCG: 50 INJECTION, SOLUTION INTRAMUSCULAR; INTRAVENOUS at 07:08

## 2018-08-03 RX ADMIN — FENTANYL CITRATE 25 MCG: 50 INJECTION, SOLUTION INTRAMUSCULAR; INTRAVENOUS at 08:08

## 2018-08-03 RX ADMIN — PROPOFOL 120 MCG/KG/MIN: 10 INJECTION, EMULSION INTRAVENOUS at 07:08

## 2018-08-03 RX ADMIN — LIDOCAINE HYDROCHLORIDE 75 MG: 20 INJECTION PARENTERAL at 07:08

## 2018-08-03 RX ADMIN — MIDAZOLAM HYDROCHLORIDE 2 MG: 1 INJECTION, SOLUTION INTRAMUSCULAR; INTRAVENOUS at 07:08

## 2018-08-03 RX ADMIN — SODIUM CHLORIDE, SODIUM LACTATE, POTASSIUM CHLORIDE, AND CALCIUM CHLORIDE: .6; .31; .03; .02 INJECTION, SOLUTION INTRAVENOUS at 07:08

## 2018-08-03 NOTE — H&P
CHIEF COMPLAINT: left knee pain     HISTORY OF PRESENT ILLNESS: Franca Coffey is a 68 y.o. female here for elective left knee genicular nerve RFA. She notes persistent pain since her last visit.     Review of Systems   Constitutional: Negative for fever.   HENT: Negative for drooling.    Eyes: Negative for discharge.   Respiratory: Negative for choking.    Cardiovascular: Negative for chest pain.   Genitourinary: Negative for flank pain.   Skin: Negative for wound.   Allergic/Immunologic: Negative for immunocompromised state.   Neurological: Negative for tremors and syncope.   Psychiatric/Behavioral: Negative for behavioral problems.      Past Medical History:        Past Medical History:   Diagnosis Date    Anxiety      Cataract      Depression      Diabetes mellitus       oral meds    Dizzy      Fibromyalgia      GERD (gastroesophageal reflux disease)      History of bladder infections      Hypertension      Hypothyroid      IBS (irritable bowel syndrome)      Kidney stones      Meniere disease      Migraine      Muscle spasms of head and/or neck       and lower ext    Osteoarthritis      PONV (postoperative nausea and vomiting)       severe-cause by morphine per pt    Sleep apnea       CPAP         Past Surgical History:         Past Surgical History:   Procedure Laterality Date    BLADDER SUSPENSION        BREAST BIOPSY        COLONOSCOPY   8/27/14     Dr. Thomas, 5 year recheck    dental implant         upper RT implant    EYE SURGERY         bilateral PHACO with IOL    FOOT SURGERY Bilateral 12/05/2008     bunionectomy    FRACTURE SURGERY        HYSTERECTOMY        JOINT REPLACEMENT Left       Partial knee    JOINT REPLACEMENT Left       Total knee replacement    KNEE ARTHROSCOPY Bilateral      MANDIBLE FRACTURE SURGERY        MULTIPLE TOOTH EXTRACTIONS        revision of mesh         repair to bladder suspension    TONSILLECTOMY, ADENOIDECTOMY        UPPER  GASTROINTESTINAL ENDOSCOPY        VAGINAL DELIVERY         times 2         Family History:         Family History   Problem Relation Age of Onset    Diabetes Mother      Heart disease Mother      Kidney disease Mother      Hypertension Mother      Hyperlipidemia Mother      Diabetes Mellitus Mother      Heart disease Father      Diabetes Father      Hypertension Father      Hyperlipidemia Father      Diabetes Mellitus Father      COPD Father      Heart disease Brother      Cancer Brother      Early death Brother      Stomach cancer Brother      Breast cancer Maternal Aunt      Heart disease Brother      Ovarian cancer Neg Hx      Cataracts Neg Hx      Glaucoma Neg Hx      Macular degeneration Neg Hx      Retinal detachment Neg Hx      Thyroid disease Neg Hx      Stroke Neg Hx      Rheum arthritis Neg Hx      Psoriasis Neg Hx      Osteoarthritis Neg Hx      Lupus Neg Hx      Inflammatory bowel disease Neg Hx      Depression Neg Hx      Chronic back pain Neg Hx      Asthma Neg Hx           Medications:          Current Outpatient Prescriptions on File Prior to Visit   Medication Sig Dispense Refill    atorvastatin (LIPITOR) 40 MG tablet TAKE 1 TABLET EVERY DAY 90 tablet 2    BD ALCOHOL SWABS PadM          buPROPion (WELLBUTRIN XL) 150 MG TB24 tablet Take 1 tablet (150 mg total) by mouth once daily. 30 tablet 5    chlordiazepoxide-clidinium 5-2.5 mg (LIBRAX) 5-2.5 mg Cap Take by mouth. 2 qam, 1 q noon, 1 qhs   0    doxycycline (VIBRAMYCIN) 100 MG Cap Take 1 capsule (100 mg total) by mouth every 12 (twelve) hours. 20 capsule 0    DULoxetine (CYMBALTA) 60 MG capsule TAKE 1 CAPSULE TWICE DAILY 180 capsule 0    esomeprazole (NEXIUM) 40 MG capsule TAKE 1 CAPSULE EVERY DAY 90 capsule 1    furosemide (LASIX) 20 MG tablet TAKE 1 TABLET EVERY DAY 90 tablet 0    gabapentin (NEURONTIN) 100 MG capsule take 1 capsule by mouth once daily 90 capsule 1    gabapentin (NEURONTIN) 400 MG  "capsule Take 1 capsule (400 mg total) by mouth every evening. 90 capsule 1    levothyroxine (SYNTHROID) 75 MCG tablet TAKE 1 TABLET DAILY BEFORE BREAKFAST 90 tablet 3    lisinopril (PRINIVIL,ZESTRIL) 5 MG tablet Take 1 tablet (5 mg total) by mouth once daily. 90 tablet 2    metoprolol succinate (TOPROL-XL) 25 MG 24 hr tablet TAKE 1 TABLET EVERY DAY 90 tablet 0    nitrofurantoin, macrocrystal-monohydrate, (MACROBID) 100 MG capsule          topiramate (TOPAMAX) 50 MG tablet TAKE 1 TABLET ONE TIME DAILY 90 tablet 3    TRUE METRIX AIR GLUCOSE METER kit          TRUE METRIX GLUCOSE TEST STRIP Strp          TRUE METRIX LEVEL 1 Soln          TRUEPLUS LANCETS 28 gauge Misc            No current facility-administered medications on file prior to visit.          Allergies:         Review of patient's allergies indicates:   Allergen Reactions    Sulfa (sulfonamide antibiotics) Hives, Itching and Rash    Penicillins         Other reaction(s): Unknown. Childhood reaction. Pt does not remember reaction.    Adhesive Itching and Rash       Paper tape OK    Garlic Diarrhea    Morphine Nausea And Vomiting         Social History:   Social History            Social History    Marital status:        Spouse name: N/A    Number of children: N/A    Years of education: N/A             Social History Main Topics    Smoking status: Former Smoker       Packs/day: 0.25       Years: 1.00       Quit date: 10/15/1966    Smokeless tobacco: Never Used    Alcohol use Yes         Comment: occasionally    Drug use: No    Sexual activity: Not Currently       Partners: Male       Birth control/ protection: Post-menopausal, Surgical           Other Topics Concern    Not on file          Social History Narrative    No narrative on file      PHYSICAL EXAMINATION:   General     Vitals       Vitals:     06/27/18 1121   Weight: 80.8 kg (178 lb 2.1 oz)   Height: 5' 4" (1.626 m)         Constitutional: Oriented to person, place, " and time. No apparent distress. Appears well-developed and well-nourished. Pleasant.  HENT:   Head: Normocephalic and atraumatic.   Eyes: Right eye exhibits no discharge. Left eye exhibits no discharge. No scleral icterus.   Pulmonary/Chest: Effort normal. No respiratory distress.   Abdominal: There is no guarding.   Neurological: Alert and oriented to person, place, and time.   Psychiatric: Behavior is normal.   Left Knee Exam      Tenderness   The patient is experiencing tenderness in the lateral joint line and medial joint line (TTP tibial tuberosity).     Range of Motion   The patient has normal left knee ROM.  Extension: 0   Flexion: 140 (pain with flexion to 125 degrees)      Muscle Strength      The patient has normal left knee strength.     Tests   Varus: negative  Valgus: negative  Patellar Apprehension: negative     Other   Erythema: absent  Scars: present  Sensation: normal  Pulse: present  Swelling: none  Effusion: no effusion present        Left Hip Exam      Tenderness   The patient is experiencing tenderness in the greater trochanter.     Muscle Strength   Abduction: 5/5   Adduction: 5/5   Flexion: 5/5      IMAGING:  Standing AP, PA flexion, and lateral views of both knees, along with sunrise patellar views of both knees   Comparison: 05/26/2016  Findings:  There has been a left cemented tricompartmental knee arthroplasty without evidence for loosening, migration, or periprosthetic fracture.  No joint effusion.  The right knee demonstrates severe medial and lateral compartment joint space loss upon flexion, along with scattered marginal osteophyte formation.  Worsening of joint space narrowing since previous examination is observed. No joint effusion.  Impression: Right knee osteoarthritis. Left knee arthroplasty     Data Reviewed: X-ray     Supportive Actions: Independent visualization of images or test specimens     ASSESSMENT:   Left knee pain s/p left TKA with previous significant relief from left  knee genicular RFA in 2016.      PLAN:      1.  We will proceed today with left knee genicular RFA, pt consented.     2. She will f/u in clinic in 2 weeks.

## 2018-08-03 NOTE — DISCHARGE INSTRUCTIONS
Discharge Instructions: After Your Surgery  Youve just had surgery. During surgery, you were given medicine called anesthesia to keep you relaxed and free of pain. After surgery, you may have some pain or nausea. This is common. Here are some tips for feeling better and getting well after surgery.     Stay on schedule with your medicine.   Going home  Your healthcare provider will show you how to take care of yourself when you go home. He or she will also answer your questions. Have an adult family member or friend drive you home. For the first 24 hours after your surgery:  · Do not drive or use heavy equipment.  · Do not make important decisions or sign legal papers.  · Do not drink alcohol.  · Have someone stay with you, if needed. He or she can watch for problems and help keep you safe.  Be sure to go to all follow-up visits with your healthcare provider. And rest after your surgery for as long as your healthcare provider tells you to.  Coping with pain  If you have pain after surgery, pain medicine will help you feel better. Take it as told, before pain becomes severe. Also, ask your healthcare provider or pharmacist about other ways to control pain. This might be with heat, ice, or relaxation. And follow any other instructions your surgeon or nurse gives you.  Tips for taking pain medicine  To get the best relief possible, remember these points:  · Pain medicines can upset your stomach. Taking them with a little food may help.  · Most pain relievers taken by mouth need at least 20 to 30 minutes to start to work.  · Taking medicine on a schedule can help you remember to take it. Try to time your medicine so that you can take it before starting an activity. This might be before you get dressed, go for a walk, or sit down for dinner.  · Constipation is a common side effect of pain medicines. Call your healthcare provider before taking any medicines such as laxatives or stool softeners to help ease constipation.  Also ask if you should skip any foods. Drinking lots of fluids and eating foods such as fruits and vegetables that are high in fiber can also help. Remember, do not take laxatives unless your surgeon has prescribed them.  · Drinking alcohol and taking pain medicine can cause dizziness and slow your breathing. It can even be deadly. Do not drink alcohol while taking pain medicine.  · Pain medicine can make you react more slowly to things. Do not drive or run machinery while taking pain medicine.  Your healthcare provider may tell you to take acetaminophen to help ease your pain. Ask him or her how much you are supposed to take each day. Acetaminophen or other pain relievers may interact with your prescription medicines or other over-the-counter (OTC) medicines. Some prescription medicines have acetaminophen and other ingredients. Using both prescription and OTC acetaminophen for pain can cause you to overdose. Read the labels on your OTC medicines with care. This will help you to clearly know the list of ingredients, how much to take, and any warnings. It may also help you not take too much acetaminophen. If you have questions or do not understand the information, ask your pharmacist or healthcare provider to explain it to you before you take the OTC medicine.  Managing nausea  Some people have an upset stomach after surgery. This is often because of anesthesia, pain, or pain medicine, or the stress of surgery. These tips will help you handle nausea and eat healthy foods as you get better. If you were on a special food plan before surgery, ask your healthcare provider if you should follow it while you get better. These tips may help:  · Do not push yourself to eat. Your body will tell you when to eat and how much.  · Start off with clear liquids and soup. They are easier to digest.  · Next try semi-solid foods, such as mashed potatoes, applesauce, and gelatin, as you feel ready.  · Slowly move to solid foods. Dont  eat fatty, rich, or spicy foods at first.  · Do not force yourself to have 3 large meals a day. Instead eat smaller amounts more often.  · Take pain medicines with a small amount of solid food, such as crackers or toast, to avoid nausea.     Call your surgeon if  · You still have pain an hour after taking medicine. The medicine may not be strong enough.  · You feel too sleepy, dizzy, or groggy. The medicine may be too strong.  · You have side effects like nausea, vomiting, or skin changes, such as rash, itching, or hives.       If you have obstructive sleep apnea  You were given anesthesia medicine during surgery to keep you comfortable and free of pain. After surgery, you may have more apnea spells because of this medicine and other medicines you were given. The spells may last longer than usual.   At home:  · Keep using the continuous positive airway pressure (CPAP) device when you sleep. Unless your healthcare provider tells you not to, use it when you sleep, day or night. CPAP is a common device used to treat obstructive sleep apnea.  · Talk with your provider before taking any pain medicine, muscle relaxants, or sedatives. Your provider will tell you about the possible dangers of taking these medicines.  Date Last Reviewed: 12/1/2016 © 2000-2017 [employer]. [address] [place] [address], [place] [address]. All rights reserved. This information is not intended as a substitute for professional medical care. Always follow your healthcare professional's instructions.    Home care instructions  Apply ice pack to the injection site for 20 minutes periods for the first 24 hrs for soreness/discomfort at injection site DO NOT USE HEAT FOR 24 HOURS  Keep site clean and dry for 24 hours, remove bandaid when desired  Do not drive until tomorrow  Take care when walking after a knee ablation  Avoid strenuous activities for 2 days  Make take 2 weeks to feel the full effects   Resume home medication as prescribed  today  Resume Aspirin, Plavix, or Coumadin the day after the procedure unless otherwise instructed.    SEE IMMEDIATE MEDICAL HELP FOR:  Severe increase in your usual pain or appearance of new pain  Prolonged or increasing weakness or numbness in the legs or arms  Drainage, redness, active bleeding, or increased swelling at the injection site  Temperature over 100.0 degrees F.  Headache that increases when your head is upright and decreases when you lie flat    CALL 911 OR GO DIRECTLY TO EMERGENCY DEPARTMENT FOR:  Shortness of breath, chest pain, or problems breathing

## 2018-08-03 NOTE — TRANSFER OF CARE
"Anesthesia Transfer of Care Note    Patient: Franca Coffey    Procedure(s) Performed: Procedure(s) (LRB):  RADIOFREQUENCY ABLATION (Left)    Patient location: PACU    Anesthesia Type: general    Transport from OR: Transported from OR on room air with adequate spontaneous ventilation    Post pain: adequate analgesia    Post assessment: no apparent anesthetic complications    Post vital signs: stable    Level of consciousness: awake and sedated    Nausea/Vomiting: no nausea/vomiting    Complications: none    Transfer of care protocol was followed      Last vitals:   Visit Vitals  BP (!) 199/91 (BP Location: Left arm, Patient Position: Sitting)   Pulse 64   Temp 36.5 °C (97.7 °F) (Skin)   Resp 14   Ht 5' 2" (1.575 m)   Wt 81.6 kg (180 lb)   SpO2 99%   Breastfeeding? No   BMI 32.92 kg/m²     "

## 2018-08-03 NOTE — ANESTHESIA POSTPROCEDURE EVALUATION
"Anesthesia Post Evaluation    Patient: Franca Coffey    Procedure(s) Performed: Procedure(s) (LRB):  RADIOFREQUENCY ABLATION (Left)    Final Anesthesia Type: general  Patient location during evaluation: PACU  Patient participation: Yes- Able to Participate  Level of consciousness: awake and alert  Post-procedure vital signs: reviewed and stable  Pain management: adequate  Airway patency: patent  PONV status at discharge: No PONV  Anesthetic complications: no      Cardiovascular status: blood pressure returned to baseline and hemodynamically stable  Respiratory status: unassisted  Hydration status: euvolemic  Follow-up not needed.        Visit Vitals  BP (!) 167/80 (BP Location: Right arm, Patient Position: Sitting)   Pulse 69   Temp 36.6 °C (97.8 °F) (Skin)   Resp 18   Ht 5' 2" (1.575 m)   Wt 81.6 kg (180 lb)   SpO2 98%   Breastfeeding? No   BMI 32.92 kg/m²       Pain/Kevin Score: Pain Assessment Performed: Yes (8/3/2018  8:48 AM)  Presence of Pain: denies (8/3/2018  8:48 AM)  Kevin Score: 10 (8/3/2018  8:48 AM)      "

## 2018-08-03 NOTE — ANESTHESIA PREPROCEDURE EVALUATION
08/03/2018  Franca Coffey is a 68 y.o., female.    Anesthesia Evaluation    I have reviewed the Patient Summary Reports.    I have reviewed the Nursing Notes.      Review of Systems  Anesthesia Hx:  PONV   Cardiovascular:   Hypertension, well controlled    Endocrine:   Diabetes, well controlled, type 2 Hypothyroidism        Physical Exam  General:  Obesity    Airway/Jaw/Neck:  Airway Findings: Mallampati: II                Anesthesia Plan  Type of Anesthesia, risks & benefits discussed:  Anesthesia Type:  MAC  Patient's Preference:   Intra-op Monitoring Plan:   Intra-op Monitoring Plan Comments:   Post Op Pain Control Plan:   Post Op Pain Control Plan Comments:   Induction:   IV  Beta Blocker:  Patient is not currently on a Beta-Blocker (No further documentation required).       Informed Consent: Patient understands risks and agrees with Anesthesia plan.  Questions answered. Anesthesia consent signed with patient.  ASA Score: 2     Day of Surgery Review of History & Physical:    H&P update referred to the surgeon.         Ready For Surgery From Anesthesia Perspective.

## 2018-08-03 NOTE — OP NOTE
Operative Note       Surgery Date: 8/3/2018     Surgeon(s) and Role:     * Gee Byers MD - Primary    Pre-op Diagnosis:  Chronic pain of left knee [M25.562, G89.29]    Post-op Diagnosis: Post-Op Diagnosis Codes:     * Chronic pain of left knee [M25.562, G89.29]    Procedure:  1.  Left knee genicular nerve radiofrequency ablation ×3 nerve branches.  2.  Fluoroscopic guidance.    Anesthesia: Local MAC    Description of Procedure:    Franca Coffey is a 68 y.o. female with chronic left knee pain presents for an elective radiofrequency genicular nerve ablation of a total of 3 individual genicular branches of the left knee. We discussed risks, benefits, and alternatives. Patient's verbal and written consent was obtained. She was brought to the fluoro scopic suite, placed in supine position. The left knee was prepped and draped in the usual sterile fashion. 1% lidocaine was used to anesthetize the overlying subcutaneous cutaneous tissues at each of the 3 sites using a 27-gauge 1.5 inch needle on a 10 cc syringe. Using AP and lateral views, a 17-gauge introducer needle was guided under intermittent fluoroscopic imaging first to the distal metaphysis of the medial aspect of the femur. A similar needle was placed at the lateral aspect of the distal femur. A third introducer needle was guided to the proximal aspect of the tibial metaphysis medially. All 3 needles being placed near or around each genicular nerve origin prior to entering the knee joint. Motor stimulation was then performed at 2 V. There were no observed muscle contractions at either of the 3 sites. Following motor stimulation 1 cc of 2% lidocaine was then injected into the introducer needles and stylets were replaced. Next, the radiofrequency probe was placed into the introducer needle and radiofrequency lesioning was performed at 84°C for 2 minutes, 30 seconds. Next, the probe was removed and placed in the second genicular location and  radiofrequency lesioning was performed here. Finally, the third radiofrequency lesioning was performed at the last branch. Following lesioning, a mixture of 1 cc of dexamethasone, 4 mg with 2cc 1% lidocaine was injected through the introducer needles. The needles were then re-styletted and removed. A total of 3 nerve branches were lesioned today. Band-Aids were applied to the puncture sites after applying antibiotic ointment. The patient was transported to the postoperative recovery area in good condition. Approximately 20 minutes after the procedure, motor strength examination revealed no weakness. The patient reported no paresthesias in the affected extremity. Patient was discharged in good condition and will follow-up in 2 weeks in the office.        Estimated Blood Loss: Minimal    Attestation:  I performed the procedure.           Discharge Note    Admit Date: 8/3/2018    Attending Physician: Gee Byers MD     Discharge Physician: Gee Byers MD    Final Diagnosis: Post-Op Diagnosis Codes:     * Chronic pain of left knee [M25.562, G89.29]    Disposition: Home or Self Care, pt discharged in good condition and will f/u in clinic in 2 weeks.    Patient Instructions:   Current Discharge Medication List      CONTINUE these medications which have NOT CHANGED    Details   atorvastatin (LIPITOR) 40 MG tablet TAKE 1 TABLET EVERY DAY  Qty: 90 tablet, Refills: 2    Associated Diagnoses: Family history of premature CAD; Cardiovascular event risk; Hypercholesterolemia      b complex vitamins tablet Take 1 tablet by mouth once daily.      buPROPion (WELLBUTRIN XL) 150 MG TB24 tablet Take 1 tablet (150 mg total) by mouth once daily.  Qty: 30 tablet, Refills: 5    Associated Diagnoses: Current moderate episode of major depressive disorder without prior episode      chlordiazepoxide-clidinium 5-2.5 mg (LIBRAX) 5-2.5 mg Cap Take by mouth. 2 qam, 1 q noon, 1 qhs  Refills: 0      DULoxetine (CYMBALTA) 60 MG  capsule TAKE 1 CAPSULE TWICE DAILY  Qty: 180 capsule, Refills: 0    Associated Diagnoses: Fibromyalgia      ergocalciferol (VITAMIN D2) 50,000 unit Cap Take 50,000 Units by mouth every 7 days.      esomeprazole (NEXIUM) 40 MG capsule TAKE 1 CAPSULE EVERY DAY  Qty: 90 capsule, Refills: 1    Associated Diagnoses: Heartburn      furosemide (LASIX) 20 MG tablet TAKE 1 TABLET EVERY DAY  Qty: 90 tablet, Refills: 0    Associated Diagnoses: Essential hypertension      !! gabapentin (NEURONTIN) 100 MG capsule TAKE 1 CAPSULE BY MOUTH ONCE DAILY  Qty: 90 capsule, Refills: 0    Associated Diagnoses: Sciatica of right side; Chronic left-sided low back pain with left-sided sciatica; Fibromyalgia      !! gabapentin (NEURONTIN) 400 MG capsule Take 1 capsule (400 mg total) by mouth every evening.  Qty: 90 capsule, Refills: 1    Associated Diagnoses: Fibromyalgia      levothyroxine (SYNTHROID) 75 MCG tablet TAKE 1 TABLET DAILY BEFORE BREAKFAST  Qty: 90 tablet, Refills: 3      lisinopril (PRINIVIL,ZESTRIL) 5 MG tablet Take 1 tablet (5 mg total) by mouth once daily.  Qty: 90 tablet, Refills: 2    Associated Diagnoses: Hypertension, unspecified type; Type 2 diabetes mellitus with hyperglycemia, without long-term current use of insulin      metoprolol succinate (TOPROL-XL) 25 MG 24 hr tablet TAKE 1 TABLET EVERY DAY  Qty: 90 tablet, Refills: 0    Associated Diagnoses: Essential hypertension      topiramate (TOPAMAX) 50 MG tablet TAKE 1 TABLET ONE TIME DAILY  Qty: 90 tablet, Refills: 3      BD ALCOHOL SWABS PadM       TRUE METRIX AIR GLUCOSE METER kit       TRUE METRIX GLUCOSE TEST STRIP Strp       TRUE METRIX LEVEL 1 Soln       TRUEPLUS LANCETS 28 gauge Misc        !! - Potential duplicate medications found. Please discuss with provider.          Discharge Procedure Orders (must include Diet, Follow-up, Activity)    Discharge Procedure Orders (must include Diet, Follow-up, Activity)  Diet general     Ice to affected area     Call MD for:   temperature >100.4     Call MD for:  persistent nausea and vomiting     Call MD for:  severe uncontrolled pain     Call MD for:  difficulty breathing, headache or visual disturbances     Call MD for:  redness, tenderness, or signs of infection (pain, swelling, redness, odor or green/yellow discharge around incision site)     Call MD for:  hives     Call MD for:  persistent dizziness or light-headedness     Call MD for:  extreme fatigue     No dressing needed     Shower on day dressing removed (No bath)          Discharge Date: 8/3/18

## 2018-08-09 ENCOUNTER — PATIENT MESSAGE (OUTPATIENT)
Dept: FAMILY MEDICINE | Facility: CLINIC | Age: 68
End: 2018-08-09

## 2018-08-09 DIAGNOSIS — F32.1 CURRENT MODERATE EPISODE OF MAJOR DEPRESSIVE DISORDER WITHOUT PRIOR EPISODE: ICD-10-CM

## 2018-08-09 RX ORDER — BUPROPION HYDROCHLORIDE 300 MG/1
300 TABLET ORAL DAILY
Qty: 90 TABLET | Refills: 1 | Status: SHIPPED | OUTPATIENT
Start: 2018-08-09 | End: 2019-01-04 | Stop reason: SDUPTHER

## 2018-08-27 ENCOUNTER — OFFICE VISIT (OUTPATIENT)
Dept: PHYSICAL MEDICINE AND REHAB | Facility: CLINIC | Age: 68
End: 2018-08-27
Payer: MEDICARE

## 2018-08-27 VITALS
HEART RATE: 67 BPM | DIASTOLIC BLOOD PRESSURE: 83 MMHG | SYSTOLIC BLOOD PRESSURE: 142 MMHG | HEIGHT: 62 IN | BODY MASS INDEX: 33.13 KG/M2 | WEIGHT: 180 LBS

## 2018-08-27 DIAGNOSIS — M25.562 CHRONIC PAIN OF LEFT KNEE: ICD-10-CM

## 2018-08-27 DIAGNOSIS — M25.569 CHRONIC KNEE PAIN, UNSPECIFIED LATERALITY: Primary | ICD-10-CM

## 2018-08-27 DIAGNOSIS — G89.29 CHRONIC KNEE PAIN, UNSPECIFIED LATERALITY: Primary | ICD-10-CM

## 2018-08-27 DIAGNOSIS — G89.29 CHRONIC PAIN OF LEFT KNEE: ICD-10-CM

## 2018-08-27 PROCEDURE — 3079F DIAST BP 80-89 MM HG: CPT | Mod: CPTII,S$GLB,, | Performed by: PHYSICAL MEDICINE & REHABILITATION

## 2018-08-27 PROCEDURE — 99999 PR PBB SHADOW E&M-EST. PATIENT-LVL III: CPT | Mod: PBBFAC,,, | Performed by: PHYSICAL MEDICINE & REHABILITATION

## 2018-08-27 PROCEDURE — 3077F SYST BP >= 140 MM HG: CPT | Mod: CPTII,S$GLB,, | Performed by: PHYSICAL MEDICINE & REHABILITATION

## 2018-08-27 PROCEDURE — 99212 OFFICE O/P EST SF 10 MIN: CPT | Mod: S$GLB,,, | Performed by: PHYSICAL MEDICINE & REHABILITATION

## 2018-08-29 DIAGNOSIS — I10 ESSENTIAL HYPERTENSION: ICD-10-CM

## 2018-08-30 RX ORDER — FUROSEMIDE 20 MG/1
TABLET ORAL
Qty: 90 TABLET | Refills: 0 | Status: SHIPPED | OUTPATIENT
Start: 2018-08-30 | End: 2018-10-08 | Stop reason: SDUPTHER

## 2018-09-07 ENCOUNTER — TELEPHONE (OUTPATIENT)
Dept: ORTHOPEDICS | Facility: CLINIC | Age: 68
End: 2018-09-07

## 2018-09-07 DIAGNOSIS — M25.561 RIGHT KNEE PAIN, UNSPECIFIED CHRONICITY: Primary | ICD-10-CM

## 2018-09-07 NOTE — TELEPHONE ENCOUNTER
----- Message from Mell Whitfield sent at 9/7/2018  1:24 PM CDT -----  Contact: Patient  Patient is calling with a question about an available appointment that came across her portal for today.  Please call to discuss.  Call Back#[phone]  Thanks

## 2018-09-10 ENCOUNTER — LAB VISIT (OUTPATIENT)
Dept: LAB | Facility: HOSPITAL | Age: 68
End: 2018-09-10
Attending: ORTHOPAEDIC SURGERY
Payer: MEDICARE

## 2018-09-10 ENCOUNTER — OFFICE VISIT (OUTPATIENT)
Dept: ORTHOPEDICS | Facility: CLINIC | Age: 68
End: 2018-09-10
Payer: MEDICARE

## 2018-09-10 ENCOUNTER — HOSPITAL ENCOUNTER (OUTPATIENT)
Dept: RADIOLOGY | Facility: HOSPITAL | Age: 68
Discharge: HOME | End: 2018-09-10
Attending: ORTHOPAEDIC SURGERY
Payer: MEDICARE

## 2018-09-10 VITALS
HEART RATE: 60 BPM | BODY MASS INDEX: 33.13 KG/M2 | WEIGHT: 180 LBS | SYSTOLIC BLOOD PRESSURE: 156 MMHG | DIASTOLIC BLOOD PRESSURE: 70 MMHG | HEIGHT: 62 IN

## 2018-09-10 DIAGNOSIS — Z01.818 PREOP EXAMINATION: Primary | ICD-10-CM

## 2018-09-10 DIAGNOSIS — S83.241D ACUTE MEDIAL MENISCAL TEAR, RIGHT, SUBSEQUENT ENCOUNTER: ICD-10-CM

## 2018-09-10 DIAGNOSIS — M25.561 RIGHT KNEE PAIN, UNSPECIFIED CHRONICITY: ICD-10-CM

## 2018-09-10 DIAGNOSIS — Z01.818 PREOP EXAMINATION: ICD-10-CM

## 2018-09-10 DIAGNOSIS — S83.241D ACUTE MEDIAL MENISCUS TEAR OF RIGHT KNEE, SUBSEQUENT ENCOUNTER: Primary | ICD-10-CM

## 2018-09-10 LAB
ANION GAP SERPL CALC-SCNC: 10 MMOL/L
BASOPHILS # BLD AUTO: 0 K/UL
BASOPHILS NFR BLD: 0.6 %
BUN SERPL-MCNC: 16 MG/DL
CALCIUM SERPL-MCNC: 9.9 MG/DL
CHLORIDE SERPL-SCNC: 106 MMOL/L
CO2 SERPL-SCNC: 28 MMOL/L
CREAT SERPL-MCNC: 0.9 MG/DL
DIFFERENTIAL METHOD: ABNORMAL
EOSINOPHIL # BLD AUTO: 0.1 K/UL
EOSINOPHIL NFR BLD: 1.2 %
ERYTHROCYTE [DISTWIDTH] IN BLOOD BY AUTOMATED COUNT: 15.6 %
EST. GFR  (AFRICAN AMERICAN): >60 ML/MIN/1.73 M^2
EST. GFR  (NON AFRICAN AMERICAN): >60 ML/MIN/1.73 M^2
GLUCOSE SERPL-MCNC: 94 MG/DL
HCT VFR BLD AUTO: 36.5 %
HGB BLD-MCNC: 12 G/DL
LYMPHOCYTES # BLD AUTO: 2.1 K/UL
LYMPHOCYTES NFR BLD: 34.8 %
MCH RBC QN AUTO: 29.5 PG
MCHC RBC AUTO-ENTMCNC: 33 G/DL
MCV RBC AUTO: 89 FL
MONOCYTES # BLD AUTO: 0.4 K/UL
MONOCYTES NFR BLD: 6.4 %
NEUTROPHILS # BLD AUTO: 3.4 K/UL
NEUTROPHILS NFR BLD: 57 %
PLATELET # BLD AUTO: 201 K/UL
PMV BLD AUTO: 7.9 FL
POTASSIUM SERPL-SCNC: 4.1 MMOL/L
RBC # BLD AUTO: 4.09 M/UL
SODIUM SERPL-SCNC: 144 MMOL/L
WBC # BLD AUTO: 6 K/UL

## 2018-09-10 PROCEDURE — 99213 OFFICE O/P EST LOW 20 MIN: CPT | Mod: PBBFAC,25,PN | Performed by: ORTHOPAEDIC SURGERY

## 2018-09-10 PROCEDURE — 85025 COMPLETE CBC W/AUTO DIFF WBC: CPT

## 2018-09-10 PROCEDURE — 73562 X-RAY EXAM OF KNEE 3: CPT | Mod: 26,59,LT, | Performed by: RADIOLOGY

## 2018-09-10 PROCEDURE — 99214 OFFICE O/P EST MOD 30 MIN: CPT | Mod: 57,S$PBB,, | Performed by: ORTHOPAEDIC SURGERY

## 2018-09-10 PROCEDURE — 36415 COLL VENOUS BLD VENIPUNCTURE: CPT

## 2018-09-10 PROCEDURE — 99999 PR PBB SHADOW E&M-EST. PATIENT-LVL III: CPT | Mod: PBBFAC,,, | Performed by: ORTHOPAEDIC SURGERY

## 2018-09-10 PROCEDURE — 3077F SYST BP >= 140 MM HG: CPT | Mod: CPTII,,, | Performed by: ORTHOPAEDIC SURGERY

## 2018-09-10 PROCEDURE — 80048 BASIC METABOLIC PNL TOTAL CA: CPT

## 2018-09-10 PROCEDURE — 73564 X-RAY EXAM KNEE 4 OR MORE: CPT | Mod: 26,RT,, | Performed by: RADIOLOGY

## 2018-09-10 PROCEDURE — 3078F DIAST BP <80 MM HG: CPT | Mod: CPTII,,, | Performed by: ORTHOPAEDIC SURGERY

## 2018-09-10 PROCEDURE — 73562 X-RAY EXAM OF KNEE 3: CPT | Mod: TC,PN,LT,59

## 2018-09-10 PROCEDURE — 1101F PT FALLS ASSESS-DOCD LE1/YR: CPT | Mod: CPTII,,, | Performed by: ORTHOPAEDIC SURGERY

## 2018-09-11 PROBLEM — S83.241D ACUTE MEDIAL MENISCAL TEAR, RIGHT, SUBSEQUENT ENCOUNTER: Status: ACTIVE | Noted: 2018-09-11

## 2018-09-11 RX ORDER — CLINDAMYCIN PHOSPHATE 900 MG/50ML
900 INJECTION, SOLUTION INTRAVENOUS
Status: CANCELLED | OUTPATIENT
Start: 2018-09-11

## 2018-09-11 RX ORDER — MUPIROCIN 20 MG/G
OINTMENT TOPICAL
Status: CANCELLED | OUTPATIENT
Start: 2018-09-11

## 2018-09-11 RX ORDER — SODIUM CHLORIDE 9 MG/ML
INJECTION, SOLUTION INTRAVENOUS CONTINUOUS
Status: CANCELLED | OUTPATIENT
Start: 2018-09-11

## 2018-09-11 NOTE — PROGRESS NOTES
Past Medical History:   Diagnosis Date    Anxiety     Cataract     Depression     Diabetes mellitus     managing with diet    Dizzy     Fibromyalgia     GERD (gastroesophageal reflux disease)     History of bladder infections     Hypertension     Hypothyroid     IBS (irritable bowel syndrome)     Kidney stones     Meniere disease     Migraine     Muscle spasms of head and/or neck     and lower ext    Osteoarthritis     PONV (postoperative nausea and vomiting)     severe-cause by morphine per pt    Sleep apnea     CPAP       Past Surgical History:   Procedure Laterality Date    [id number] Left 8/3/2018    Performed by Gee [facility]ers MD at [facility] OR    ARTHROSCOPY, KNEE Right 10/15/2013    Performed by Edgar Kumar Jr., MD at [facility] OR    BLADDER SUSPENSION      BLOCK-NERVE Left 6/17/2016    Performed by G[facility]ers [facility] at [facility] OR    BLOCK-NERVE-MEDIAL BRANCH-LUMBAR Bilateral 8/1/2014    Performed by Delmar [facility] at [facility] OR    BREAST BIOPSY      CHONDRECTOMY, KNEE, SEMILUNAR CARTILAGE Right 10/15/2013    Performed by Edgar Kumar Jr., MD at [facility] OR    CHONDROPLASTY-CONDYLE Right 10/15/2013    Performed by Edgar Kumar Jr., MD at [facility] OR    COLONOSCOPY  8/27/14    Dr. Thomas, 5 year recheck    COLONOSCOPY N/A 8/27/2014    Performed by Luis [facility] at [facility] ENDO    dental implant      upper RT implant    EGD (ESOPHAGOGASTRODUODENOSCOPY) N/A 4/15/2013    Performed by Luis [facility] at [facility] ENDO    ESOPHAGOGASTRODUODENOSCOPY (EGD) N/A 6/27/2016    Performed by Luis [facility] at [facility] ENDO    EYE SURGERY      bilateral PHACO with IOL    FOOT SURGERY Bilateral 12/05/2008    bunionectomy    FRACTURE SURGERY      HYSTERECTOMY      INJECTION-STEROID-EPIDURAL-LUMBAR N/A 12/23/2014    Performed by Delmar [facility] at [facility] OR    JOINT REPLACEMENT Left     Partial knee    JOINT REPLACEMENT Left     Total knee replacement    KNEE  ARTHROSCOPY Bilateral     MANDIBLE FRACTURE SURGERY      MULTIPLE TOOTH EXTRACTIONS      radiofrequency ablation of the genicular branches of the left knee (4) Left 8/12/2016    Performed by Gee [facility]ers [facility] at [facility] OR    RADIOFREQUENCY THERMOCOAGULATION (RFTC)-NERVE-MEDIAN BRANCH-LUMBAR Bilateral 2/16/2018    Performed by [facility] at [facility] OR    RADIOFREQUENCY THERMOCOAGULATION (RFTC)-NERVE-MEDIAN BRANCH-LUMBAR Bilateral 8/8/2014    Performed by Delmar [facility]as MD at [facility] OR    revision of mesh      repair to bladder suspension    SYNOVECTOMY, KNEE Right 10/15/2013    Performed by Edgar Kumar Jr., MD at [facility] OR    TONSILLECTOMY, ADENOIDECTOMY      UPPER GASTROINTESTINAL ENDOSCOPY      VAGINAL DELIVERY      times 2       Current Outpatient Medications   Medication Sig    atorvastatin (LIPITOR) 40 MG tablet TAKE 1 TABLET EVERY DAY    b complex vitamins tablet Take 1 tablet by mouth once daily.    BD ALCOHOL SWABS PadM     buPROPion (WELLBUTRIN XL) 300 MG 24 hr tablet Take 1 tablet (300 mg total) by mouth once daily.    chlordiazepoxide-clidinium 5-2.5 mg (LIBRAX) 5-2.5 mg Cap Take by mouth. 2 qam, 1 q noon, 1 qhs    DULoxetine (CYMBALTA) 60 MG capsule TAKE 1 CAPSULE TWICE DAILY    ergocalciferol (VITAMIN D2) 50,000 unit Cap Take 50,000 Units by mouth every 7 days.    esomeprazole (NEXIUM) 40 MG capsule TAKE 1 CAPSULE EVERY DAY    furosemide (LASIX) 20 MG tablet TAKE 1 TABLET EVERY DAY    gabapentin (NEURONTIN) 100 MG capsule TAKE 1 CAPSULE BY MOUTH ONCE DAILY    gabapentin (NEURONTIN) 400 MG capsule Take 1 capsule (400 mg total) by mouth every evening.    levothyroxine (SYNTHROID) 75 MCG tablet TAKE 1 TABLET DAILY BEFORE BREAKFAST    lisinopril (PRINIVIL,ZESTRIL) 5 MG tablet Take 1 tablet (5 mg total) by mouth once daily.    metoprolol succinate (TOPROL-XL) 25 MG 24 hr tablet TAKE 1 TABLET EVERY DAY    topiramate (TOPAMAX) 50 MG tablet TAKE 1 TABLET ONE TIME DAILY    TRUE METRIX  AIR GLUCOSE METER kit     TRUE METRIX GLUCOSE TEST STRIP Strp     TRUE METRIX LEVEL 1 Soln     TRUEPLUS LANCETS 28 gauge Misc      No current facility-administered medications for this visit.        Allergies   Allergen Reactions    Sulfa (Sulfonamide Antibiotics) Hives, Itching and Rash    Penicillins      Other reaction(s): Unknown. Childhood reaction. Pt does not remember reaction.    Adhesive Itching and Rash     Paper tape OK    Garlic Diarrhea    Morphine Nausea And Vomiting       Family History   Problem Relation Age of Onset    Diabetes Mother     Heart disease Mother     Kidney disease Mother     Hypertension Mother     Hyperlipidemia Mother     Diabetes Mellitus Mother     Heart disease Father     Diabetes Father     Hypertension Father     Hyperlipidemia Father     Diabetes Mellitus Father     COPD Father     Heart disease Brother     Cancer Brother     Early death Brother     Stomach cancer Brother     Breast cancer Maternal Aunt     Heart disease Brother     Ovarian cancer Neg Hx     Cataracts Neg Hx     Glaucoma Neg Hx     Macular degeneration Neg Hx     Retinal detachment Neg Hx     Thyroid disease Neg Hx     Stroke Neg Hx     Rheum arthritis Neg Hx     Psoriasis Neg Hx     Osteoarthritis Neg Hx     Lupus Neg Hx     Inflammatory bowel disease Neg Hx     Depression Neg Hx     Chronic back pain Neg Hx     Asthma Neg Hx        Social History     Socioeconomic History    Marital status:      Spouse name: Not on file    Number of children: Not on file    Years of education: Not on file    Highest education level: Not on file   Social Needs    Financial resource strain: Not on file    Food insecurity - worry: Not on file    Food insecurity - inability: Not on file    Transportation needs - medical: Not on file    Transportation needs - non-medical: Not on file   Occupational History    Not on file   Tobacco Use    Smoking status: Former Smoker      Packs/day: 0.25     Years: 1.00     Pack years: 0.25     Last attempt to quit: 10/15/1966     Years since quittin.9    Smokeless tobacco: Never Used   Substance and Sexual Activity    Alcohol use: Yes     Comment: occasionally    Drug use: No    Sexual activity: Not Currently     Partners: Male     Birth control/protection: Post-menopausal, Surgical   Other Topics Concern    Not on file   Social History Narrative    Not on file       Chief Complaint:   Chief Complaint   Patient presents with    Right Knee - Pain       Interval history: This is a 68-year-old female seen for a painful left total knee replacement.  The left total knee was replaced in 2013  This was done by Dr. Kumar.  Patient previously had had a left partial knee replacement.  Patient has always had pain.  Difficulty extending her knee.  Pain with walking, sitting, or bending.  No treatment since her surgery.  No history of infections.  Rates her pain as a 7/10.  Symptoms are stable and moderate.  Patient has not seen Dr. Kumar in several years.  She also complains of right knee pain.  Pain when bending or walking.  She had a history of a scope.  No recent treatment.  Pain below the kneecap and in the back of the knee.   Patient has a history of fibromyalgia.The MRI showed a lot of arthritic change.  Some degenerative tearing of the anterior cruciate ligament and both menisci.  She does not have full-thickness cartilage loss however.     Present illness:  She comes back in with the more right knee pain.  Knee feels like it gives way at times.  The injections that we did previously helped very temporarily.  Pain is back up to an 8/10.    Review of Systems:  Musculoskeletal:  See HPI      Physical Examination:    Vital Signs:    Vitals:    09/10/18 1441   BP: (!) 156/70   Pulse: 60       This a well-developed, well nourished patient in no acute distress.  They are alert and oriented and cooperative to examination.  Pt. walks  without an antalgic gait.      Examination of the right knee shows no rashes or erythema. There are no masses ecchymosis or effusion. Patient has full range of motion from 0-110°. Patient is moderately tender to palpation over lateral joint line and mildly tender to palpation over the medial joint line. Patient has a - Lachman exam, - anterior drawer exam, and - posterior drawer exam.Knee is stable to varus and valgus stress. 5 out of 5 motor strength. Palpable distal pulses. Intact light touch sensation. Negative Patellofemoral crepitus    Heart is regular rate without obvious murmurs   Normal respiratory effort without audible wheezing  Abdomen is soft and nontender     X-rays: 4 views of right knee is ordered and  reviewed which show left total knee arthroplasty components in good position without signs of fracture or loosening.  Moderate arthritic changes on the right knee.  No significant progression from x-rays a couple years ago.    X-rays of the left hip are reviewed which show well-maintained joint space    MRI of the right knee:Degenerative change, cartilage thinning and loss more so lateral compartment of the knee with tears of the medial and lateral meniscus, tear of anterior cruciate ligament, joint effusion, popliteal cyst.     Assessment::Painful left total knee  Right knee medial and lateral meniscal tears.    Plan:  I reviewed the findings with her today.  We talked about knee arthroscopy.  Patient would like to proceed with this.  She has tried injections both cortisone and HA. She had a bad experience with a knee replacement would like to try and avoid that.  Plan is for right knee arthroscopy with partial medial lateral meniscectomies and chondroplasties as indicated. Risks, benefits, and alternatives to the procedure were explained to the patient including but not limited to damage to nerves, arteries, blood vessels, bones, tendons, ligaments, stiffness, instability, infection, DVT, PE, as well  as general anesthetic complications including seizure, stroke, heart attack and even death. The patient understood these risks and wished to proceed and signed the informed consent.       We performed a custom orthotic/brace fitting, adjusting and training with the patient. The patient demonstrated understanding and proper care. This was performed for 15 minutes.

## 2018-09-11 NOTE — H&P (VIEW-ONLY)
Past Medical History:   Diagnosis Date    Anxiety     Cataract     Depression     Diabetes mellitus     managing with diet    Dizzy     Fibromyalgia     GERD (gastroesophageal reflux disease)     History of bladder infections     Hypertension     Hypothyroid     IBS (irritable bowel syndrome)     Kidney stones     Meniere disease     Migraine     Muscle spasms of head and/or neck     and lower ext    Osteoarthritis     PONV (postoperative nausea and vomiting)     severe-cause by morphine per pt    Sleep apnea     CPAP       Past Surgical History:   Procedure Laterality Date    [id number] Left 8/3/2018    Performed by Gee Byers MD at [facility] OR    ARTHROSCOPY, KNEE Right 10/15/2013    Performed by Edgar Kumar Jr., MD at [facility] OR    BLADDER SUSPENSION      BLOCK-NERVE Left 6/17/2016    Performed by Gee Byers MD at [facility] OR    BLOCK-NERVE-MEDIAL BRANCH-LUMBAR Bilateral 8/1/2014    [facility] by [facility] at [facility] OR    BREAST BIOPSY      CHONDRECTOMY, KNEE, SEMILUNAR CARTILAGE Right 10/15/2013    Performed by Edgar Kumar Jr., MD at [facility] OR    CHONDROPLASTY-CONDYLE Right 10/15/2013    Performed by Edgar Kumar Jr., MD at [facility] OR    COLONOSCOPY  8/27/14    Dr. Thomas, 5 year recheck    COLONOSCOPY N/A 8/27/2014    [facility]    dental implant      upper RT implant    EGD (ESOPHAGOGASTRODUODENOSCOPY) N/A 4/15/2013    [facility]    ESOPHAGOGASTRODUODENOSCOPY (EGD) N/A 6/27/2016    [facility]    EYE SURGERY      bilateral PHACO with IOL    FOOT SURGERY Bilateral 12/05/2008    bunionectomy    FRACTURE SURGERY      HYSTERECTOMY      INJECTION-STEROID-EPIDURAL-LUMBAR N/A 12/23/2014    [facility] at [facility]    JOINT REPLACEMENT Left     Partial knee    JOINT REPLACEMENT Left     Total knee replacement    KNEE  ARTHROSCOPY Bilateral     MANDIBLE FRACTURE SURGERY      MULTIPLE TOOTH EXTRACTIONS      radiofrequency ablation of the genicular branches of the left knee (4) Left 8/12/2016    [facility]formed by [facility] at [facility] OR    RADIOFREQUENCY THERMOCOAGULATION (RFTC)-NERVE-MEDIAN BRANCH-LUMBAR Bilateral 2/16/2018    [facility] at [facility]    RADIOFREQUENCY THERMOCOAGULATION (RFTC)-NERVE-MEDIAN BRANCH-LUMBAR Bilateral 8/8/2014    Performed by [facility] at [facility] OR    revision of mesh      repair to bladder suspension    SYNOVECTOMY, KNEE Right 10/15/2013    Performed by Edgar [facility] Jr., MD at [facility] OR    TONSILLECTOMY, ADENOIDECTOMY      UPPER GASTROINTESTINAL ENDOSCOPY      VAGINAL DELIVERY      times 2       Current Outpatient Medications   Medication Sig    atorvastatin (LIPITOR) 40 MG tablet TAKE 1 TABLET EVERY DAY    b complex vitamins tablet Take 1 tablet by mouth once daily.    BD ALCOHOL SWABS PadM     buPROPion (WELLBUTRIN XL) 300 MG 24 hr tablet Take 1 tablet (300 mg total) by mouth once daily.    chlordiazepoxide-clidinium 5-2.5 mg (LIBRAX) 5-2.5 mg Cap Take by mouth. 2 qam, 1 q noon, 1 qhs    DULoxetine (CYMBALTA) 60 MG capsule TAKE 1 CAPSULE TWICE DAILY    ergocalciferol (VITAMIN D2) 50,000 unit Cap Take 50,000 Units by mouth every 7 days.    esomeprazole (NEXIUM) 40 MG capsule TAKE 1 CAPSULE EVERY DAY    furosemide (LASIX) 20 MG tablet TAKE 1 TABLET EVERY DAY    gabapentin (NEURONTIN) 100 MG capsule TAKE 1 CAPSULE BY MOUTH ONCE DAILY    gabapentin (NEURONTIN) 400 MG capsule Take 1 capsule (400 mg total) by mouth every evening.    levothyroxine (SYNTHROID) 75 MCG tablet TAKE 1 TABLET DAILY BEFORE BREAKFAST    lisinopril (PRINIVIL,ZESTRIL) 5 MG tablet Take 1 tablet (5 mg total) by mouth once daily.    metoprolol succinate (TOPROL-XL) 25 MG 24 hr tablet TAKE 1 TABLET EVERY DAY    topiramate (TOPAMAX) 50 MG tablet TAKE 1 TABLET ONE TIME DAILY    TRUE METRIX  AIR GLUCOSE METER kit     TRUE METRIX GLUCOSE TEST STRIP Strp     TRUE METRIX LEVEL 1 Soln     TRUEPLUS LANCETS 28 gauge Misc      No current facility-administered medications for this visit.        Allergies   Allergen Reactions    Sulfa (Sulfonamide Antibiotics) Hives, Itching and Rash    Penicillins      Other reaction(s): Unknown. Childhood reaction. Pt does not remember reaction.    Adhesive Itching and Rash     Paper tape OK    Garlic Diarrhea    Morphine Nausea And Vomiting       Family History   Problem Relation Age of Onset    Diabetes Mother     Heart disease Mother     Kidney disease Mother     Hypertension Mother     Hyperlipidemia Mother     Diabetes Mellitus Mother     Heart disease Father     Diabetes Father     Hypertension Father     Hyperlipidemia Father     Diabetes Mellitus Father     COPD Father     Heart disease Brother     Cancer Brother     Early death Brother     Stomach cancer Brother     Breast cancer Maternal Aunt     Heart disease Brother     Ovarian cancer Neg Hx     Cataracts Neg Hx     Glaucoma Neg Hx     Macular degeneration Neg Hx     Retinal detachment Neg Hx     Thyroid disease Neg Hx     Stroke Neg Hx     Rheum arthritis Neg Hx     Psoriasis Neg Hx     Osteoarthritis Neg Hx     Lupus Neg Hx     Inflammatory bowel disease Neg Hx     Depression Neg Hx     Chronic back pain Neg Hx     Asthma Neg Hx        Social History     Socioeconomic History    Marital status:      Spouse name: Not on file    Number of children: Not on file    Years of education: Not on file    Highest education level: Not on file   Social Needs    Financial resource strain: Not on file    Food insecurity - worry: Not on file    Food insecurity - inability: Not on file    Transportation needs - medical: Not on file    Transportation needs - non-medical: Not on file   Occupational History    Not on file   Tobacco Use    Smoking status: Former Smoker      Packs/day: 0.25     Years: 1.00     Pack years: 0.25     Last attempt to quit: 10/15/1966     Years since quittin.9    Smokeless tobacco: Never Used   Substance and Sexual Activity    Alcohol use: Yes     Comment: occasionally    Drug use: No    Sexual activity: Not Currently     Partners: Male     Birth control/protection: Post-menopausal, Surgical   Other Topics Concern    Not on file   Social History Narrative    Not on file       Chief Complaint:   Chief Complaint   Patient presents with    Right Knee - Pain       Interval history: This is a 68-year-old female seen for a painful left total knee replacement.  The left total knee was replaced in 2013  This was done by Dr. Kumar.  Patient previously had had a left partial knee replacement.  Patient has always had pain.  Difficulty extending her knee.  Pain with walking, sitting, or bending.  No treatment since her surgery.  No history of infections.  Rates her pain as a 7/10.  Symptoms are stable and moderate.  Patient has not seen Dr. Kumar in several years.  She also complains of right knee pain.  Pain when bending or walking.  She had a history of a scope.  No recent treatment.  Pain below the kneecap and in the back of the knee.   Patient has a history of fibromyalgia.The MRI showed a lot of arthritic change.  Some degenerative tearing of the anterior cruciate ligament and both menisci.  She does not have full-thickness cartilage loss however.     Present illness:  She comes back in with the more right knee pain.  Knee feels like it gives way at times.  The injections that we did previously helped very temporarily.  Pain is back up to an 8/10.    Review of Systems:  Musculoskeletal:  See HPI      Physical Examination:    Vital Signs:    Vitals:    09/10/18 1441   BP: (!) 156/70   Pulse: 60       This a well-developed, well nourished patient in no acute distress.  They are alert and oriented and cooperative to examination.  Pt. walks  without an antalgic gait.      Examination of the right knee shows no rashes or erythema. There are no masses ecchymosis or effusion. Patient has full range of motion from 0-110°. Patient is moderately tender to palpation over lateral joint line and mildly tender to palpation over the medial joint line. Patient has a - Lachman exam, - anterior drawer exam, and - posterior drawer exam.Knee is stable to varus and valgus stress. 5 out of 5 motor strength. Palpable distal pulses. Intact light touch sensation. Negative Patellofemoral crepitus    Heart is regular rate without obvious murmurs   Normal respiratory effort without audible wheezing  Abdomen is soft and nontender     X-rays: 4 views of right knee is ordered and  reviewed which show left total knee arthroplasty components in good position without signs of fracture or loosening.  Moderate arthritic changes on the right knee.  No significant progression from x-rays a couple years ago.    X-rays of the left hip are reviewed which show well-maintained joint space    MRI of the right knee:Degenerative change, cartilage thinning and loss more so lateral compartment of the knee with tears of the medial and lateral meniscus, tear of anterior cruciate ligament, joint effusion, popliteal cyst.     Assessment::Painful left total knee  Right knee medial and lateral meniscal tears.    Plan:  I reviewed the findings with her today.  We talked about knee arthroscopy.  Patient would like to proceed with this.  She has tried injections both cortisone and HA. She had a bad experience with a knee replacement would like to try and avoid that.  Plan is for right knee arthroscopy with partial medial lateral meniscectomies and chondroplasties as indicated. Risks, benefits, and alternatives to the procedure were explained to the patient including but not limited to damage to nerves, arteries, blood vessels, bones, tendons, ligaments, stiffness, instability, infection, DVT, PE, as well  as general anesthetic complications including seizure, stroke, heart attack and even death. The patient understood these risks and wished to proceed and signed the informed consent.       We performed a custom orthotic/brace fitting, adjusting and training with the patient. The patient demonstrated understanding and proper care. This was performed for 15 minutes.

## 2018-09-17 ENCOUNTER — ANESTHESIA EVENT (OUTPATIENT)
Dept: SURGERY | Facility: HOSPITAL | Age: 68
End: 2018-09-17
Payer: MEDICARE

## 2018-09-17 NOTE — OR NURSING
PAT phone interview complete. Spoke with pt. Discussed meds, npo status, need for  and need for antibacterial shower x2. Voiced understanding.

## 2018-09-18 ENCOUNTER — ANESTHESIA (OUTPATIENT)
Dept: SURGERY | Facility: HOSPITAL | Age: 68
End: 2018-09-18
Payer: MEDICARE

## 2018-09-18 ENCOUNTER — HOSPITAL ENCOUNTER (OUTPATIENT)
Facility: HOSPITAL | Age: 68
Discharge: HOME | End: 2018-09-18
Attending: ORTHOPAEDIC SURGERY | Admitting: ORTHOPAEDIC SURGERY
Payer: MEDICARE

## 2018-09-18 DIAGNOSIS — S83.241D ACUTE MEDIAL MENISCAL TEAR, RIGHT, SUBSEQUENT ENCOUNTER: ICD-10-CM

## 2018-09-18 DIAGNOSIS — S83.241D ACUTE MEDIAL MENISCUS TEAR OF RIGHT KNEE, SUBSEQUENT ENCOUNTER: ICD-10-CM

## 2018-09-18 PROCEDURE — 63600175 PHARM REV CODE 636 W HCPCS: Performed by: NURSE ANESTHETIST, CERTIFIED REGISTERED

## 2018-09-18 PROCEDURE — 25000003 PHARM REV CODE 250: Performed by: ORTHOPAEDIC SURGERY

## 2018-09-18 PROCEDURE — 76942 ECHO GUIDE FOR BIOPSY: CPT | Mod: 26,,, | Performed by: ANESTHESIOLOGY

## 2018-09-18 PROCEDURE — 27201423 OPTIME MED/SURG SUP & DEVICES STERILE SUPPLY: Performed by: ORTHOPAEDIC SURGERY

## 2018-09-18 PROCEDURE — 37000008 HC ANESTHESIA 1ST 15 MINUTES: Performed by: ORTHOPAEDIC SURGERY

## 2018-09-18 PROCEDURE — 36000710: Performed by: ORTHOPAEDIC SURGERY

## 2018-09-18 PROCEDURE — 37000009 HC ANESTHESIA EA ADD 15 MINS: Performed by: ORTHOPAEDIC SURGERY

## 2018-09-18 PROCEDURE — 25000003 PHARM REV CODE 250: Performed by: ANESTHESIOLOGY

## 2018-09-18 PROCEDURE — 71000039 HC RECOVERY, EACH ADD'L HOUR: Performed by: ORTHOPAEDIC SURGERY

## 2018-09-18 PROCEDURE — S0077 INJECTION, CLINDAMYCIN PHOSP: HCPCS | Performed by: ORTHOPAEDIC SURGERY

## 2018-09-18 PROCEDURE — 71000015 HC POSTOP RECOV 1ST HR: Performed by: ORTHOPAEDIC SURGERY

## 2018-09-18 PROCEDURE — 99900104 DSU ONLY-NO CHARGE-EA ADD'L HR (STAT): Performed by: ORTHOPAEDIC SURGERY

## 2018-09-18 PROCEDURE — D9220A PRA ANESTHESIA: Mod: CRNA,,, | Performed by: NURSE ANESTHETIST, CERTIFIED REGISTERED

## 2018-09-18 PROCEDURE — D9220A PRA ANESTHESIA: Mod: ANES,,, | Performed by: ANESTHESIOLOGY

## 2018-09-18 PROCEDURE — 71000016 HC POSTOP RECOV ADDL HR: Performed by: ORTHOPAEDIC SURGERY

## 2018-09-18 PROCEDURE — S0020 INJECTION, BUPIVICAINE HYDRO: HCPCS | Performed by: ANESTHESIOLOGY

## 2018-09-18 PROCEDURE — 36000711: Performed by: ORTHOPAEDIC SURGERY

## 2018-09-18 PROCEDURE — 99900103 DSU ONLY-NO CHARGE-INITIAL HR (STAT): Performed by: ORTHOPAEDIC SURGERY

## 2018-09-18 PROCEDURE — 27200651 HC AIRWAY, LMA: Performed by: NURSE ANESTHETIST, CERTIFIED REGISTERED

## 2018-09-18 PROCEDURE — 64447 NJX AA&/STRD FEMORAL NRV IMG: CPT | Performed by: ANESTHESIOLOGY

## 2018-09-18 PROCEDURE — 63600175 PHARM REV CODE 636 W HCPCS

## 2018-09-18 PROCEDURE — 29880 ARTHRS KNE SRG MNISECTMY M&L: CPT | Mod: RT,,, | Performed by: ORTHOPAEDIC SURGERY

## 2018-09-18 PROCEDURE — 63600175 PHARM REV CODE 636 W HCPCS: Performed by: ANESTHESIOLOGY

## 2018-09-18 PROCEDURE — 64447 NJX AA&/STRD FEMORAL NRV IMG: CPT | Mod: 59,RT,, | Performed by: ANESTHESIOLOGY

## 2018-09-18 PROCEDURE — 71000033 HC RECOVERY, INTIAL HOUR: Performed by: ORTHOPAEDIC SURGERY

## 2018-09-18 RX ORDER — KETOROLAC TROMETHAMINE 30 MG/ML
INJECTION, SOLUTION INTRAMUSCULAR; INTRAVENOUS
Status: DISCONTINUED | OUTPATIENT
Start: 2018-09-18 | End: 2018-09-18

## 2018-09-18 RX ORDER — PROPOFOL 10 MG/ML
VIAL (ML) INTRAVENOUS
Status: DISCONTINUED | OUTPATIENT
Start: 2018-09-18 | End: 2018-09-18

## 2018-09-18 RX ORDER — SODIUM CHLORIDE, SODIUM LACTATE, POTASSIUM CHLORIDE, CALCIUM CHLORIDE 600; 310; 30; 20 MG/100ML; MG/100ML; MG/100ML; MG/100ML
INJECTION, SOLUTION INTRAVENOUS CONTINUOUS
Status: DISCONTINUED | OUTPATIENT
Start: 2018-09-18 | End: 2018-09-18 | Stop reason: HOSPADM

## 2018-09-18 RX ORDER — CLINDAMYCIN PHOSPHATE 900 MG/50ML
900 INJECTION, SOLUTION INTRAVENOUS
Status: COMPLETED | OUTPATIENT
Start: 2018-09-18 | End: 2018-09-18

## 2018-09-18 RX ORDER — DEXAMETHASONE SODIUM PHOSPHATE 4 MG/ML
INJECTION, SOLUTION INTRA-ARTICULAR; INTRALESIONAL; INTRAMUSCULAR; INTRAVENOUS; SOFT TISSUE
Status: DISCONTINUED | OUTPATIENT
Start: 2018-09-18 | End: 2018-09-18

## 2018-09-18 RX ORDER — LIDOCAINE HCL/PF 100 MG/5ML
SYRINGE (ML) INTRAVENOUS
Status: DISCONTINUED | OUTPATIENT
Start: 2018-09-18 | End: 2018-09-18

## 2018-09-18 RX ORDER — HYDROCODONE BITARTRATE AND ACETAMINOPHEN 5; 325 MG/1; MG/1
1 TABLET ORAL EVERY 6 HOURS PRN
Qty: 40 TABLET | Refills: 0 | Status: SHIPPED | OUTPATIENT
Start: 2018-09-18 | End: 2018-09-28

## 2018-09-18 RX ORDER — BUPIVACAINE HCL/EPINEPHRINE 0.25-.0005
VIAL (ML) INJECTION
Status: DISCONTINUED | OUTPATIENT
Start: 2018-09-18 | End: 2018-09-18 | Stop reason: HOSPADM

## 2018-09-18 RX ORDER — FENTANYL CITRATE 50 UG/ML
INJECTION, SOLUTION INTRAMUSCULAR; INTRAVENOUS
Status: DISCONTINUED | OUTPATIENT
Start: 2018-09-18 | End: 2018-09-18

## 2018-09-18 RX ORDER — OXYCODONE HYDROCHLORIDE 5 MG/1
5 TABLET ORAL
Status: DISCONTINUED | OUTPATIENT
Start: 2018-09-18 | End: 2018-09-18 | Stop reason: HOSPADM

## 2018-09-18 RX ORDER — MIDAZOLAM HYDROCHLORIDE 1 MG/ML
INJECTION, SOLUTION INTRAMUSCULAR; INTRAVENOUS
Status: DISCONTINUED | OUTPATIENT
Start: 2018-09-18 | End: 2018-09-18

## 2018-09-18 RX ORDER — ONDANSETRON HYDROCHLORIDE 2 MG/ML
INJECTION, SOLUTION INTRAMUSCULAR; INTRAVENOUS
Status: DISCONTINUED | OUTPATIENT
Start: 2018-09-18 | End: 2018-09-18

## 2018-09-18 RX ORDER — MUPIROCIN 20 MG/G
OINTMENT TOPICAL
Status: DISCONTINUED | OUTPATIENT
Start: 2018-09-18 | End: 2018-09-18 | Stop reason: HOSPADM

## 2018-09-18 RX ORDER — SODIUM CHLORIDE 0.9 % (FLUSH) 0.9 %
3 SYRINGE (ML) INJECTION
Status: DISCONTINUED | OUTPATIENT
Start: 2018-09-18 | End: 2018-09-18 | Stop reason: HOSPADM

## 2018-09-18 RX ORDER — LIDOCAINE HYDROCHLORIDE 10 MG/ML
1 INJECTION, SOLUTION EPIDURAL; INFILTRATION; INTRACAUDAL; PERINEURAL ONCE
Status: COMPLETED | OUTPATIENT
Start: 2018-09-18 | End: 2018-09-18

## 2018-09-18 RX ORDER — SODIUM CHLORIDE 0.9 % (FLUSH) 0.9 %
3 SYRINGE (ML) INJECTION EVERY 8 HOURS
Status: DISCONTINUED | OUTPATIENT
Start: 2018-09-18 | End: 2018-09-18 | Stop reason: HOSPADM

## 2018-09-18 RX ORDER — BUPIVACAINE HYDROCHLORIDE 5 MG/ML
INJECTION, SOLUTION EPIDURAL; INTRACAUDAL
Status: DISCONTINUED | OUTPATIENT
Start: 2018-09-18 | End: 2018-09-18

## 2018-09-18 RX ORDER — SODIUM CHLORIDE, SODIUM LACTATE, POTASSIUM CHLORIDE, CALCIUM CHLORIDE 600; 310; 30; 20 MG/100ML; MG/100ML; MG/100ML; MG/100ML
10 INJECTION, SOLUTION INTRAVENOUS CONTINUOUS
Status: DISCONTINUED | OUTPATIENT
Start: 2018-09-19 | End: 2018-09-18 | Stop reason: HOSPADM

## 2018-09-18 RX ORDER — FENTANYL CITRATE 50 UG/ML
25 INJECTION, SOLUTION INTRAMUSCULAR; INTRAVENOUS EVERY 5 MIN PRN
Status: DISCONTINUED | OUTPATIENT
Start: 2018-09-18 | End: 2018-09-18 | Stop reason: HOSPADM

## 2018-09-18 RX ADMIN — FENTANYL CITRATE 100 MCG: 50 INJECTION, SOLUTION INTRAMUSCULAR; INTRAVENOUS at 07:09

## 2018-09-18 RX ADMIN — BUPIVACAINE HYDROCHLORIDE 10 ML: 5 INJECTION, SOLUTION EPIDURAL; INTRACAUDAL; PERINEURAL at 04:09

## 2018-09-18 RX ADMIN — KETOROLAC TROMETHAMINE 30 MG: 30 INJECTION, SOLUTION INTRAMUSCULAR; INTRAVENOUS at 09:09

## 2018-09-18 RX ADMIN — ONDANSETRON 4 MG: 2 INJECTION, SOLUTION INTRAMUSCULAR; INTRAVENOUS at 08:09

## 2018-09-18 RX ADMIN — LIDOCAINE HYDROCHLORIDE 50 MG: 20 INJECTION, SOLUTION INTRAVENOUS at 08:09

## 2018-09-18 RX ADMIN — FENTANYL CITRATE 25 MCG: 50 INJECTION INTRAMUSCULAR; INTRAVENOUS at 09:09

## 2018-09-18 RX ADMIN — CLINDAMYCIN PHOSPHATE 900 MG: 18 INJECTION, SOLUTION INTRAVENOUS at 08:09

## 2018-09-18 RX ADMIN — SODIUM CHLORIDE, SODIUM LACTATE, POTASSIUM CHLORIDE, AND CALCIUM CHLORIDE: .6; .31; .03; .02 INJECTION, SOLUTION INTRAVENOUS at 09:09

## 2018-09-18 RX ADMIN — MUPIROCIN: 20 OINTMENT TOPICAL at 07:09

## 2018-09-18 RX ADMIN — DEXAMETHASONE SODIUM PHOSPHATE 4 MG: 4 INJECTION, SOLUTION INTRAMUSCULAR; INTRAVENOUS at 08:09

## 2018-09-18 RX ADMIN — OXYCODONE HYDROCHLORIDE 5 MG: 5 TABLET ORAL at 09:09

## 2018-09-18 RX ADMIN — SODIUM CHLORIDE, SODIUM LACTATE, POTASSIUM CHLORIDE, AND CALCIUM CHLORIDE 10 ML/HR: .6; .31; .03; .02 INJECTION, SOLUTION INTRAVENOUS at 07:09

## 2018-09-18 RX ADMIN — MIDAZOLAM 2 MG: 1 INJECTION INTRAMUSCULAR; INTRAVENOUS at 07:09

## 2018-09-18 RX ADMIN — LIDOCAINE HYDROCHLORIDE 10 MG: 10 INJECTION, SOLUTION EPIDURAL; INFILTRATION; INTRACAUDAL; PERINEURAL at 06:09

## 2018-09-18 RX ADMIN — PROPOFOL 150 MG: 10 INJECTION, EMULSION INTRAVENOUS at 08:09

## 2018-09-18 NOTE — ANESTHESIA PROCEDURE NOTES
Peripheral    Patient location during procedure: pre-op   Block not for primary anesthetic.  Reason for block: at surgeon's request and post-op pain management   Post-op Pain Location: right knee   Start time: 9/18/2018 7:30 AM  Timeout: 9/18/2018 7:30 AM   End time: 9/18/2018 7:40 AM  Staffing  Anesthesiologist: Imtiaz Alvarado MD  Performed: anesthesiologist   Preanesthetic Checklist  Completed: patient identified, site marked, surgical consent, pre-op evaluation, timeout performed, IV checked, risks and benefits discussed and monitors and equipment checked  Peripheral Block  Patient position: supine  Prep: ChloraPrep  Patient monitoring: heart rate, cardiac monitor, continuous pulse ox, continuous capnometry and frequent blood pressure checks  Block type: adductor canal  Laterality: right  Injection technique: single shot  Needle  Needle type: Stimuplex   Needle gauge: 21 G  Needle length: 4 in  Needle localization: anatomical landmarks and ultrasound guidance   -ultrasound image captured on disc.  Assessment  Injection assessment: negative aspiration, negative parasthesia and local visualized surrounding nerve  Paresthesia pain: none  Heart rate change: no  Slow fractionated injection: yes  Additional Notes  +Exparel 1.3% 20cc.  VSS.  [facility] RN monitoring vitals throughout procedure.  Patient tolerated procedure well.

## 2018-09-18 NOTE — ANESTHESIA PREPROCEDURE EVALUATION
09/18/2018  Franca Coffey is a 68 y.o., female.    Pre-op Assessment    I have reviewed the Patient Summary Reports.     I have reviewed the Nursing Notes.   I have reviewed the Medications.     Review of Systems  Anesthesia Hx:  Hx of Anesthetic complications PONV   Cardiovascular:   Hypertension, well controlled CORONADO    Pulmonary:   Shortness of breath Sleep Apnea, CPAP    Renal/:   Chronic Renal Disease    Hepatic/GI:   GERD    Musculoskeletal:   Arthritis     Neurological:   Neuromuscular Disease, Headaches    Endocrine:   Diabetes, well controlled, type 2 Hypothyroidism    Psych:   Psychiatric History          Physical Exam  General:  Obesity    Airway/Jaw/Neck:  Airway Findings: Mouth Opening: Normal Tongue: Normal  General Airway Assessment: Adult  Mallampati: II        Eyes/Ears/Nose:  EYES/EARS/NOSE FINDINGS: Normal   Dental:  DENTAL FINDINGS: Normal   Chest/Lungs:  Chest/Lungs Findings: Clear to auscultation, Normal Respiratory Rate     Heart/Vascular:  Heart Findings: Rate: Normal  Rhythm: Regular Rhythm        Mental Status:  Mental Status Findings:  Cooperative, Alert and Oriented         Anesthesia Plan  Type of Anesthesia, risks & benefits discussed:  Anesthesia Type:  general  Patient's Preference:   Intra-op Monitoring Plan: standard ASA monitors  Intra-op Monitoring Plan Comments:   Post Op Pain Control Plan: IV/PO Opioids PRN  Post Op Pain Control Plan Comments:   Induction:   IV  Beta Blocker:  Patient is not currently on a Beta-Blocker (No further documentation required).       Informed Consent: Patient understands risks and agrees with Anesthesia plan.  Questions answered. Anesthesia consent signed with patient.  ASA Score: 2     Day of Surgery Review of History & Physical:    H&P update referred to the surgeon.         Ready For Surgery From Anesthesia Perspective.

## 2018-09-18 NOTE — PLAN OF CARE
Pt. Is AAOx4, slightly anxious,cooperative.  Breathing is unlabored on room air.  Pt. States she does use CPAP machine at home.  VS herminio.  RN reviewed plan of care with pt. And spouse who both voiced understanding.  Questions answered, comfort assured.  Prepared for surgery.

## 2018-09-18 NOTE — INTERVAL H&P NOTE
The patient has been examined and the H&P has been reviewed:    I concur with the findings and no changes have occurred since H&P was written.    Anesthesia/Surgery risks, benefits and alternative options discussed and understood by patient/family.          Active Hospital Problems    Diagnosis  POA    Acute medial meniscal tear, right, subsequent encounter [S81.126M]  Not Applicable      Resolved Hospital Problems   No resolved problems to display.

## 2018-09-18 NOTE — TRANSFER OF CARE
"Anesthesia Transfer of Care Note    Patient: Franca Coffey    Procedure(s) Performed: Procedure(s) (LRB):  ARTHROSCOPY, KNEE, WITH MENISCECTOMY (Right)  MENISCECTOMY, KNEE, MEDIAL (Right)    Patient location: PACU    Anesthesia Type: general    Transport from OR: Transported from OR on 2-3 L/min O2 by NC with adequate spontaneous ventilation    Post pain: adequate analgesia    Post assessment: no apparent anesthetic complications and tolerated procedure well    Post vital signs: stable    Level of consciousness: awake, alert and oriented    Nausea/Vomiting: no nausea/vomiting    Complications: none    Transfer of care protocol was followed      Last vitals:   Visit Vitals  BP (!) 164/72 (BP Location: Left arm, Patient Position: Lying)   Pulse 60   Temp 36.4 °C (97.5 °F) (Temporal)   Resp 18   Ht 5' 2" (1.575 m)   Wt 81.6 kg (179 lb 16 oz)   SpO2 97%   Breastfeeding? No   BMI 32.92 kg/m²     "

## 2018-09-18 NOTE — PLAN OF CARE
Pt in phase II rec. Pt still a little drowsy from pain meds given in rec.  at bedside explained dc isntructions to pt and , both verv understanding.  Pt up to bathroom to void, has ace wrap on right leg with ice pack intact  Ambulated to bathroom with assistance.the patient much more awake since going to bathrrom , drank a cup of coffee and is now getting dressed with assist from   Vs stable. Will wheel to car via wheelchair when able

## 2018-09-18 NOTE — DISCHARGE INSTRUCTIONS
"Discharge Instructions: After Your Surgery/Procedure  Youve just had surgery. During surgery you were given medicine called anesthesia to keep you relaxed and free of pain. After surgery you may have some pain or nausea. This is common. Here are some tips for feeling better and getting well after surgery.     Stay on schedule with your medication.   Going home  Your doctor or nurse will show you how to take care of yourself when you go home. He or she will also answer your questions. Have an adult family member or friend drive you home.      For your safety we recommend these precaution for the first 24 hours after your procedure:  · Do not drive or use heavy equipment.  · Do not make important decisions or sign legal papers.  · Do not drink alcohol.  · Have someone stay with you, if needed. He or she can watch for problems and help keep you safe.  · Your concentration, balance, coordination, and judgement may be impaired for many hours after anesthesia.  Use caution when ambulating or standing up.     · You may feel weak and "washed out" after anesthesia and surgery.      Subtle residual effects of general anesthesia or sedation with regional / local anesthesia can last more than 24 hours.  Rest for the remainder of the day or longer if your Doctor/Surgeon has advised you to do so.  Although you may feel normal within the first 24 hours, your reflexes and mental ability may be impaired without you realizing it.  You may feel dizzy, lightheaded or sleepy for 24 hours or longer.      Be sure to go to all follow-up visits with your doctor. And rest after your surgery for as long as your doctor tells you to.  Coping with pain  If you have pain after surgery, pain medicine will help you feel better. Take it as told, before pain becomes severe. Also, ask your doctor or pharmacist about other ways to control pain. This might be with heat, ice, or relaxation. And follow any other instructions your surgeon or nurse gives " you.  Tips for taking pain medicine  To get the best relief possible, remember these points:  · Pain medicines can upset your stomach. Taking them with a little food may help.  · Most pain relievers taken by mouth need at least 20 to 30 minutes to start to work.  · Taking medicine on a schedule can help you remember to take it. Try to time your medicine so that you can take it before starting an activity. This might be before you get dressed, go for a walk, or sit down for dinner.  · Constipation is a common side effect of pain medicines. Call your doctor before taking any medicines such as laxatives or stool softeners to help ease constipation. Also ask if you should skip any foods. Drinking lots of fluids and eating foods such as fruits and vegetables that are high in fiber can also help. Remember, do not take laxatives unless your surgeon has prescribed them.  · Drinking alcohol and taking pain medicine can cause dizziness and slow your breathing. It can even be deadly. Do not drink alcohol while taking pain medicine.  · Pain medicine can make you react more slowly to things. Do not drive or run machinery while taking pain medicine.  Your health care provider may tell you to take acetaminophen to help ease your pain. Ask him or her how much you are supposed to take each day. Acetaminophen or other pain relievers may interact with your prescription medicines or other over-the-counter (OTC) drugs. Some prescription medicines have acetaminophen and other ingredients. Using both prescription and OTC acetaminophen for pain can cause you to overdose. Read the labels on your OTC medicines with care. This will help you to clearly know the list of ingredients, how much to take, and any warnings. It may also help you not take too much acetaminophen. If you have questions or do not understand the information, ask your pharmacist or health care provider to explain it to you before you take the OTC medicine.  Managing  nausea  Some people have an upset stomach after surgery. This is often because of anesthesia, pain, or pain medicine, or the stress of surgery. These tips will help you handle nausea and eat healthy foods as you get better. If you were on a special food plan before surgery, ask your doctor if you should follow it while you get better. These tips may help:  · Do not push yourself to eat. Your body will tell you when to eat and how much.  · Start off with clear liquids and soup. They are easier to digest.  · Next try semi-solid foods, such as mashed potatoes, applesauce, and gelatin, as you feel ready.  · Slowly move to solid foods. Dont eat fatty, rich, or spicy foods at first.  · Do not force yourself to have 3 large meals a day. Instead eat smaller amounts more often.  · Take pain medicines with a small amount of solid food, such as crackers or toast, to avoid nausea.     Call your surgeon if  · You still have pain an hour after taking medicine. The medicine may not be strong enough.  · You feel too sleepy, dizzy, or groggy. The medicine may be too strong.  · You have side effects like nausea, vomiting, or skin changes, such as rash, itching, or hives.       If you have obstructive sleep apnea  You were given anesthesia medicine during surgery to keep you comfortable and free of pain. After surgery, you may have more apnea spells because of this medicine and other medicines you were given. The spells may last longer than usual.   At home:  · Keep using the continuous positive airway pressure (CPAP) device when you sleep. Unless your health care provider tells you not to, use it when you sleep, day or night. CPAP is a common device used to treat obstructive sleep apnea.  · Talk with your provider before taking any pain medicine, muscle relaxants, or sedatives. Your provider will tell you about the possible dangers of taking these medicines.  © 6063-2735 [employer]. [address] [place] [address]  [place] [address]. All rights reserved. This information is not intended as a substitute for professional medical care. Always follow your healthcare professional's instructions.  Post op instructions for prevention of DVT  What is deep vein thrombosis?  Deep vein thrombosis (DVT) is the medical term for blood clots in the deep veins of the leg.  These blood clots can be dangerous.  A DVT can block a blood vessel and keep blood from getting where it needs to go.  Another problem is that the clot can travel to other parts of the body such as the lungs.  A clot that travels to the lungs is called a pulmonary embolus (PE) and can cause serious problems with breathing which can lead to death.  Am I at risk for DVT/PE?  If you are not very active, you are at risk of DVT.  Anyone confined to bed, sitting for long periods of time, recovering from surgery, etc. increases the risk of DVT.  Other risk factors are cancer diagnosis, certain medications, estrogen replacement in any form,older age, obesity, pregnancy, smoking, history of clotting disorders, and dehydration.  How will I know if I have a DVT?   Swelling in the lower leg   Pain   Warmth, redness, hardness or bulging of the vein  If you have any of these symptoms, call your doctors office right away.  Some people will not have any symptoms until the clot moves to the lungs.  What are the symptoms of a PE?   Panting, shortness of breath, or trouble breathing   Sharp, knife-like chest pain when you breathe   Coughing or coughing up blood   Rapid heartbeat  If you have any of these symptoms or get worse quickly, call 911 for emergency treatment.  How can I prevent a DVT?   Avoid long periods of inactivity and dont cross your legs--get up and walk around every hour or so.   Stay active--walking after surgery is highly encouraged.  This means you should get out of the house and walk in the neighborhood.  Going up and down stairs will not impair healing (unless advised  against such activity by your doctor).     Drink plenty of noncaffeinated, nonalcoholic fluids each day to prevent dehydration.   Wear special support stockings, if they have been advised by your doctor.   If you travel, stop at least once an hour and walk around.   Avoid smoking (assistance with stopping is available through your healthcare provider)  Always notify your doctor if you are not able to follow the post operative instructions that are given to you at the time of discharge.  It may be necessary to prescribe one of the medications available to prevent DVT.  Discharge Instructions for Knee Arthroscopy  You had knee arthroscopy. This surgical procedure uses small incisions to locate, identify, and treat problems inside the knee. These problems include loose bodies, meniscal tears, bone spurs, osteochondritis dissecans (OCD), and synovitis. Below are tips to help speed your recovery from surgery.  Activity  · Dont drive until your doctor says its OK. And never drive while taking opioid pain medicine.  · Remember to take pain medicines as directed; dont wait for the pain to get bad. And don't drink alcohol while taking pain medicines.  · Follow weight-bearing instructions given by your doctor. He or she may require you to use crutches to keep weight off your knee.  · Unless your doctor tells you otherwise, begin using the affected knee as much as you can tolerate 3 days after surgery.  · Slowly bend and straighten your affected leg as far as you can, unless your doctor tells you otherwise. Do this several times a day.  · Rest your knee by lying down and putting pillows under it for the first 3 days after surgery. Keep your ankle elevated above the level of your heart. This helps keep swelling down.  · Follow your doctors instructions about wearing and caring for a brace, immobilizer, or elastic dressing.  · Point and flex your foot, and rotate your ankle as much as possible during the first few weeks  following surgery. Also, wiggle your toes as much as possible.  Incision care  · Check your incision daily for redness, tenderness, or drainage.  · Dont be alarmed if there is some bruising, slight swelling of the knee, or a small amount of blood on the bandage.  · Adjust the bandage or brace as needed. It should feel supportive on your knee, but not too tight.   · Dont soak your incision in water (no hot tubs, bathtubs, swimming pools) until your doctor says its OK.  · Wait 2 day(s) after your surgery to begin showering. Then shower as needed. Cover your knee with plastic to keep the dressing or brace dry. Once your dressing is removed, follow your doctors instructions for care of the wound. And sit on a shower stool so that you dont fall while showering.  · Use an ice pack or bag of frozen peas--or something similar--wrapped in a thin towel to reduce the swelling. Keep the foot elevated while you ice the knee. Apply the ice pack for 20 minutes; then remove it for 20 minutes. Repeat as needed. Icing helps reduce swelling.  Other precautions  · Arrange your household to keep the items you need within reach.  · Remove throw rugs, electrical cords, and anything else that may cause you to fall.  · Use nonslip bath mats, grab bars, an elevated toilet seat, and a shower chair in your bathroom.  · Use a cane, crutches, a walker, or handrails until your balance, flexibility, and strength improve, and you can put weight on your leg. And remember to ask for help from others when you need it.  · Free up your hands so that you can use them to keep balance. Use a amanda pack, apron, or pockets to carry things.  Follow-up  Make a follow-up appointment as directed by your doctor.     When to seek medical attention  Call 911 right away if you have any of the following:  · Chest pain  · Shortness of breath  · Severe nausea  Otherwise, call your doctor immediately if you have any of the following:  · Pain that is not relieved by  medicine or rest  · Continued bleeding through the bandage  · Tingling, numbness, or coldness in your foot or leg  · Fever above 100.4°F (38.0°C) or shaking chills  · Excessive swelling, increased redness, or any drainage around the incision  · Swelling, tenderness, or pain in your leg      Date Last Reviewed: 11/16/2015  © 5973-5321 [employer]. [address] [place] [address], [place] [address]. All rights reserved. This information is not intended as a substitute for professional medical care. Always follow your healthcare professional's instructions.      1.Diet: Ice chips, clear liquids, and then diet as tolerated. Drink plenty of liquids.  2.Ice the area at least three times a day (20 minutes per session).  3.Elevate the extremity above the level of the heart to help reduce swelling.  4.Pain medication can be taken every four to six hours as needed. It is helpful to take pain medication prior to physical therapy.  5.Any activity that requires precise thinking or accuracy should be avoided for a minimum of 72 hours after surgery and while on narcotic pain medication. This includes operating machinery and/or driving a vehicle.  6.All sutures/staples will be removed approximately 14 days from the time of surgery. Leave steri-strips (skin tapes) in place until sutures are removed.  7. If skin glue is used instead of stitches, do not apply ointments or solutions to the incision. Keep the incision dry. The skin glue will peel off in 3-4 weeks.  8. Change dressing on the first post-op day. Use gauze for the first 3 days, then start using Band-Aids over the incision sites.   9. All casts, splints, braces, slings, crutches, abduction pillows, etc... Are to be worn as instructed. Crutches are just to be used as needed. If not needed for soreness or balance, may weight bear as tolerated without the crutches.  10. Keep the incision dry for 10-14 days. A waterproof dressing (purchase at [employer], etc) can be used  to shower. No bath, pool, hot tub until instructed.  11. Call [phone] with any questions or concerns.

## 2018-09-18 NOTE — DISCHARGE SUMMARY
[facility]  Discharge Note  Short Stay    Admit Date: 9/18/2018    Discharge Date and Time: 9/18/2018    Attending Physician: Pradeep Cuellar MD     Discharge Provider: Pradeep Cuellar    Diagnoses:  Active Hospital Problems    Diagnosis  POA    *Acute medial meniscal tear, right, subsequent encounter [S83.739R]  Not Applicable      Resolved Hospital Problems   No resolved problems to display.       Discharged Condition: good    Hospital Course: Patient was admitted for an outpatient procedure and tolerated the procedure well with no complications.    Final Diagnoses: Same as principal problem.    Disposition: Home or Self Care    Follow up/Patient Instructions:    Medications:  Reconciled Home Medications:      Medication List      START taking these medications    HYDROcodone-acetaminophen 5-325 mg per tablet  Commonly known as:  NORCO  Take 1 tablet by mouth every 6 (six) hours as needed for Pain.        CONTINUE taking these medications    atorvastatin 40 MG tablet  Commonly known as:  LIPITOR  TAKE 1 TABLET EVERY DAY        ASK your doctor about these medications    b complex vitamins tablet  Take 1 tablet by mouth once daily.     BD ALCOHOL SWABS Padm  Generic drug:  alcohol swabs     buPROPion 300 MG 24 hr tablet  Commonly known as:  WELLBUTRIN XL  Take 1 tablet (300 mg total) by mouth once daily.     chlordiazepoxide-clidinium 5-2.5 mg 5-2.5 mg Cap  Commonly known as:  LIBRAX  Take by mouth. 2 qam, 1 q noon, 1 qhs     DULoxetine 60 MG capsule  Commonly known as:  CYMBALTA  TAKE 1 CAPSULE TWICE DAILY     esomeprazole 40 MG capsule  Commonly known as:  NEXIUM  TAKE 1 CAPSULE EVERY DAY     furosemide 20 MG tablet  Commonly known as:  LASIX  TAKE 1 TABLET EVERY DAY     * gabapentin 400 MG capsule  Commonly known as:  NEURONTIN  Take 1 capsule (400 mg total) by mouth every evening.     * gabapentin 100 MG capsule  Commonly known as:  NEURONTIN  TAKE 1 CAPSULE BY MOUTH ONCE DAILY      levothyroxine 75 MCG tablet  Commonly known as:  SYNTHROID  TAKE 1 TABLET DAILY BEFORE BREAKFAST     lisinopril 5 MG tablet  Commonly known as:  PRINIVIL,ZESTRIL  Take 1 tablet (5 mg total) by mouth once daily.     metoprolol succinate 25 MG 24 hr tablet  Commonly known as:  TOPROL-XL  TAKE 1 TABLET EVERY DAY     topiramate 50 MG tablet  Commonly known as:  TOPAMAX  TAKE 1 TABLET ONE TIME DAILY     TRUE METRIX AIR GLUCOSE METER kit  Generic drug:  blood-glucose meter     TRUE METRIX GLUCOSE TEST STRIP Strp  Generic drug:  blood sugar diagnostic     TRUE METRIX LEVEL 1 Soln  Generic drug:  blood glucose control, low     TRUEPLUS LANCETS 28 gauge Misc  Generic drug:  lancets     VITAMIN D2 50,000 unit Cap  Generic drug:  ergocalciferol  Take 50,000 Units by mouth every 7 days.         * This list has 2 medication(s) that are the same as other medications prescribed for you. Read the directions carefully, and ask your doctor or other care provider to review them with you.              Discharge Procedure Orders   Ice to affected area     Call MD for:  temperature >100.4     Call MD for:  severe uncontrolled pain     Call MD for:  redness, tenderness, or signs of infection (pain, swelling, redness, odor or green/yellow discharge around incision site)     Remove dressing in 48 hours     Change dressing (specify)   Order Comments: Dressing change: One time per day using Waterproof Bandaids.         Discharge Procedure Orders (must include Diet, Follow-up, Activity):   Discharge Procedure Orders (must include Diet, Follow-up, Activity)   Ice to affected area     Call MD for:  temperature >100.4     Call MD for:  severe uncontrolled pain     Call MD for:  redness, tenderness, or signs of infection (pain, swelling, redness, odor or green/yellow discharge around incision site)     Remove dressing in 48 hours     Change dressing (specify)   Order Comments: Dressing change: One time per day using Waterproof Bandaids.

## 2018-09-18 NOTE — OP NOTE
[facility]  Orthopedic Surgery  Operative Note    SUMMARY     Date of Procedure: 9/18/2018     Procedure: Procedure(s) (LRB):  ARTHROSCOPY, KNEE, WITH MENISCECTOMY (Right)  MENISCECTOMY, KNEE, MEDIAL and lateral (Right)       Surgeon(s) and Role:     * Pradeep Cuellar MD - Primary    Assistant: Dinesh Johnson    Pre-Operative Diagnosis: Acute medial meniscus tear of right knee, subsequent encounter [S83.241D]    Post-Operative Diagnosis: Post-Op Diagnosis Codes:     * Acute medial meniscus tear of right knee, subsequent encounter [S83.241D]  Lateral meniscal tear    Anesthesia: General    Diagnostic arthroscopy findings: Diagnostic arthroscopy findings, the patient's medial compartment was thoroughly examined. The patient had moderate to severe cartilage wear and posterior horn medial meniscal tear. ACL and PCL were both intact but had significant stranding of both ligaments.  Lateral compartment showed complex posterior horn degenerative tearing as well with moderate to severe tibial articular   wear. 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    Attestation: I was present and scrubbed for the entire procedure.    INDICATIONS FOR THE PROCEDURE:  68-year-old female with a history of medial knee pain. Patient failed conservative measures and after discussion wished to proceed with the medial meniscectomy.    PROCEDURE IN DETAIL: Risks, benefits and alternatives of the procedure were   explained to the patient including, but not limited to damage to nerves,   arteries, blood vessels. Also explained risk of infection, DVT,  PE, continued pain due to arthritis,  as well as   anesthetic complications including seizure, stroke, heart attack and death. They  understood this and signed informed consent. The patient's Right knee was marked prior to coming to the Operating Room. Once there a formal   timeout was done in which correct patient, procedure and op site were all   correctly identified and confirmed by the entire operating team. Ancef 2 g was   given prior to surgical incision. Laryngeal mask anesthesia was induced. The   patient's Right lower extremity was prepped and draped in normal sterile fashion.   Leg was then exsanguinated with a tourniquet and tourniquet was inflated up   300 mmHg. Standard inferior lateral portal was then made. A spinal needle was   used to localize an inferior medial portal and this was made under direct   arthroscopic visualization. Diagnostic arthroscopy was then performed with the   findings listed above. Shaver was used to remove redundant fat pad and   Synovium within the notch.  3.5 millimeter full radius shaver was used to perform a partial medial meniscectomy of the complex tear of the posterior horn medial meniscus.  It was then used to perform a chondroplasty of the frayed femoral condylar tissue back to more stable chondral margins. We then performed a partial lateral meniscectomy in the same manner of the posterior horn.  Chondroplasty was performed of the tibial plateau surface.  Chondroplasty was then performed of the undersurface patella and the trochlear groove.  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    Proceeded with closing. All   excess water was removed from the knee joint. Portals were closed using   nylons. Portal was then injected with 0.25% Marcaine with epinephrine. Sterile   dressing was then applied. They were then extubated and awakened and transferred   from the Operating Room to the Recovery Room in stable condition.     Postop course is for an  arthroscopic medial and lateral meniscectomy

## 2018-09-18 NOTE — ANESTHESIA POSTPROCEDURE EVALUATION
"Anesthesia Post Evaluation    Patient: Franca Coffey    Procedure(s) Performed: Procedure(s) (LRB):  ARTHROSCOPY, KNEE, WITH MENISCECTOMY (Right)  MENISCECTOMY, KNEE, MEDIAL (Right)    Final Anesthesia Type: general  Patient location during evaluation: PACU  Patient participation: Yes- Able to Participate  Level of consciousness: awake and alert  Post-procedure vital signs: reviewed and stable  Pain management: adequate  Airway patency: patent  PONV status at discharge: No PONV  Anesthetic complications: no      Cardiovascular status: hemodynamically stable  Respiratory status: unassisted and room air  Hydration status: euvolemic  Follow-up not needed.        Visit Vitals  BP (!) 168/75   Pulse 64   Temp 36.4 °C (97.5 °F) (Temporal)   Resp 16   Ht 5' 2" (1.575 m)   Wt 81.6 kg (179 lb 16 oz)   SpO2 98%   Breastfeeding? No   BMI 32.92 kg/m²       Pain/Kevin Score: Pain Assessment Performed: Yes (9/18/2018 10:30 AM)  Presence of Pain: complains of pain/discomfort (9/18/2018 10:30 AM)  Pain Rating Prior to Med Admin: 7 (9/18/2018  9:45 AM)  Pain Rating Post Med Admin: 6 (9/18/2018 10:30 AM)  Kevin Score: 8 (9/18/2018  9:45 AM)        "

## 2018-09-19 VITALS
OXYGEN SATURATION: 98 % | BODY MASS INDEX: 33.13 KG/M2 | TEMPERATURE: 98 F | WEIGHT: 180 LBS | SYSTOLIC BLOOD PRESSURE: 168 MMHG | RESPIRATION RATE: 16 BRPM | DIASTOLIC BLOOD PRESSURE: 75 MMHG | HEIGHT: 62 IN | HEART RATE: 64 BPM

## 2018-09-20 ENCOUNTER — PATIENT MESSAGE (OUTPATIENT)
Dept: ORTHOPEDICS | Facility: CLINIC | Age: 68
End: 2018-09-20

## 2018-10-04 ENCOUNTER — OFFICE VISIT (OUTPATIENT)
Dept: ORTHOPEDICS | Facility: CLINIC | Age: 68
End: 2018-10-04
Payer: MEDICARE

## 2018-10-04 VITALS
DIASTOLIC BLOOD PRESSURE: 68 MMHG | HEART RATE: 70 BPM | HEIGHT: 62 IN | SYSTOLIC BLOOD PRESSURE: 124 MMHG | BODY MASS INDEX: 32.94 KG/M2 | WEIGHT: 179 LBS

## 2018-10-04 DIAGNOSIS — S83.241D ACUTE MEDIAL MENISCAL TEAR, RIGHT, SUBSEQUENT ENCOUNTER: Primary | ICD-10-CM

## 2018-10-04 PROCEDURE — 99213 OFFICE O/P EST LOW 20 MIN: CPT | Mod: PBBFAC,PN | Performed by: ORTHOPAEDIC SURGERY

## 2018-10-04 PROCEDURE — 99999 PR PBB SHADOW E&M-EST. PATIENT-LVL III: CPT | Mod: PBBFAC,,, | Performed by: ORTHOPAEDIC SURGERY

## 2018-10-04 PROCEDURE — 99024 POSTOP FOLLOW-UP VISIT: CPT | Mod: ,,, | Performed by: ORTHOPAEDIC SURGERY

## 2018-10-04 NOTE — PROGRESS NOTES
Past Medical History:   Diagnosis Date    Anxiety     Cataract     Depression     Diabetes mellitus     managing with diet    Dizzy     Fibromyalgia     GERD (gastroesophageal reflux disease)     History of bladder infections     Hypertension     Hypothyroid     IBS (irritable bowel syndrome)     Kidney stones     Meniere disease     Migraine     Muscle spasms of head and/or neck     and lower ext    Osteoarthritis     PONV (postoperative nausea and vomiting)     severe-cause by morphine per pt    Sleep apnea     CPAP       Past Surgical History:   Procedure Laterality Date    ELXRLALO-[id number] Left 8/3/2018    [facility]    ARTHROSCOPY, KNEE Right 10/15/2013    Performed by Edgar Kumar Jr., [facility] at [facility] OR    ARTHROSCOPY, KNEE, WITH MENISCECTOMY Right 9/18/2018    [facility]    BLADDER SUSPENSION      BLOCK-NERVE Left 6/17/2016    [facility] at [facility] OR    BLOCK-NERVE-MEDIAL BRANCH-LUMBAR Bilateral 8/1/2014    [facility]    BREAST BIOPSY      CHONDRECTOMY, KNEE, SEMILUNAR CARTILAGE Right 10/15/2013    Performed by Edgar Kumar Jr[facility] at [facility] OR    CHONDROPLASTY-CONDYLE Right 10/15/2013    [facility] at [facility] OR    COLONOSCOPY  8/27/14    Dr. Thomas, 5 year recheck    COLONOSCOPY N/A 8/27/2014    [facility] Luis [facility] at [facility] ENDO    dental implant      upper RT implant    EGD (ESOPHAGOGASTRODUODENOSCOPY) N/A 4/15/2013    [facility]    ESOPHAGOGASTRODUODENOSCOPY (EGD) N/A 6/27/2016    Per[facility] by Luis [facility] at [facility] ENDO    EXCISION OF MEDIAL MENISCUS OF KNEE Right 9/18/2018    Procedure: MENISCECTOMY, KNEE, MEDIAL;  Surgeon: Pradeep Cuellar MD;  Location: [facility];  Service: Orthopedics;  Laterality: Right;  medial and lateral    EYE SURGERY       bilateral PHACO with IOL    FOOT SURGERY Bilateral 12/05/2008    bunionectomy    FRACTURE SURGERY      HYSTERECTOMY      INJECTION-STEROID-EPIDURAL-LUMBAR N/A 12/23/2014    [facility]    JOINT REPLACEMENT Left     Partial knee    JOINT REPLACEMENT Left     Total knee replacement    KNEE ARTHROSCOPY Bilateral     KNEE ARTHROSCOPY W/ MENISCECTOMY Right 9/18/2018    Procedure: ARTHROSCOPY, KNEE, WITH MENISCECTOMY;  Surgeon: Pradeep Cuellar MD;  Location: [facility] OR;  Service: Orthopedics;  Laterality: Right;    MANDIBLE FRACTURE SURGERY      MENISCECTOMY, KNEE, MEDIAL Right 9/18/2018    Per[facility] by [facility] at [facility] OR    MULTIPLE TOOTH EXTRACTIONS      radiofrequency ablation of the genicular branches of the left knee (4) Left 8/12/2016    Performed by [facility]ers [facility] at [facility] OR    RADIOFREQUENCY THERMOCOAGULATION (RFTC)-NERVE-MEDIAN BRANCH-LUMBAR Bilateral 2/16/2018    [facility]    RADIOFREQUENCY THERMOCOAGULATION (RFTC)-NERVE-MEDIAN BRANCH-LUMBAR Bilateral 8/8/2014    [facility] at [facility]    revision of mesh      repair to bladder suspension    SYNOVECTOMY, KNEE Right 10/15/2013    Performed by Edgar [facility] Jr[facility] at [facility] OR    TONSILLECTOMY, ADENOIDECTOMY      UPPER GASTROINTESTINAL ENDOSCOPY      VAGINAL DELIVERY      times 2       Current Outpatient Medications   Medication Sig    atorvastatin (LIPITOR) 40 MG tablet TAKE 1 TABLET EVERY DAY    b complex vitamins tablet Take 1 tablet by mouth once daily.    BD ALCOHOL SWABS PadM     buPROPion (WELLBUTRIN XL) 300 MG 24 hr tablet Take 1 tablet (300 mg total) by mouth once daily.    chlordiazepoxide-clidinium 5-2.5 mg (LIBRAX) 5-2.5 mg Cap Take by mouth. 2 qam, 1 q noon, 1 qhs    DULoxetine (CYMBALTA) 60 MG capsule TAKE 1 CAPSULE TWICE DAILY    ergocalciferol (VITAMIN D2) 50,000 unit Cap Take 50,000 Units by mouth every 7 days.     esomeprazole (NEXIUM) 40 MG capsule TAKE 1 CAPSULE EVERY DAY    furosemide (LASIX) 20 MG tablet TAKE 1 TABLET EVERY DAY    gabapentin (NEURONTIN) 100 MG capsule TAKE 1 CAPSULE BY MOUTH ONCE DAILY    gabapentin (NEURONTIN) 400 MG capsule Take 1 capsule (400 mg total) by mouth every evening.    levothyroxine (SYNTHROID) 75 MCG tablet TAKE 1 TABLET DAILY BEFORE BREAKFAST    lisinopril (PRINIVIL,ZESTRIL) 5 MG tablet Take 1 tablet (5 mg total) by mouth once daily.    metoprolol succinate (TOPROL-XL) 25 MG 24 hr tablet TAKE 1 TABLET EVERY DAY    topiramate (TOPAMAX) 50 MG tablet TAKE 1 TABLET ONE TIME DAILY    TRUE METRIX AIR GLUCOSE METER kit     TRUE METRIX GLUCOSE TEST STRIP Strp     TRUE METRIX LEVEL 1 Soln     TRUEPLUS LANCETS 28 gauge Misc      No current facility-administered medications for this visit.        Review of patient's allergies indicates:   Allergen Reactions    Sulfa (sulfonamide antibiotics) Hives, Itching and Rash    Penicillins      Other reaction(s): Unknown. Childhood reaction. Pt does not remember reaction.    Adhesive Itching and Rash     Paper tape OK    Garlic Diarrhea    Morphine Nausea And Vomiting       Family History   Problem Relation Age of Onset    Diabetes Mother     Heart disease Mother     Kidney disease Mother     Hypertension Mother     Hyperlipidemia Mother     Diabetes Mellitus Mother     Heart disease Father     Diabetes Father     Hypertension Father     Hyperlipidemia Father     Diabetes Mellitus Father     COPD Father     Heart disease Brother     Cancer Brother     Early death Brother     Stomach cancer Brother     Breast cancer Maternal Aunt     Heart disease Brother     Ovarian cancer Neg Hx     Cataracts Neg Hx     Glaucoma Neg Hx     Macular degeneration Neg Hx     Retinal detachment Neg Hx     Thyroid disease Neg Hx     Stroke Neg Hx     Rheum arthritis Neg Hx     Psoriasis Neg Hx     Osteoarthritis Neg Hx     Lupus Neg Hx      Inflammatory bowel disease Neg Hx     Depression Neg Hx     Chronic back pain Neg Hx     Asthma Neg Hx        Social History     Socioeconomic History    Marital status:      Spouse name: Not on file    Number of children: Not on file    Years of education: Not on file    Highest education level: Not on file   Social Needs    Financial resource strain: Not on file    Food insecurity - worry: Not on file    Food insecurity - inability: Not on file    Transportation needs - medical: Not on file    Transportation needs - non-medical: Not on file   Occupational History    Not on file   Tobacco Use    Smoking status: Former Smoker     Packs/day: 0.25     Years: 1.00     Pack years: 0.25     Last attempt to quit: 10/15/1966     Years since quittin.0    Smokeless tobacco: Never Used   Substance and Sexual Activity    Alcohol use: Yes     Comment: occasionally    Drug use: No    Sexual activity: Not Currently     Partners: Male     Birth control/protection: Post-menopausal, Surgical   Other Topics Concern    Not on file   Social History Narrative    Not on file       Chief Complaint:   Chief Complaint   Patient presents with    Post-op Evaluation     s/p right knee scope 18        Date of surgery:  2018    History of present illness:  68-year-old female who underwent knee arthroscopy.  Patient had pretty severe arthritic change of her right knee.  Pain is a 5/10.  No erythema or drainage.  Good range of motion.      Review of Systems:    Musculoskeletal:  See HPI        Physical Examination:    Vital Signs:    Vitals:    10/04/18 1435   BP: 124/68   Pulse: 70       Body mass index is 32.74 kg/m².    This a well-developed, well nourished patient in no acute distress.  They are alert and oriented and cooperative to examination.  Pt. walks without an antalgic gait.      Examination of the right knee shows well-healing surgical portals.  No erythema or drainage.  Patient  has full range of motion. No calf pain. Negative Homans sign.    X-rays:  None     Assessment::  Status post right partial medial meniscectomy with 3 compartment chondroplasty    Plan:  I reviewed the findings with her today.  We took out her stitches.  Reviewed a post arthroscopy exercise guide. Follow up in 4 weeks.    This note was created using M Modal voice recognition software that occasionally misinterpreted phrases or words.

## 2018-10-08 DIAGNOSIS — M79.7 FIBROMYALGIA: ICD-10-CM

## 2018-10-08 DIAGNOSIS — M54.31 SCIATICA OF RIGHT SIDE: ICD-10-CM

## 2018-10-08 DIAGNOSIS — G89.29 CHRONIC LEFT-SIDED LOW BACK PAIN WITH LEFT-SIDED SCIATICA: ICD-10-CM

## 2018-10-08 DIAGNOSIS — I10 ESSENTIAL HYPERTENSION: ICD-10-CM

## 2018-10-08 DIAGNOSIS — M54.42 CHRONIC LEFT-SIDED LOW BACK PAIN WITH LEFT-SIDED SCIATICA: ICD-10-CM

## 2018-10-08 RX ORDER — METOPROLOL SUCCINATE 25 MG/1
25 TABLET, EXTENDED RELEASE ORAL DAILY
Qty: 90 TABLET | Refills: 1 | Status: SHIPPED | OUTPATIENT
Start: 2018-10-08 | End: 2019-04-17 | Stop reason: SDUPTHER

## 2018-10-08 RX ORDER — DULOXETIN HYDROCHLORIDE 60 MG/1
60 CAPSULE, DELAYED RELEASE ORAL 2 TIMES DAILY
Qty: 180 CAPSULE | Refills: 1 | Status: SHIPPED | OUTPATIENT
Start: 2018-10-08 | End: 2018-11-26

## 2018-10-08 RX ORDER — GABAPENTIN 100 MG/1
100 CAPSULE ORAL DAILY
Qty: 90 CAPSULE | Refills: 0 | Status: SHIPPED | OUTPATIENT
Start: 2018-10-08 | End: 2018-11-20 | Stop reason: SDUPTHER

## 2018-10-08 RX ORDER — FUROSEMIDE 20 MG/1
20 TABLET ORAL DAILY
Qty: 90 TABLET | Refills: 1 | Status: SHIPPED | OUTPATIENT
Start: 2018-10-08 | End: 2019-02-26 | Stop reason: SDUPTHER

## 2018-10-23 DIAGNOSIS — R12 HEARTBURN: ICD-10-CM

## 2018-10-23 RX ORDER — ESOMEPRAZOLE MAGNESIUM 40 MG/1
CAPSULE, DELAYED RELEASE ORAL
Qty: 90 CAPSULE | Refills: 2 | Status: SHIPPED | OUTPATIENT
Start: 2018-10-23 | End: 2019-02-26 | Stop reason: SDUPTHER

## 2018-10-27 DIAGNOSIS — G89.29 CHRONIC LEFT-SIDED LOW BACK PAIN WITH LEFT-SIDED SCIATICA: ICD-10-CM

## 2018-10-27 DIAGNOSIS — M54.31 SCIATICA OF RIGHT SIDE: ICD-10-CM

## 2018-10-27 DIAGNOSIS — M79.7 FIBROMYALGIA: ICD-10-CM

## 2018-10-27 DIAGNOSIS — M54.42 CHRONIC LEFT-SIDED LOW BACK PAIN WITH LEFT-SIDED SCIATICA: ICD-10-CM

## 2018-10-28 RX ORDER — GABAPENTIN 100 MG/1
CAPSULE ORAL
Qty: 90 CAPSULE | Refills: 0 | Status: SHIPPED | OUTPATIENT
Start: 2018-10-28 | End: 2019-02-26 | Stop reason: SDUPTHER

## 2018-11-13 ENCOUNTER — TELEPHONE (OUTPATIENT)
Dept: ORTHOPEDICS | Facility: CLINIC | Age: 68
End: 2018-11-13

## 2018-11-13 NOTE — TELEPHONE ENCOUNTER
Called and unable to leave message due to voicemail being full. Attempted to contact patient to reschedule cancelled appointment on 11/1/18

## 2018-11-17 ENCOUNTER — PATIENT MESSAGE (OUTPATIENT)
Dept: ENDOCRINOLOGY | Facility: CLINIC | Age: 68
End: 2018-11-17

## 2018-11-19 DIAGNOSIS — M79.7 FIBROMYALGIA: ICD-10-CM

## 2018-11-19 DIAGNOSIS — I10 HYPERTENSION, UNSPECIFIED TYPE: ICD-10-CM

## 2018-11-19 DIAGNOSIS — F32.1 CURRENT MODERATE EPISODE OF MAJOR DEPRESSIVE DISORDER WITHOUT PRIOR EPISODE: ICD-10-CM

## 2018-11-19 DIAGNOSIS — E11.65 TYPE 2 DIABETES MELLITUS WITH HYPERGLYCEMIA, WITHOUT LONG-TERM CURRENT USE OF INSULIN: ICD-10-CM

## 2018-11-19 RX ORDER — LISINOPRIL 5 MG/1
TABLET ORAL
Qty: 90 TABLET | Refills: 0 | Status: SHIPPED | OUTPATIENT
Start: 2018-11-19 | End: 2019-01-09

## 2018-11-19 RX ORDER — BUPROPION HYDROCHLORIDE 300 MG/1
TABLET ORAL
Qty: 90 TABLET | Refills: 1 | OUTPATIENT
Start: 2018-11-19

## 2018-11-19 RX ORDER — TOPIRAMATE 50 MG/1
TABLET, FILM COATED ORAL
Qty: 90 TABLET | Refills: 3 | Status: SHIPPED | OUTPATIENT
Start: 2018-11-19 | End: 2019-02-26

## 2018-11-20 ENCOUNTER — OFFICE VISIT (OUTPATIENT)
Dept: RHEUMATOLOGY | Facility: CLINIC | Age: 68
End: 2018-11-20
Payer: MEDICARE

## 2018-11-20 VITALS — BODY MASS INDEX: 32.74 KG/M2 | HEIGHT: 62 IN

## 2018-11-20 DIAGNOSIS — M79.7 FIBROMYALGIA: ICD-10-CM

## 2018-11-20 DIAGNOSIS — G89.29 CHRONIC LEFT-SIDED LOW BACK PAIN WITH LEFT-SIDED SCIATICA: ICD-10-CM

## 2018-11-20 DIAGNOSIS — M25.50 POLYARTHRALGIA: Primary | ICD-10-CM

## 2018-11-20 DIAGNOSIS — M54.31 SCIATICA OF RIGHT SIDE: ICD-10-CM

## 2018-11-20 DIAGNOSIS — M54.42 CHRONIC LEFT-SIDED LOW BACK PAIN WITH LEFT-SIDED SCIATICA: ICD-10-CM

## 2018-11-20 PROCEDURE — 3288F FALL RISK ASSESSMENT DOCD: CPT | Mod: CPTII,S$GLB,, | Performed by: INTERNAL MEDICINE

## 2018-11-20 PROCEDURE — 99999 PR PBB SHADOW E&M-EST. PATIENT-LVL II: CPT | Mod: PBBFAC,,, | Performed by: INTERNAL MEDICINE

## 2018-11-20 PROCEDURE — 1100F PTFALLS ASSESS-DOCD GE2>/YR: CPT | Mod: CPTII,S$GLB,, | Performed by: INTERNAL MEDICINE

## 2018-11-20 PROCEDURE — 99213 OFFICE O/P EST LOW 20 MIN: CPT | Mod: S$GLB,,, | Performed by: INTERNAL MEDICINE

## 2018-11-20 RX ORDER — GABAPENTIN 400 MG/1
CAPSULE ORAL
Qty: 90 CAPSULE | Refills: 1 | OUTPATIENT
Start: 2018-11-20

## 2018-11-20 RX ORDER — DULOXETIN HYDROCHLORIDE 60 MG/1
60 CAPSULE, DELAYED RELEASE ORAL 2 TIMES DAILY
Qty: 180 CAPSULE | Refills: 1 | Status: CANCELLED | OUTPATIENT
Start: 2018-11-20

## 2018-11-20 RX ORDER — GABAPENTIN 100 MG/1
100 CAPSULE ORAL DAILY
Qty: 90 CAPSULE | Refills: 1 | Status: SHIPPED | OUTPATIENT
Start: 2018-11-20 | End: 2018-12-27 | Stop reason: SDUPTHER

## 2018-11-20 RX ORDER — GABAPENTIN 100 MG/1
CAPSULE ORAL
Qty: 90 CAPSULE | Refills: 0 | OUTPATIENT
Start: 2018-11-20

## 2018-11-20 RX ORDER — GABAPENTIN 400 MG/1
400 CAPSULE ORAL NIGHTLY
Qty: 90 CAPSULE | Refills: 1 | Status: SHIPPED | OUTPATIENT
Start: 2018-11-20 | End: 2019-02-26 | Stop reason: SDUPTHER

## 2018-11-20 ASSESSMENT — ROUTINE ASSESSMENT OF PATIENT INDEX DATA (RAPID3)
AM STIFFNESS SCORE: 1, YES
MDHAQ FUNCTION SCORE: 1.5
FATIGUE SCORE: 2
WHEN YOU AWAKENED IN THE MORNING OVER THE LAST WEEK, PLEASE INDICATE THE AMOUNT OF TIME IT TAKES UNTIL YOU ARE AS LIMBER AS YOU WILL BE FOR THE DAY: 1
PSYCHOLOGICAL DISTRESS SCORE: 4.4
TOTAL RAPID3 SCORE: 6.66
PATIENT GLOBAL ASSESSMENT SCORE: 6
PAIN SCORE: 9

## 2018-11-20 NOTE — PROGRESS NOTES
"Subjective:       Patient ID: Franca Coffey is a 68 y.o. female.    Chief Complaint: Fibromyalgia  HPI 68-year-old female with multiple comorbidities including diabetes, Hashimoto's thyroiditis, this metabolic syndrome, REYES, hypertension is here for follow up for Fibromyalgia. Currently, on gabapentin and Cymbalta. Wellbutrin was started in Aug 2018   S/P R partial medial meniscectomy with 3 compartment chondroplasty   Today, she grades her pain as 9/10 aching type, worse in the evening, worse with activity, improves with  NSAIDs.        Review of Systems   HENT: Negative for ear discharge and ear pain.    Eyes: Negative for pain and redness.   Respiratory: Negative for cough and shortness of breath.    Cardiovascular: Negative for chest pain and palpitations.   Gastrointestinal: Negative for abdominal distention and abdominal pain.   Genitourinary: Negative for genital sores and hematuria.   Musculoskeletal: Positive for arthralgias and myalgias.   Skin: Negative for color change and wound.   Psychiatric/Behavioral: Negative for dysphoric mood and self-injury.         Objective:   Ht 5' 2" (1.575 m)   BMI 32.74 kg/m²      Physical Exam   Nursing note and vitals reviewed.  Constitutional: She is oriented to person, place, and time and well-developed, well-nourished, and in no distress.   HENT:   Head: Normocephalic and atraumatic.   Eyes: Conjunctivae and EOM are normal. Pupils are equal, round, and reactive to light.   Neck: Normal range of motion. Neck supple. No thyromegaly present.   Cardiovascular: Normal rate and regular rhythm.  Exam reveals no friction rub.    Pulmonary/Chest: Effort normal and breath sounds normal.   Abdominal: Soft. Bowel sounds are normal.   Neurological: She is alert and oriented to person, place, and time.   Skin: Skin is warm and dry.     Psychiatric: Memory and affect normal.   Musculoskeletal: She exhibits no edema.   No joint swelling         Lab Results   Component " Value Date    WBC 6.00 09/10/2018    HGB 12.0 09/10/2018    HCT 36.5 (L) 09/10/2018    MCV 89 09/10/2018     09/10/2018     CMP  Sodium   Date Value Ref Range Status   09/10/2018 144 136 - 145 mmol/L Final     Potassium   Date Value Ref Range Status   09/10/2018 4.1 3.5 - 5.1 mmol/L Final     Chloride   Date Value Ref Range Status   09/10/2018 106 95 - 110 mmol/L Final     CO2   Date Value Ref Range Status   09/10/2018 28 23 - 29 mmol/L Final     Glucose   Date Value Ref Range Status   09/10/2018 94 70 - 110 mg/dL Final     BUN, Bld   Date Value Ref Range Status   09/10/2018 16 8 - 23 mg/dL Final     Creatinine   Date Value Ref Range Status   09/10/2018 0.9 0.5 - 1.4 mg/dL Final     Calcium   Date Value Ref Range Status   09/10/2018 9.9 8.7 - 10.5 mg/dL Final     Total Protein   Date Value Ref Range Status   02/27/2018 6.8 6.0 - 8.4 g/dL Final     Albumin   Date Value Ref Range Status   02/27/2018 3.4 (L) 3.5 - 5.2 g/dL Final     Total Bilirubin   Date Value Ref Range Status   02/27/2018 0.3 0.1 - 1.0 mg/dL Final     Comment:     For infants and newborns, interpretation of results should be based  on gestational age, weight and in agreement with clinical  observations.  Premature Infant recommended reference ranges:  Up to 24 hours.............<8.0 mg/dL  Up to 48 hours............<12.0 mg/dL  3-5 days..................<15.0 mg/dL  6-29 days.................<15.0 mg/dL       Alkaline Phosphatase   Date Value Ref Range Status   02/27/2018 88 55 - 135 U/L Final     AST   Date Value Ref Range Status   02/27/2018 16 10 - 40 U/L Final     ALT   Date Value Ref Range Status   02/27/2018 17 10 - 44 U/L Final     Anion Gap   Date Value Ref Range Status   09/10/2018 10 8 - 16 mmol/L Final     eGFR if    Date Value Ref Range Status   09/10/2018 >60 >60 mL/min/1.73 m^2 Final     eGFR if non    Date Value Ref Range Status   09/10/2018 >60 >60 mL/min/1.73 m^2 Final     Comment:      Calculation used to obtain the estimated glomerular filtration  rate (eGFR) is the CKD-EPI equation.        Lab Results   Component Value Date    SEDRATE 11 08/24/2017     Lab Results   Component Value Date    CRP 0.6 08/24/2017      Results for GEOFFREY CAICEDO (MRN [id number]) as of 3/28/2017 14:11   Ref. Range 4/2/2012 08:34 8/12/2014 10:41   NOHEMY Latest Ref Range: Neg <1:160  Pos, Titer to follow (A)    NOHEMY HEP-2 Titer Latest Ref Range: Neg <1:160 titer Pos 1:320 (A)    NOHEMY Hep-2 Pattern Unknown Nucleolar (A)    Anti-SSA Antibody Latest Ref Range: <20 EU 0.70    Anti-SSA Interpretation Latest Ref Range: Negative  Negative    Anti-SSB Antibody Latest Ref Range: <20 EU 1.47    Anti-SSB Interpretation Latest Ref Range: Negative  Negative    ds DNA Ab Latest Ref Range: Negative Titer Negative    Anti Sm Antibody Latest Ref Range: <20 EU 1.15    Anti-Sm Interpretation Latest Ref Range: Negative  Negative    Anti Sm/RNP Antibody Latest Ref Range: <20 EU 2.87    Anti-Sm/RNP Interpretation Latest Ref Range: Negative  Negative    TTG IgA Latest Ref Range: <20 UNITS  14   IgA Latest Ref Range: 40 - 350 mg/dL  179   CCP Antibodies Latest Ref Range: <5.0 U/mL <1.0    Rheumatoid Factor Latest Ref Range: 0 - 15 IU/ml 21 (H)        X ray of right hand in Aug 2016- ? periarticular osteopenia    DEXA 2015- Osteopenia -- there is a 7.6% risk of a major osteoporotic fracture and a 0.9% risk of hip fracture in the next 10 years (FRAX).    Assessment:   Fibromyalgia   DDD with left-sided   Generalized osteoarthritis  Long-term NSAID use  Plan:   Continue gabapentin to 100 mg in morning,  And 400 mg at bedtime.  Discussed medication interactions with cymbalta and wellbutrin   Wean off Cymbalta   naproxen 500 mg twice a day with meals as needed     Return to clinic in 6 months

## 2018-11-23 DIAGNOSIS — R12 HEARTBURN: ICD-10-CM

## 2018-11-26 ENCOUNTER — LAB VISIT (OUTPATIENT)
Dept: LAB | Facility: HOSPITAL | Age: 68
End: 2018-11-26
Attending: INTERNAL MEDICINE
Payer: MEDICARE

## 2018-11-26 ENCOUNTER — OFFICE VISIT (OUTPATIENT)
Dept: ENDOCRINOLOGY | Facility: CLINIC | Age: 68
End: 2018-11-26
Payer: MEDICARE

## 2018-11-26 VITALS
BODY MASS INDEX: 34.03 KG/M2 | TEMPERATURE: 98 F | RESPIRATION RATE: 18 BRPM | WEIGHT: 184.94 LBS | HEIGHT: 62 IN | HEART RATE: 73 BPM | DIASTOLIC BLOOD PRESSURE: 73 MMHG | SYSTOLIC BLOOD PRESSURE: 133 MMHG

## 2018-11-26 DIAGNOSIS — I10 ESSENTIAL HYPERTENSION: ICD-10-CM

## 2018-11-26 DIAGNOSIS — M85.80 OSTEOPENIA, UNSPECIFIED LOCATION: ICD-10-CM

## 2018-11-26 DIAGNOSIS — M79.7 FIBROMYALGIA: ICD-10-CM

## 2018-11-26 DIAGNOSIS — E03.9 HYPOTHYROIDISM, UNSPECIFIED TYPE: Primary | ICD-10-CM

## 2018-11-26 DIAGNOSIS — Z78.0 POSTMENOPAUSAL: ICD-10-CM

## 2018-11-26 DIAGNOSIS — E88.810 DYSMETABOLIC SYNDROME: ICD-10-CM

## 2018-11-26 DIAGNOSIS — F32.1 CURRENT MODERATE EPISODE OF MAJOR DEPRESSIVE DISORDER WITHOUT PRIOR EPISODE: ICD-10-CM

## 2018-11-26 DIAGNOSIS — E03.9 HYPOTHYROIDISM, UNSPECIFIED TYPE: ICD-10-CM

## 2018-11-26 DIAGNOSIS — E78.5 HYPERLIPIDEMIA, UNSPECIFIED HYPERLIPIDEMIA TYPE: ICD-10-CM

## 2018-11-26 DIAGNOSIS — G47.33 OSA ON CPAP: ICD-10-CM

## 2018-11-26 DIAGNOSIS — E78.00 HYPERCHOLESTEROLEMIA: ICD-10-CM

## 2018-11-26 DIAGNOSIS — K21.9 GASTROESOPHAGEAL REFLUX DISEASE WITHOUT ESOPHAGITIS: ICD-10-CM

## 2018-11-26 DIAGNOSIS — F41.9 ANXIETY: ICD-10-CM

## 2018-11-26 DIAGNOSIS — E04.1 NODULAR THYROID DISEASE: ICD-10-CM

## 2018-11-26 DIAGNOSIS — E66.9 OBESITY (BMI 30-39.9): ICD-10-CM

## 2018-11-26 DIAGNOSIS — E06.3 HASHIMOTO'S THYROIDITIS: ICD-10-CM

## 2018-11-26 DIAGNOSIS — E11.65 TYPE 2 DIABETES MELLITUS WITH HYPERGLYCEMIA, WITHOUT LONG-TERM CURRENT USE OF INSULIN: ICD-10-CM

## 2018-11-26 DIAGNOSIS — G43.909 MIGRAINE WITHOUT STATUS MIGRAINOSUS, NOT INTRACTABLE, UNSPECIFIED MIGRAINE TYPE: ICD-10-CM

## 2018-11-26 LAB
25(OH)D3+25(OH)D2 SERPL-MCNC: 35 NG/ML
CA-I BLDV-SCNC: 1.21 MMOL/L
CHOLEST SERPL-MCNC: 124 MG/DL
CHOLEST/HDLC SERPL: 2.1 {RATIO}
ESTIMATED AVG GLUCOSE: 120 MG/DL
HBA1C MFR BLD HPLC: 5.8 %
HDLC SERPL-MCNC: 59 MG/DL
HDLC SERPL: 47.6 %
LDLC SERPL CALC-MCNC: 44.4 MG/DL
NONHDLC SERPL-MCNC: 65 MG/DL
PTH-INTACT SERPL-MCNC: 64 PG/ML
T3 SERPL-MCNC: 96 NG/DL
T4 FREE SERPL-MCNC: 1.12 NG/DL
TRIGL SERPL-MCNC: 103 MG/DL
TSH SERPL DL<=0.005 MIU/L-ACNC: 2.07 UIU/ML
URATE SERPL-MCNC: 4.7 MG/DL

## 2018-11-26 PROCEDURE — 83036 HEMOGLOBIN GLYCOSYLATED A1C: CPT

## 2018-11-26 PROCEDURE — 84439 ASSAY OF FREE THYROXINE: CPT

## 2018-11-26 PROCEDURE — 84378 SUGARS SINGLE QUANT: CPT

## 2018-11-26 PROCEDURE — 84480 ASSAY TRIIODOTHYRONINE (T3): CPT

## 2018-11-26 PROCEDURE — 83970 ASSAY OF PARATHORMONE: CPT

## 2018-11-26 PROCEDURE — 1101F PT FALLS ASSESS-DOCD LE1/YR: CPT | Mod: CPTII,S$GLB,, | Performed by: INTERNAL MEDICINE

## 2018-11-26 PROCEDURE — 86800 THYROGLOBULIN ANTIBODY: CPT

## 2018-11-26 PROCEDURE — 84550 ASSAY OF BLOOD/URIC ACID: CPT

## 2018-11-26 PROCEDURE — 82985 ASSAY OF GLYCATED PROTEIN: CPT

## 2018-11-26 PROCEDURE — 3078F DIAST BP <80 MM HG: CPT | Mod: CPTII,S$GLB,, | Performed by: INTERNAL MEDICINE

## 2018-11-26 PROCEDURE — 84443 ASSAY THYROID STIM HORMONE: CPT

## 2018-11-26 PROCEDURE — 3075F SYST BP GE 130 - 139MM HG: CPT | Mod: CPTII,S$GLB,, | Performed by: INTERNAL MEDICINE

## 2018-11-26 PROCEDURE — 3044F HG A1C LEVEL LT 7.0%: CPT | Mod: CPTII,S$GLB,, | Performed by: INTERNAL MEDICINE

## 2018-11-26 PROCEDURE — 99214 OFFICE O/P EST MOD 30 MIN: CPT | Mod: S$GLB,,, | Performed by: INTERNAL MEDICINE

## 2018-11-26 PROCEDURE — 99999 PR PBB SHADOW E&M-EST. PATIENT-LVL IV: CPT | Mod: PBBFAC,,, | Performed by: INTERNAL MEDICINE

## 2018-11-26 PROCEDURE — 80061 LIPID PANEL: CPT

## 2018-11-26 PROCEDURE — 82330 ASSAY OF CALCIUM: CPT

## 2018-11-26 PROCEDURE — 82306 VITAMIN D 25 HYDROXY: CPT

## 2018-11-26 RX ORDER — DULOXETIN HYDROCHLORIDE 60 MG/1
60 CAPSULE, DELAYED RELEASE ORAL 2 TIMES DAILY
Qty: 180 CAPSULE | Refills: 1 | Status: CANCELLED | OUTPATIENT
Start: 2018-11-26

## 2018-11-26 RX ORDER — NAPROXEN SODIUM 220 MG/1
81 TABLET, FILM COATED ORAL DAILY
Refills: 0 | COMMUNITY
Start: 2018-11-26 | End: 2019-11-26

## 2018-11-26 RX ORDER — DULOXETIN HYDROCHLORIDE 60 MG/1
60 CAPSULE, DELAYED RELEASE ORAL DAILY
Qty: 90 CAPSULE | Refills: 3 | Status: SHIPPED | OUTPATIENT
Start: 2018-11-26 | End: 2019-02-26 | Stop reason: SDUPTHER

## 2018-11-26 NOTE — TELEPHONE ENCOUNTER
----- Message from Cheyenne Servin sent at 11/26/2018 11:47 AM CST -----  Name of Who is Calling: [facility] ([facility])    What is the request in detail: would like to discuss the Rx fpr DULoxetine (CYMBALTA) 60 MG capsule    Can the clinic reply by [facility]:   No       What Number to Call Back if not [facility]:[phone]

## 2018-11-28 ENCOUNTER — HOSPITAL ENCOUNTER (OUTPATIENT)
Dept: RADIOLOGY | Facility: CLINIC | Age: 68
Discharge: HOME | End: 2018-11-28
Attending: INTERNAL MEDICINE
Payer: MEDICARE

## 2018-11-28 DIAGNOSIS — E04.1 NODULAR THYROID DISEASE: ICD-10-CM

## 2018-11-28 LAB
THRYOGLOBULIN INTERPRETATION: ABNORMAL
THYROGLOB AB SERPL-ACNC: 32 IU/ML
THYROGLOB SERPL-MCNC: <0.1 NG/ML

## 2018-11-28 PROCEDURE — 76536 US EXAM OF HEAD AND NECK: CPT | Mod: TC,PO

## 2018-11-28 PROCEDURE — 76536 US EXAM OF HEAD AND NECK: CPT | Mod: 26,,, | Performed by: RADIOLOGY

## 2018-11-30 LAB — FRUCTOSAMINE SERPL-SCNC: 282 UMOL /L

## 2018-12-01 LAB — GLYCOMARK (TM): 27.8 UG/ML

## 2018-12-03 ENCOUNTER — OFFICE VISIT (OUTPATIENT)
Dept: ORTHOPEDICS | Facility: CLINIC | Age: 68
End: 2018-12-03
Payer: MEDICARE

## 2018-12-03 VITALS
SYSTOLIC BLOOD PRESSURE: 146 MMHG | DIASTOLIC BLOOD PRESSURE: 70 MMHG | BODY MASS INDEX: 33.86 KG/M2 | HEIGHT: 62 IN | WEIGHT: 184 LBS | HEART RATE: 73 BPM

## 2018-12-03 DIAGNOSIS — S83.241D ACUTE MEDIAL MENISCAL TEAR, RIGHT, SUBSEQUENT ENCOUNTER: Primary | ICD-10-CM

## 2018-12-03 DIAGNOSIS — M70.62 TROCHANTERIC BURSITIS, LEFT HIP: ICD-10-CM

## 2018-12-03 PROCEDURE — 99213 OFFICE O/P EST LOW 20 MIN: CPT | Mod: 24,25,S$GLB, | Performed by: ORTHOPAEDIC SURGERY

## 2018-12-03 PROCEDURE — 20610 DRAIN/INJ JOINT/BURSA W/O US: CPT | Mod: 79,LT,S$GLB, | Performed by: ORTHOPAEDIC SURGERY

## 2018-12-03 PROCEDURE — 99999 PR PBB SHADOW E&M-EST. PATIENT-LVL III: CPT | Mod: PBBFAC,,, | Performed by: ORTHOPAEDIC SURGERY

## 2018-12-03 PROCEDURE — 3078F DIAST BP <80 MM HG: CPT | Mod: CPTII,S$GLB,, | Performed by: ORTHOPAEDIC SURGERY

## 2018-12-03 PROCEDURE — 1101F PT FALLS ASSESS-DOCD LE1/YR: CPT | Mod: CPTII,S$GLB,, | Performed by: ORTHOPAEDIC SURGERY

## 2018-12-03 PROCEDURE — 3077F SYST BP >= 140 MM HG: CPT | Mod: CPTII,S$GLB,, | Performed by: ORTHOPAEDIC SURGERY

## 2018-12-03 RX ORDER — TRIAMCINOLONE ACETONIDE 40 MG/ML
40 INJECTION, SUSPENSION INTRA-ARTICULAR; INTRAMUSCULAR
Status: DISCONTINUED | OUTPATIENT
Start: 2018-12-03 | End: 2018-12-03 | Stop reason: HOSPADM

## 2018-12-03 RX ADMIN — TRIAMCINOLONE ACETONIDE 40 MG: 40 INJECTION, SUSPENSION INTRA-ARTICULAR; INTRAMUSCULAR at 02:12

## 2018-12-03 NOTE — PROCEDURES
Large Joint Aspiration/Injection: L greater trochanteric bursa  Date/Time: 12/3/2018 2:59 PM  Performed by: Pradeep Cuellar MD  Authorized by: Pradeep Cuellar MD     Consent Done?:  Yes (Verbal)  Indications:  Pain  Procedure site marked: Yes    Timeout: Prior to procedure the correct patient, procedure, and site was verified      Location:  Hip  Site:  L greater trochanteric bursa  Prep: Patient was prepped and draped in usual sterile fashion    Ultrasonic Guidance for needle placement: No  Needle size:  20 G  Approach:  Lateral  Medications:  40 mg triamcinolone acetonide 40 mg/mL  Patient tolerance:  Patient tolerated the procedure well with no immediate complications

## 2018-12-03 NOTE — PROGRESS NOTES
Past Medical History:   Diagnosis Date    Anxiety     Cataract     Depression     Diabetes mellitus     managing with diet    Dizzy     Fibromyalgia     GERD (gastroesophageal reflux disease)     History of bladder infections     Hypertension     Hypothyroid     IBS (irritable bowel syndrome)     Kidney stones     Meniere disease     Migraine     Muscle spasms of head and/or neck     and lower ext    Osteoarthritis     PONV (postoperative nausea and vomiting)     severe-cause by morphine per pt    Sleep apnea     CPAP       Past Surgical History:   Procedure Laterality Date    [id number] Left 8/3/2018    Performed by [facility]ers [facility] at [facility] OR    ARTHROSCOPY, KNEE Right 10/15/2013    Performed by Edgar Kumar Jr., MD at [facility] OR    ARTHROSCOPY, KNEE, WITH MENISCECTOMY Right 9/18/2018    [facility] by [facility] OR    BLADDER SUSPENSION      BLOCK-NERVE Left 6/17/2016    Per[facility] by [facility]ers [facility] at [facility] OR    BLOCK-NERVE-MEDIAL BRANCH-LUMBAR Bilateral 8/1/2014    Performed by Delmar Rosas MD at [facility] OR    BREAST BIOPSY      CHONDRECTOMY, KNEE, SEMILUNAR CARTILAGE Right 10/15/2013    Performed by Edgar Kumar Jr., MD at [facility] OR    CHONDROPLASTY-CONDYLE Right 10/15/2013    Performed by [facility] Jr[facility] at [facility] OR    COLONOSCOPY  8/27/14    Dr. Thomas, 5 year recheck    COLONOSCOPY N/A 8/27/2014    Performed by Luis [facility] at [facility] ENDO    dental implant      upper RT implant    EGD (ESOPHAGOGASTRODUODENOSCOPY) N/A 4/15/2013    [facility]formed [facility] at [facility] ENDO    ESOPHAGOGASTRODUODENOSCOPY (EGD) N/A 6/27/2016    [facility] at [facility] ENDO    EXCISION OF MEDIAL MENISCUS OF KNEE Right 9/18/2018    Procedure: MENISCECTOMY, KNEE, MEDIAL;  Surgeon: Pradeep Cuellar MD;  Location: [facility];  Service: Orthopedics;  Laterality: Right;  medial and lateral    EYE SURGERY       bilateral PHACO with IOL    FOOT SURGERY Bilateral 12/05/2008    bunionectomy    FRACTURE SURGERY      HYSTERECTOMY      INJECTION-STEROID-EPIDURAL-LUMBAR N/A 12/23/2014    [facility] OR    JOINT REPLACEMENT Left     Partial knee    JOINT REPLACEMENT Left     Total knee replacement    KNEE ARTHROSCOPY Bilateral     KNEE ARTHROSCOPY W/ MENISCECTOMY Right 9/18/2018    Procedure: ARTHROSCOPY, KNEE, WITH MENISCECTOMY;  Surgeon: Pradeep Cuellar MD;  Location: [facility] OR;  Service: Orthopedics;  Laterality: Right;    MANDIBLE FRACTURE SURGERY      MENISCECTOMY, KNEE, MEDIAL Right 9/18/2018    Performed by [facility] at [facility] OR    MULTIPLE TOOTH EXTRACTIONS      radiofrequency ablation of the genicular branches of the left knee (4) Left 8/12/2016    [facility] at [facility]    RADIOFREQUENCY THERMOCOAGULATION (RFTC)-NERVE-MEDIAN BRANCH-LUMBAR Bilateral 2/16/2018    [facility]formed [facility] at [facility] OR    RADIOFREQUENCY THERMOCOAGULATION (RFTC)-NERVE-MEDIAN BRANCH-LUMBAR Bilateral 8/8/2014    [facility]formed by [facility] at [facility] OR    revision of mesh      repair to bladder suspension    SYNOVECTOMY, KNEE Right 10/15/2013    Per[facility] by [facility] at [facility] OR    TONSILLECTOMY, ADENOIDECTOMY      UPPER GASTROINTESTINAL ENDOSCOPY      VAGINAL DELIVERY      times 2       Current Outpatient Medications   Medication Sig    aspirin 81 MG Chew Take 1 tablet (81 mg total) by mouth once daily.    atorvastatin (LIPITOR) 40 MG tablet TAKE 1 TABLET EVERY DAY    b complex vitamins tablet Take 1 tablet by mouth once daily.    BD ALCOHOL SWABS PadM     buPROPion (WELLBUTRIN XL) 300 MG 24 hr tablet Take 1 tablet (300 mg total) by mouth once daily.    chlordiazepoxide-clidinium 5-2.5 mg (LIBRAX) 5-2.5 mg Cap Take by mouth. 2 qam, 1 q noon, 1 qhs    DULoxetine (CYMBALTA) 60 MG capsule Take 1 capsule (60 mg total) by mouth once daily.     ergocalciferol (VITAMIN D2) 50,000 unit Cap Take 50,000 Units by mouth every 7 days.    esomeprazole (NEXIUM) 40 MG capsule TAKE 1 CAPSULE EVERY DAY    furosemide (LASIX) 20 MG tablet Take 1 tablet (20 mg total) by mouth once daily.    gabapentin (NEURONTIN) 100 MG capsule TAKE 1 CAPSULE BY MOUTH ONCE DAILY    gabapentin (NEURONTIN) 100 MG capsule Take 1 capsule (100 mg total) by mouth once daily.    gabapentin (NEURONTIN) 400 MG capsule Take 1 capsule (400 mg total) by mouth every evening.    levothyroxine (SYNTHROID) 75 MCG tablet TAKE 1 TABLET DAILY BEFORE BREAKFAST    lisinopril (PRINIVIL,ZESTRIL) 5 MG tablet TAKE 1 TABLET ONE TIME DAILY    metoprolol succinate (TOPROL-XL) 25 MG 24 hr tablet Take 1 tablet (25 mg total) by mouth once daily.    topiramate (TOPAMAX) 50 MG tablet TAKE 1 TABLET EVERY DAY    TRUE METRIX AIR GLUCOSE METER kit     TRUE METRIX GLUCOSE TEST STRIP Strp     TRUE METRIX LEVEL 1 Soln     TRUEPLUS LANCETS 28 gauge Misc      No current facility-administered medications for this visit.        Review of patient's allergies indicates:   Allergen Reactions    Sulfa (sulfonamide antibiotics) Hives, Itching and Rash    Penicillins      Other reaction(s): Unknown. Childhood reaction. Pt does not remember reaction.    Adhesive Itching and Rash     Paper tape OK    Garlic Diarrhea    Morphine Nausea And Vomiting       Family History   Problem Relation Age of Onset    Diabetes Mother     Heart disease Mother     Kidney disease Mother     Hypertension Mother     Hyperlipidemia Mother     Diabetes Mellitus Mother     Heart disease Father     Diabetes Father     Hypertension Father     Hyperlipidemia Father     Diabetes Mellitus Father     COPD Father     Heart disease Brother     Cancer Brother     Early death Brother     Stomach cancer Brother     Breast cancer Maternal Aunt     Heart disease Brother     Ovarian cancer Neg Hx     Cataracts Neg Hx     Glaucoma Neg Hx      Macular degeneration Neg Hx     Retinal detachment Neg Hx     Thyroid disease Neg Hx     Stroke Neg Hx     Rheum arthritis Neg Hx     Psoriasis Neg Hx     Osteoarthritis Neg Hx     Lupus Neg Hx     Inflammatory bowel disease Neg Hx     Depression Neg Hx     Chronic back pain Neg Hx     Asthma Neg Hx        Social History     Socioeconomic History    Marital status:      Spouse name: Not on file    Number of children: Not on file    Years of education: Not on file    Highest education level: Not on file   Social Needs    Financial resource strain: Not on file    Food insecurity - worry: Not on file    Food insecurity - inability: Not on file    Transportation needs - medical: Not on file    Transportation needs - non-medical: Not on file   Occupational History    Not on file   Tobacco Use    Smoking status: Former Smoker     Packs/day: 0.25     Years: 1.00     Pack years: 0.25     Last attempt to quit: 10/15/1966     Years since quittin.1    Smokeless tobacco: Never Used   Substance and Sexual Activity    Alcohol use: Yes     Comment: occasionally    Drug use: No    Sexual activity: Not Currently     Partners: Male     Birth control/protection: Post-menopausal, Surgical   Other Topics Concern    Not on file   Social History Narrative    Not on file       Chief Complaint:   Chief Complaint   Patient presents with    Knee Pain     R knee 4wk f/u       Date of surgery:  2018    History of present illness:  68-year-old female who underwent knee arthroscopy.  Patient had pretty severe arthritic change of her right knee.  Pain is a 2/10.  No erythema or drainage.  Good range of motion. Complaining more lateral hip and leg pain. Difficulty laying down night.  Difficulty getting up and down out of a chair.      Review of Systems:    Musculoskeletal:  See HPI        Physical Examination:    Vital Signs:    Vitals:    18 1435   BP: (!) 146/70   Pulse: 73        Body mass index is 33.65 kg/m².    This a well-developed, well nourished patient in no acute distress.  They are alert and oriented and cooperative to examination.  Pt. walks without an antalgic gait.      Examination of the right knee shows healed  surgical portals.  No erythema or drainage.  Patient has full range of motion. No calf pain. Negative Homans sign.  Mild patellofemoral crepitus.    Examination of the patient's left hip shows full range of motion with flexion to 160°, extension to 0, external rotation to 50°, internal rotation of 15°, abduction of 50°, adduction of 15°. Skin has no rashes or bruising. Patient has negative Stinchfield exam. Patient has negative straight leg raise.Negative internal impingement test. Negative JYADON test. Negative Julisa's test. Patient has no pain with hip range of motion.  Moderately tender to palpation over the greater trochanteric bursa. Patient is 5 out of 5 motor strength, palpable distal pulses, and intact light touch sensation.       X-rays:  None     Assessment::  Status post right partial medial meniscectomy with 3 compartment chondroplasty  Left trochanteric bursitis    Plan:  I reviewed the findings with her today.  Continue with the exercises.  I offered her an injection for left hip pain. She happily agreed.  Follow up in 8 weeks.    This note was created using [employer] voice recognition software that occasionally misinterpreted phrases or words.

## 2018-12-13 ENCOUNTER — DOCUMENTATION ONLY (OUTPATIENT)
Dept: FAMILY MEDICINE | Facility: CLINIC | Age: 68
End: 2018-12-13

## 2018-12-27 ENCOUNTER — OFFICE VISIT (OUTPATIENT)
Dept: FAMILY MEDICINE | Facility: CLINIC | Age: 68
End: 2018-12-27
Attending: FAMILY MEDICINE
Payer: MEDICARE

## 2018-12-27 VITALS
DIASTOLIC BLOOD PRESSURE: 72 MMHG | SYSTOLIC BLOOD PRESSURE: 128 MMHG | BODY MASS INDEX: 34.64 KG/M2 | TEMPERATURE: 98 F | HEART RATE: 56 BPM | WEIGHT: 188.25 LBS | HEIGHT: 62 IN

## 2018-12-27 DIAGNOSIS — G45.9 TIA (TRANSIENT ISCHEMIC ATTACK): Primary | ICD-10-CM

## 2018-12-27 DIAGNOSIS — E11.65 TYPE 2 DIABETES MELLITUS WITH HYPERGLYCEMIA, WITHOUT LONG-TERM CURRENT USE OF INSULIN: ICD-10-CM

## 2018-12-27 DIAGNOSIS — I10 GOOD HYPERTENSION CONTROL: ICD-10-CM

## 2018-12-27 DIAGNOSIS — F32.A DEPRESSION, UNSPECIFIED DEPRESSION TYPE: ICD-10-CM

## 2018-12-27 DIAGNOSIS — Z23 IMMUNIZATION DUE: ICD-10-CM

## 2018-12-27 PROCEDURE — 1101F PT FALLS ASSESS-DOCD LE1/YR: CPT | Mod: CPTII,S$GLB,, | Performed by: FAMILY MEDICINE

## 2018-12-27 PROCEDURE — G0009 ADMIN PNEUMOCOCCAL VACCINE: HCPCS | Mod: S$GLB,,, | Performed by: FAMILY MEDICINE

## 2018-12-27 PROCEDURE — 99214 OFFICE O/P EST MOD 30 MIN: CPT | Mod: 25,S$GLB,, | Performed by: FAMILY MEDICINE

## 2018-12-27 PROCEDURE — 3044F HG A1C LEVEL LT 7.0%: CPT | Mod: CPTII,S$GLB,, | Performed by: FAMILY MEDICINE

## 2018-12-27 PROCEDURE — 3078F DIAST BP <80 MM HG: CPT | Mod: CPTII,S$GLB,, | Performed by: FAMILY MEDICINE

## 2018-12-27 PROCEDURE — 90732 PPSV23 VACC 2 YRS+ SUBQ/IM: CPT | Mod: S$GLB,,, | Performed by: FAMILY MEDICINE

## 2018-12-27 PROCEDURE — 99999 PR PBB SHADOW E&M-EST. PATIENT-LVL III: CPT | Mod: PBBFAC,,, | Performed by: FAMILY MEDICINE

## 2018-12-27 PROCEDURE — 3074F SYST BP LT 130 MM HG: CPT | Mod: CPTII,S$GLB,, | Performed by: FAMILY MEDICINE

## 2018-12-27 NOTE — PROGRESS NOTES
Subjective:       Patient ID: Franca Coffey is a 68 y.o. female.    Chief Complaint: Hospital followup and Hypertension    68-year-old female with extensive past medical history including type 2 diabetes, hypertension, hyperlipidemia, hypothyroidism, fibromyalgia, GERD, anxiety and depression, kidney stones, Meniere's syndrome, migraine headaches, osteoarthritis, sleep apnea and irritable bowel syndrome comes in as a hospital follow-up from a brief stay at [facility].  She presented to the hospital on December 10th in the emergency room and was discharged on December 12th.  She went in with a sensation of numbness and tingling in both hands that was followed by both arms turning blue in color.  The hospital stated she was in for chest pain and numbness down the right arm.  She had an extensive workup including CT a of chest CT of abdomen CT of head MRI of brain carotid ultrasounds Lexiscan stress test and numerous blood tests.  She was discharged with a diagnosis of TIA.  She was placed on low-dose aspirin at discharge. Due to elevated blood pressure her lisinopril was increased from 5 mg a day to 10 mg a day.  She was told to stop her metoprolol and told to stop her Cymbalta.  Subsequent to discontinuance of the Cymbalta she is become acutely depressed and with mood swings.  She was told that the Cymbalta had an interaction with other medications.  I am unable to find any significant interaction problem.  The patient was having no difficulties with that at the time of presentation.  The patient states she did not discontinue the metoprolol and remained on it and she has subsequently resumed her Cymbalta as of yesterday.  She has had no recurrence of the discoloration change in the arms but she continues to experience some numbness and tingling suggestive of carpal tunnel syndrome.  Some of her depression and mood swings appears to be related to the impending death of 1 of her dogs dying of heartworms.    Past  Medical History:  No date: Anxiety  No date: Cataract  No date: Depression  No date: Diabetes mellitus      Comment:  managing with diet  No date: Dizzy  No date: Fibromyalgia  No date: GERD (gastroesophageal reflux disease)  No date: History of bladder infections  No date: Hypertension  No date: Hypothyroid  No date: IBS (irritable bowel syndrome)  No date: Kidney stones  No date: Meniere disease  No date: Migraine  No date: Muscle spasms of head and/or neck      Comment:  and lower ext  No date: Osteoarthritis  No date: PONV (postoperative nausea and vomiting)      Comment:  severe-cause by morphine per pt  No date: Sleep apnea      Comment:  CPAP    Past Surgical History:  8/3/2018: [id number]; Left      Comment:  Performed by [facility]ers MD at [facility] OR  10/15/2013: ARTHROSCOPY, KNEE; Right      Comment:  Performed by Edgar Kumar Jr., MD at [facility] OR  9/18/2018: ARTHROSCOPY, KNEE, WITH MENISCECTOMY; Right      Comment:  Performed by Pradeep Cuellar MD at [facility] OR  No date: BLADDER SUSPENSION  6/17/2016: BLOCK-NERVE; Left      Comment:  Performed by Gee Byers MD at [facility] OR  8/1/2014: BLOCK-NERVE-MEDIAL BRANCH-LUMBAR; Bilateral      Comment:  Performed by Del[facility]as MD at [facility] OR  No date: BREAST BIOPSY  10/15/2013: CHONDRECTOMY, KNEE, SEMILUNAR CARTILAGE; Right      Comment:  Performed by Edgar Kumar Jr., MD at [facility] OR  10/15/2013: CHONDROPLASTY-CONDYLE; Right      Comment:  Performed by Edgar Kumar Jr., MD at [facility] OR  8/27/14: COLONOSCOPY      Comment:  Dr. Thomas, 5 year recheck  8/27/2014: COLONOSCOPY; N/A      Comment:  Performed by Luis Thomas MD at [facility] ENDO  No date: dental implant      Comment:  upper RT implant  4/15/2013: EGD (ESOPHAGOGASTRODUODENOSCOPY); N/A      Comment:  Performed by Luis Thomas MD at [facility] ENDO  6/27/2016: ESOPHAGOGASTRODUODENOSCOPY (EGD); N/A      Comment:  Performed by Luis Thomas MD at [facility] ENDO  No date: EYE  SURGERY      Comment:  bilateral PHACO with IOL  12/05/2008: FOOT SURGERY; Bilateral      Comment:  bunionectomy  No date: FRACTURE SURGERY  No date: HYSTERECTOMY  12/23/2014: INJECTION-STEROID-EPIDURAL-LUMBAR; N/A      Comment:  Performed by Delmar Rosas MD at [facility] OR  No date: JOINT REPLACEMENT; Left      Comment:  Partial knee  No date: JOINT REPLACEMENT; Left      Comment:  Total knee replacement  No date: KNEE ARTHROSCOPY; Bilateral  No date: MANDIBLE FRACTURE SURGERY  9/18/2018: MENISCECTOMY, KNEE, MEDIAL; Right      Comment:  Performed by Pradeep Cuellar MD at [facility] OR  No date: MULTIPLE TOOTH EXTRACTIONS  8/12/2016: radiofrequency ablation of the genicular branches of the   left knee (4); Left      Comment:  Performed by Gee Byers MD at [facility] OR  2/16/2018: RADIOFREQUENCY THERMOCOAGULATION (RFTC)-NERVE-MEDIAN   BRANCH-LUMBAR; Bilateral      Comment:  Performed by Delmar Rosas MD at [facility] OR  8/8/2014: RADIOFREQUENCY THERMOCOAGULATION (RFTC)-NERVE-MEDIAN BRANCH-  LUMBAR; Bilateral      Comment:  Performed by Delmar Rosas MD at [facility] OR  No date: revision of mesh      Comment:  repair to bladder suspension  10/15/2013: SYNOVECTOMY, KNEE; Right      Comment:  Performed by Edgar Kumar Jr., MD at [facility] OR  No date: TONSILLECTOMY, ADENOIDECTOMY  No date: UPPER GASTROINTESTINAL ENDOSCOPY  No date: VAGINAL DELIVERY      Comment:  times 2    Current Outpatient Medications on File Prior to Visit:  aspirin 81 MG Chew, Take 1 tablet (81 mg total) by mouth once daily., Disp: , Rfl: 0  atorvastatin (LIPITOR) 40 MG tablet, TAKE 1 TABLET EVERY DAY, Disp: 90 tablet, Rfl: 2  b complex vitamins tablet, Take 1 tablet by mouth once daily., Disp: , Rfl:   buPROPion (WELLBUTRIN XL) 300 MG 24 hr tablet, Take 1 tablet (300 mg total) by mouth once daily., Disp: 90 tablet, Rfl: 1  chlordiazepoxide-clidinium 5-2.5 mg (LIBRAX) 5-2.5 mg Cap, Take by mouth. 2 qam, 1 q noon, 1 qhs, Disp: , Rfl: 0  esomeprazole (NEXIUM) 40 MG  capsule, TAKE 1 CAPSULE EVERY DAY, Disp: 90 capsule, Rfl: 2  furosemide (LASIX) 20 MG tablet, Take 1 tablet (20 mg total) by mouth once daily., Disp: 90 tablet, Rfl: 1  gabapentin (NEURONTIN) 100 MG capsule, TAKE 1 CAPSULE BY MOUTH ONCE DAILY, Disp: 90 capsule, Rfl: 0  gabapentin (NEURONTIN) 400 MG capsule, Take 1 capsule (400 mg total) by mouth every evening., Disp: 90 capsule, Rfl: 1  levothyroxine (SYNTHROID) 75 MCG tablet, TAKE 1 TABLET DAILY BEFORE BREAKFAST, Disp: 90 tablet, Rfl: 3  lisinopril (PRINIVIL,ZESTRIL) 5 MG tablet, TAKE 1 TABLET ONE TIME DAILY, Disp: 90 tablet, Rfl: 0  metoprolol succinate (TOPROL-XL) 25 MG 24 hr tablet, Take 1 tablet (25 mg total) by mouth once daily., Disp: 90 tablet, Rfl: 1  topiramate (TOPAMAX) 50 MG tablet, TAKE 1 TABLET EVERY DAY, Disp: 90 tablet, Rfl: 3  TRUE METRIX AIR GLUCOSE METER kit, , Disp: , Rfl:   TRUE METRIX GLUCOSE TEST STRIP Strp, , Disp: , Rfl:   TRUE METRIX LEVEL 1 Soln, , Disp: , Rfl:   TRUEPLUS LANCETS 28 gauge Misc, , Disp: , Rfl:   DULoxetine (CYMBALTA) 60 MG capsule, Take 1 capsule (60 mg total) by mouth once daily., Disp: 90 capsule, Rfl: 3  ergocalciferol (VITAMIN D2) 50,000 unit Cap, Take 50,000 Units by mouth every 7 days., Disp: , Rfl:   (DISCONTINUED) BD ALCOHOL SWABS PadM, , Disp: , Rfl:   (DISCONTINUED) gabapentin (NEURONTIN) 100 MG capsule, Take 1 capsule (100 mg total) by mouth once daily., Disp: 90 capsule, Rfl: 1    No current facility-administered medications on file prior to visit.           Review of Systems   Respiratory: Negative for chest tightness and shortness of breath.    Cardiovascular: Negative for chest pain and palpitations.   Neurological: Positive for numbness.   Psychiatric/Behavioral: Positive for dysphoric mood. The patient is nervous/anxious.        Objective:      Physical Exam   Constitutional: She is oriented to person, place, and time. She appears well-developed. No distress.   Good blood pressure control  Obese with a BMI  of 34.4 she is up 10.2 lb from her June 21, 2018 visit   Cardiovascular: Normal rate, regular rhythm and normal heart sounds. Exam reveals no gallop and no friction rub.   No murmur heard.  Pulmonary/Chest: Effort normal and breath sounds normal. No stridor. No respiratory distress. She has no wheezes. She has no rales. She exhibits no tenderness.   Neurological: She is alert and oriented to person, place, and time.   Positive Tinel's and Phalen's tests right hand   Psychiatric: Her speech is normal and behavior is normal. Judgment and thought content normal. Her mood appears anxious. Her affect is labile. Her affect is not inappropriate. Cognition and memory are normal. She exhibits a depressed mood.   Nursing note and vitals reviewed.      Assessment:       1. TIA (transient ischemic attack)    2. Good hypertension control    3. Type 2 diabetes mellitus with hyperglycemia, without long-term current use of insulin    4. Depression, unspecified depression type    5. Immunization due        Plan:       1. TIA (transient ischemic attack)  Questionable based on her current description of her symptoms    2. Good hypertension control  Good control, no changes needed    3. Type 2 diabetes mellitus with hyperglycemia, without long-term current use of insulin  Good control with no changes needed  Lab Results   Component Value Date    HGBA1C 5.8 (H) 11/26/2018         4. Depression, unspecified depression type  Agree with resumption of Cymbalta    5. Immunization due  - (In Office Administered) Pneumococcal Polysaccharide Vaccine (23 Valent) (SQ/IM)

## 2019-01-04 DIAGNOSIS — F32.1 CURRENT MODERATE EPISODE OF MAJOR DEPRESSIVE DISORDER WITHOUT PRIOR EPISODE: ICD-10-CM

## 2019-01-04 RX ORDER — BUPROPION HYDROCHLORIDE 300 MG/1
TABLET ORAL
Qty: 90 TABLET | Refills: 1 | Status: SHIPPED | OUTPATIENT
Start: 2019-01-04 | End: 2019-04-23 | Stop reason: SDUPTHER

## 2019-01-09 ENCOUNTER — OFFICE VISIT (OUTPATIENT)
Dept: PHYSICAL MEDICINE AND REHAB | Facility: CLINIC | Age: 69
End: 2019-01-09
Payer: MEDICARE

## 2019-01-09 VITALS
SYSTOLIC BLOOD PRESSURE: 140 MMHG | BODY MASS INDEX: 34.6 KG/M2 | HEIGHT: 62 IN | HEART RATE: 63 BPM | WEIGHT: 188 LBS | DIASTOLIC BLOOD PRESSURE: 81 MMHG

## 2019-01-09 DIAGNOSIS — M51.36 LUMBAR DEGENERATIVE DISC DISEASE: ICD-10-CM

## 2019-01-09 DIAGNOSIS — M67.952 TENDINOPATHY OF LEFT GLUTEAL REGION: Primary | ICD-10-CM

## 2019-01-09 PROCEDURE — 3077F PR MOST RECENT SYSTOLIC BLOOD PRESSURE >= 140 MM HG: ICD-10-PCS | Mod: CPTII,S$GLB,, | Performed by: PHYSICAL MEDICINE & REHABILITATION

## 2019-01-09 PROCEDURE — 1101F PR PT FALLS ASSESS DOC 0-1 FALLS W/OUT INJ PAST YR: ICD-10-PCS | Mod: CPTII,S$GLB,, | Performed by: PHYSICAL MEDICINE & REHABILITATION

## 2019-01-09 PROCEDURE — 3079F DIAST BP 80-89 MM HG: CPT | Mod: CPTII,S$GLB,, | Performed by: PHYSICAL MEDICINE & REHABILITATION

## 2019-01-09 PROCEDURE — 3077F SYST BP >= 140 MM HG: CPT | Mod: CPTII,S$GLB,, | Performed by: PHYSICAL MEDICINE & REHABILITATION

## 2019-01-09 PROCEDURE — 99214 OFFICE O/P EST MOD 30 MIN: CPT | Mod: S$GLB,,, | Performed by: PHYSICAL MEDICINE & REHABILITATION

## 2019-01-09 PROCEDURE — 99999 PR PBB SHADOW E&M-EST. PATIENT-LVL III: ICD-10-PCS | Mod: PBBFAC,,, | Performed by: PHYSICAL MEDICINE & REHABILITATION

## 2019-01-09 PROCEDURE — 3079F PR MOST RECENT DIASTOLIC BLOOD PRESSURE 80-89 MM HG: ICD-10-PCS | Mod: CPTII,S$GLB,, | Performed by: PHYSICAL MEDICINE & REHABILITATION

## 2019-01-09 PROCEDURE — 1101F PT FALLS ASSESS-DOCD LE1/YR: CPT | Mod: CPTII,S$GLB,, | Performed by: PHYSICAL MEDICINE & REHABILITATION

## 2019-01-09 PROCEDURE — 99999 PR PBB SHADOW E&M-EST. PATIENT-LVL III: CPT | Mod: PBBFAC,,, | Performed by: PHYSICAL MEDICINE & REHABILITATION

## 2019-01-09 PROCEDURE — 99214 PR OFFICE/OUTPT VISIT, EST, LEVL IV, 30-39 MIN: ICD-10-PCS | Mod: S$GLB,,, | Performed by: PHYSICAL MEDICINE & REHABILITATION

## 2019-01-09 RX ORDER — LISINOPRIL 10 MG/1
TABLET ORAL
COMMUNITY
Start: 2019-01-04 | End: 2019-02-26 | Stop reason: SDUPTHER

## 2019-01-09 RX ORDER — CELECOXIB 100 MG/1
100 CAPSULE ORAL 2 TIMES DAILY
Qty: 60 CAPSULE | Refills: 1 | Status: SHIPPED | OUTPATIENT
Start: 2019-01-09

## 2019-01-09 NOTE — PROGRESS NOTES
[facility]    CHIEF COMPLAINT:   Chief Complaint   Patient presents with    Follow-up     hip left pain     HISTORY OF PRESENT ILLNESS: Franca Coffey is a 68 y.o. female who presents to me in follow-up for new onset left hip pain. She previously was seen on 8/27/18 s/p left knee genicular nerve RFA performed on 8/3/18.  She reports her knee pain is manageable at this point.  Her primary complaint today is left hip pain.  I have seen her specifically for this issue in the past and we did performed injection of corticosteroid to the area.  She reports significant relief following that injection.  She was recently seen by Dr. Cuellar for left hip pain just a few weeks ago.  He performed an injection of corticosteroid to the lateral left hip, however unfortunately she reports minimal pain reduction from this injection.  She rates her pain currently as a 10 on a scale of 1-10.  She locates the pain to lateral left hip as well as the posterior buttock area.  She notes pain that radiates from the hip area all the way down to the ankle.  She also notes a history of chronic low back pain and was told that she has several bulging discs.  She denies any significant numbness or tingling of the left lower extremity.  She does feel like the left leg is weak but denies any recent falls.  She has been using a cane for ambulation.  She is not currently taking any pain medicine.  She denies any popping, grinding, or snapping of the hip.  She has increased pain when lying on left side in bed.  She has not done any physical therapy for this issue, although we did recommend this to her at her initial visit.    Review of Systems   Constitutional: Negative for fever.   HENT: Negative for drooling.    Eyes: Negative for discharge.   Respiratory: Negative for choking.    Cardiovascular: Negative for chest pain.   Genitourinary: Negative for flank pain.   Skin: Negative for wound.   Allergic/Immunologic:  Negative for immunocompromised state.   Neurological: Negative for tremors and syncope.   Psychiatric/Behavioral: Negative for behavioral problems.     Past Medical History:   Past Medical History:   Diagnosis Date    Anxiety     Cataract     Depression     Diabetes mellitus     managing with diet    Dizzy     Fibromyalgia     GERD (gastroesophageal reflux disease)     History of bladder infections     Hypertension     Hypothyroid     IBS (irritable bowel syndrome)     Kidney stones     Meniere disease     Migraine     Muscle spasms of head and/or neck     and lower ext    Osteoarthritis     PONV (postoperative nausea and vomiting)     severe-cause by morphine per pt    Sleep apnea     CPAP       Past Surgical History:   Past Surgical History:   Procedure Laterality Date    FYSYVDAD-[id number]-NSLUGB Left 8/3/2018    [facility] OR    ARTHROSCOPY, KNEE Right 10/15/2013    [facility] by Edgar Kumar Jr., [facility] at [facility] OR    ARTHROSCOPY, KNEE, WITH MENISCECTOMY Right 9/18/2018    [facility] OR    BLADDER SUSPENSION      BLOCK-NERVE Left 6/17/2016    [facility] at [facility] OR    BLOCK-NERVE-MEDIAL BRANCH-LUMBAR Bilateral 8/1/2014    [facility] OR    BREAST BIOPSY      CHONDRECTOMY, KNEE, SEMILUNAR CARTILAGE Right 10/15/2013    [facility] by [facility] Jr[facility] at [facility] OR    CHONDROPLASTY-CONDYLE Right 10/15/2013    [facility] at [facility] OR    COLONOSCOPY  8/27/14    Dr. Thomas, 5 year recheck    COLONOSCOPY N/A 8/27/2014    [facility] ENDO    dental implant      upper RT implant    EGD (ESOPHAGOGASTRODUODENOSCOPY) N/A 4/15/2013    [facility] at [facility] ENDO    ESOPHAGOGASTRODUODENOSCOPY (EGD) N/A 6/27/2016    [facility] at [facility] ENDO    EYE SURGERY      bilateral PHACO with  IOL    FOOT SURGERY Bilateral 12/05/2008    bunionectomy    FRACTURE SURGERY      HYSTERECTOMY      INJECTION-STEROID-EPIDURAL-LUMBAR N/A 12/23/2014    [facility] OR    JOINT REPLACEMENT Left     Partial knee    JOINT REPLACEMENT Left     Total knee replacement    KNEE ARTHROSCOPY Bilateral     MANDIBLE FRACTURE SURGERY      MENISCECTOMY, KNEE, MEDIAL Right 9/18/2018    [facility]    MULTIPLE TOOTH EXTRACTIONS      radiofrequency ablation of the genicular branches of the left knee (4) Left 8/12/2016    [facility] at [facility] OR    RADIOFREQUENCY THERMOCOAGULATION (RFTC)-NERVE-MEDIAN BRANCH-LUMBAR Bilateral 2/16/2018    [facility] at [facility] OR    RADIOFREQUENCY THERMOCOAGULATION (RFTC)-NERVE-MEDIAN BRANCH-LUMBAR Bilateral 8/8/2014    [facility] at [facility] OR    revision of mesh      repair to bladder suspension    SYNOVECTOMY, KNEE Right 10/15/2013    [facility] by [facility] at [facility] OR    TONSILLECTOMY, ADENOIDECTOMY      UPPER GASTROINTESTINAL ENDOSCOPY      VAGINAL DELIVERY      times 2       Family History:   Family History   Problem Relation Age of Onset    Diabetes Mother     Heart disease Mother     Kidney disease Mother     Hypertension Mother     Hyperlipidemia Mother     Diabetes Mellitus Mother     Heart disease Father     Diabetes Father     Hypertension Father     Hyperlipidemia Father     Diabetes Mellitus Father     COPD Father     Heart disease Brother     Cancer Brother     Early death Brother     Stomach cancer Brother     Breast cancer Maternal Aunt     Heart disease Brother     Ovarian cancer Neg Hx     Cataracts Neg Hx     Glaucoma Neg Hx     Macular degeneration Neg Hx     Retinal detachment Neg Hx     Thyroid disease Neg Hx     Stroke Neg Hx     Rheum arthritis Neg Hx     Psoriasis Neg Hx     Osteoarthritis Neg Hx     Lupus  Neg Hx     Inflammatory bowel disease Neg Hx     Depression Neg Hx     Chronic back pain Neg Hx     Asthma Neg Hx        Medications:   Current Outpatient Medications on File Prior to Visit   Medication Sig Dispense Refill    aspirin 81 MG Chew Take 1 tablet (81 mg total) by mouth once daily.  0    atorvastatin (LIPITOR) 40 MG tablet TAKE 1 TABLET EVERY DAY 90 tablet 2    b complex vitamins tablet Take 1 tablet by mouth once daily.      buPROPion (WELLBUTRIN XL) 300 MG 24 hr tablet TAKE 1 TABLET EVERY DAY 90 tablet 1    chlordiazepoxide-clidinium 5-2.5 mg (LIBRAX) 5-2.5 mg Cap Take by mouth. 2 qam, 1 q noon, 1 qhs  0    DULoxetine (CYMBALTA) 60 MG capsule Take 1 capsule (60 mg total) by mouth once daily. 90 capsule 3    ergocalciferol (VITAMIN D2) 50,000 unit Cap Take 50,000 Units by mouth every 7 days.      esomeprazole (NEXIUM) 40 MG capsule TAKE 1 CAPSULE EVERY DAY 90 capsule 2    furosemide (LASIX) 20 MG tablet Take 1 tablet (20 mg total) by mouth once daily. 90 tablet 1    gabapentin (NEURONTIN) 100 MG capsule TAKE 1 CAPSULE BY MOUTH ONCE DAILY 90 capsule 0    gabapentin (NEURONTIN) 400 MG capsule Take 1 capsule (400 mg total) by mouth every evening. 90 capsule 1    levothyroxine (SYNTHROID) 75 MCG tablet TAKE 1 TABLET DAILY BEFORE BREAKFAST 90 tablet 3    lisinopril (PRINIVIL,ZESTRIL) 5 MG tablet TAKE 1 TABLET ONE TIME DAILY 90 tablet 0    metoprolol succinate (TOPROL-XL) 25 MG 24 hr tablet Take 1 tablet (25 mg total) by mouth once daily. 90 tablet 1    topiramate (TOPAMAX) 50 MG tablet TAKE 1 TABLET EVERY DAY 90 tablet 3    TRUE METRIX AIR GLUCOSE METER kit       TRUE METRIX GLUCOSE TEST STRIP Strp       TRUE METRIX LEVEL 1 Soln       TRUEPLUS LANCETS 28 gauge Misc        No current facility-administered medications on file prior to visit.        Allergies:   Review of patient's allergies indicates:   Allergen Reactions    Sulfa (sulfonamide antibiotics) Hives, Itching and Rash     "Penicillins      Other reaction(s): Unknown. Childhood reaction. Pt does not remember reaction.    Adhesive Itching and Rash     Paper tape OK    Garlic Diarrhea    Morphine Nausea And Vomiting       Social History:   Social History     Socioeconomic History    Marital status:      Spouse name: Not on file    Number of children: Not on file    Years of education: Not on file    Highest education level: Not on file   Social Needs    Financial resource strain: Not on file    Food insecurity - worry: Not on file    Food insecurity - inability: Not on file    Transportation needs - medical: Not on file    Transportation needs - non-medical: Not on file   Occupational History    Not on file   Tobacco Use    Smoking status: Former Smoker     Packs/day: 0.25     Years: 1.00     Pack years: 0.25     Last attempt to quit: 10/15/1966     Years since quittin.2    Smokeless tobacco: Never Used   Substance and Sexual Activity    Alcohol use: Yes     Comment: occasionally    Drug use: No    Sexual activity: Not Currently     Partners: Male     Birth control/protection: Post-menopausal, Surgical   Other Topics Concern    Not on file   Social History Narrative    Not on file     PHYSICAL EXAMINATION:   General  BP (!) 140/81   Pulse 63   Ht 5' 2" (1.575 m)   Wt 85.3 kg (188 lb)   BMI 34.39 kg/m²   Constitutional: Oriented to person, place, and time. No apparent distress. Appears well-developed and well-nourished. Pleasant.  HENT:   Head: Normocephalic and atraumatic.   Eyes: Right eye exhibits no discharge. Left eye exhibits no discharge. No scleral icterus.   Pulmonary/Chest: Effort normal. No respiratory distress.   Abdominal: There is no guarding.   Neurological: Alert and oriented to person, place, and time.   Psychiatric: Behavior is normal.   Left Hip Exam     Tenderness   The patient is experiencing tenderness in the greater trochanter, ischial tuberosity, lateral and posterior.    Range " of Motion   Abduction: 40   Adduction: 25   Flexion: 130   External rotation: 70   Internal rotation: 20     Muscle Strength   Abduction: 4/5   Adduction: 5/5   Flexion: 4/5     Tests   JAYDON: positive    Other   Erythema: absent  Scars: absent  Sensation: normal  Pulse: present      Back Exam     Tenderness   The patient is experiencing tenderness in the sacroiliac.    Muscle Strength   Left Quadriceps:  5/5   Left Hamstrings:  5/5     Tests   Straight leg raise left: negative    Reflexes   Patellar: 2/4  Achilles: 2/4    Other   Sensation: normal  Gait: antalgic   Erythema: no back redness  Scars: absent        INSPECTION: There is no swelling, ecchymoses, erythema or gross deformity about the left hip.    Imaging  X-ray left hip from 01/25/2018: No acute fracture or dislocation. There is small enthesophyte formation along the greater trochanters bilaterally. No significant joint space loss.    Data Reviewed: X-ray    Supportive Actions: Independent visualization of images or test specimens    ASSESSMENT:   Encounter Diagnoses   Name Primary?    Tendinopathy of left gluteal region Yes    Lumbar degenerative disc disease      PLAN:     1.  We discussed that her pain may be multifactorial.  We discussed that recent injection to the left greater trochanteric area without any relief suggest pain emanating from elsewhere.  She does describe consistent pain that radiates all way down to her ankle.  We discussed that this could be lumbosacral in origin.  She does have a normal sensory exam and reflexes on today's exam.  She reports a history of low back issues.  I recommended she consult with Dr. Rosas for consideration of interventional spine procedures.  Also recommended formal physical therapy to address both her lumbar degenerative disc disease as well as left gluteal tendinopathy.  We will help facilitate her getting set up with physical therapy here at [facility].    2.  I prescribed Celebrex 100 mg to take twice a day  as needed for pain.    3.  Return to clinic in 6-8 weeks if not improved.    The above note was completed, in part, with the aid of Dragon dictation software/hardware. Translation errors may be present.

## 2019-01-10 ENCOUNTER — OFFICE VISIT (OUTPATIENT)
Dept: PAIN MEDICINE | Facility: CLINIC | Age: 69
End: 2019-01-10
Payer: MEDICARE

## 2019-01-10 VITALS
SYSTOLIC BLOOD PRESSURE: 159 MMHG | HEART RATE: 63 BPM | BODY MASS INDEX: 34.6 KG/M2 | DIASTOLIC BLOOD PRESSURE: 80 MMHG | WEIGHT: 188 LBS | HEIGHT: 62 IN

## 2019-01-10 DIAGNOSIS — M79.18 MYOFASCIAL PAIN: ICD-10-CM

## 2019-01-10 DIAGNOSIS — M47.896 OTHER SPONDYLOSIS, LUMBAR REGION: ICD-10-CM

## 2019-01-10 DIAGNOSIS — M54.16 LUMBAR RADICULITIS: ICD-10-CM

## 2019-01-10 DIAGNOSIS — M51.36 DDD (DEGENERATIVE DISC DISEASE), LUMBAR: Primary | ICD-10-CM

## 2019-01-10 PROCEDURE — 3077F SYST BP >= 140 MM HG: CPT | Mod: CPTII,S$GLB,, | Performed by: PHYSICIAN ASSISTANT

## 2019-01-10 PROCEDURE — 3079F DIAST BP 80-89 MM HG: CPT | Mod: CPTII,S$GLB,, | Performed by: PHYSICIAN ASSISTANT

## 2019-01-10 PROCEDURE — 99214 PR OFFICE/OUTPT VISIT, EST, LEVL IV, 30-39 MIN: ICD-10-PCS | Mod: S$GLB,,, | Performed by: PHYSICIAN ASSISTANT

## 2019-01-10 PROCEDURE — 99214 OFFICE O/P EST MOD 30 MIN: CPT | Mod: S$GLB,,, | Performed by: PHYSICIAN ASSISTANT

## 2019-01-10 PROCEDURE — 1101F PR PT FALLS ASSESS DOC 0-1 FALLS W/OUT INJ PAST YR: ICD-10-PCS | Mod: CPTII,S$GLB,, | Performed by: PHYSICIAN ASSISTANT

## 2019-01-10 PROCEDURE — 99999 PR PBB SHADOW E&M-EST. PATIENT-LVL IV: CPT | Mod: PBBFAC,,, | Performed by: PHYSICIAN ASSISTANT

## 2019-01-10 PROCEDURE — 3077F PR MOST RECENT SYSTOLIC BLOOD PRESSURE >= 140 MM HG: ICD-10-PCS | Mod: CPTII,S$GLB,, | Performed by: PHYSICIAN ASSISTANT

## 2019-01-10 PROCEDURE — 99999 PR PBB SHADOW E&M-EST. PATIENT-LVL IV: ICD-10-PCS | Mod: PBBFAC,,, | Performed by: PHYSICIAN ASSISTANT

## 2019-01-10 PROCEDURE — 1101F PT FALLS ASSESS-DOCD LE1/YR: CPT | Mod: CPTII,S$GLB,, | Performed by: PHYSICIAN ASSISTANT

## 2019-01-10 PROCEDURE — 3079F PR MOST RECENT DIASTOLIC BLOOD PRESSURE 80-89 MM HG: ICD-10-PCS | Mod: CPTII,S$GLB,, | Performed by: PHYSICIAN ASSISTANT
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
## 2019-01-10 NOTE — LETTER
January 10, 2019      Gee Byers MD  [address] [place] [address] Dr  [address]  [place] [address]           [facility]  [address] [place] [address] [address]  [place] [address]  Phone: [phone]  Fax: [phone]          Patient: Franca Coffey   MR Number: [id number]   YOB: 1950   Date of Visit: 1/10/2019       Dear Dr. Gee Byers:    Thank you for referring Franca Coffey to me for evaluation. Attached you will find relevant portions of my assessment and plan of care.    If you have questions, please do not hesitate to call me. I look forward to following Franca Coffey along with you.    Sincerely,    JARON Landrum  CC:  No Recipients    If you would like to receive this communication electronically, please contact [email or web address] or [phone] to request more information on [facility] access.    For providers and/or their staff who would like to refer a patient to [facility], please contact us through our one-stop-shop provider referral line, [facility], at [phone].    If you feel you have received this communication in error or would no longer like to receive these types of communications, please e-mail [email or web address]

## 2019-01-15 ENCOUNTER — CLINICAL SUPPORT (OUTPATIENT)
Dept: REHABILITATION | Facility: HOSPITAL | Age: 69
End: 2019-01-15
Attending: PHYSICAL MEDICINE & REHABILITATION
Payer: MEDICARE

## 2019-01-15 DIAGNOSIS — R26.9 GAIT ABNORMALITY: ICD-10-CM

## 2019-01-15 PROCEDURE — G8978 MOBILITY CURRENT STATUS: HCPCS | Mod: CL,PN

## 2019-01-15 PROCEDURE — G8979 MOBILITY GOAL STATUS: HCPCS | Mod: CJ,PN

## 2019-01-15 PROCEDURE — 97162 PT EVAL MOD COMPLEX 30 MIN: CPT | Mod: PN
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
## 2019-01-17 ENCOUNTER — HOSPITAL ENCOUNTER (OUTPATIENT)
Dept: RADIOLOGY | Facility: HOSPITAL | Age: 69
Discharge: HOME | End: 2019-01-17
Attending: PHYSICIAN ASSISTANT
Payer: MEDICARE

## 2019-01-17 DIAGNOSIS — M51.36 DDD (DEGENERATIVE DISC DISEASE), LUMBAR: ICD-10-CM

## 2019-01-17 PROCEDURE — 72148 MRI LUMBAR SPINE WITHOUT CONTRAST: ICD-10-PCS | Mod: 26,,, | Performed by: RADIOLOGY

## 2019-01-17 PROCEDURE — 72148 MRI LUMBAR SPINE W/O DYE: CPT | Mod: 26,,, | Performed by: RADIOLOGY

## 2019-01-17 PROCEDURE — 72148 MRI LUMBAR SPINE W/O DYE: CPT | Mod: TC

## 2019-01-18 ENCOUNTER — TELEPHONE (OUTPATIENT)
Dept: PAIN MEDICINE | Facility: CLINIC | Age: 69
End: 2019-01-18

## 2019-01-18 DIAGNOSIS — M54.16 LUMBAR RADICULOPATHY: Primary | ICD-10-CM

## 2019-01-18 NOTE — TELEPHONE ENCOUNTER
Informed patient of MRI results and recommendation per VALENTINO Sears. Patient scheduled for procedure on 2/4/19 instructions given and sent via [employer] per patient requests. Patient accepted and voiced understanding.

## 2019-01-21 PROBLEM — R26.9 GAIT ABNORMALITY: Status: ACTIVE | Noted: 2019-01-21

## 2019-01-24 ENCOUNTER — CLINICAL SUPPORT (OUTPATIENT)
Dept: REHABILITATION | Facility: HOSPITAL | Age: 69
End: 2019-01-24
Attending: PHYSICAL MEDICINE & REHABILITATION
Payer: MEDICARE

## 2019-01-24 DIAGNOSIS — R26.9 GAIT ABNORMALITY: ICD-10-CM

## 2019-01-24 PROCEDURE — 97110 THERAPEUTIC EXERCISES: CPT | Mod: PN

## 2019-01-24 NOTE — PROGRESS NOTES
"Name: Franca Kovacs [facility] Number: [id number]  Date of Treatment: 01/24/2019   Diagnosis:   Encounter Diagnosis   Name Primary?    Gait abnormality        Time in: 1210  Time Out: 1255  Total Treatment Time: 45 min.  Group Time: n/a      Subjective:    Franca reports symptoms and LB/L hip pain about the same, still radiating down into R leg..  Patient reports their pain to be 5/10 on a 0-10 scale with 0 being no pain and 10 being the worst pain imaginable.    Objective    Patient received individual therapy to increase strength, ROM and flexibility with 0 patients with activities as follows:     Franca received therapeutic exercises to develop strength, ROM and flexibility for 44 minutes including: Nu Step, Lv 2 x 8'; Stand GSS str, L/R, 1/2 roll, 2x30" ea; seated HSS, 2x30" ea; seated pirif. Str, 3x20" ea; seated ball sq./clams w/RTB, x30 ea; seated marches, x20; sup ITband str,, L/R, 3x20" ea; PPT, x 20; TrA w/PB, x20; DKTC w/PB, x 20.                                           Written Home Exercises Provided: not yet               Assessment:       Pt will continue to benefit from skilled PT intervention. Medical Necessity is demonstrated by:  Fall risk, Pain limits function of effected part for some activities, Unable to participate fully in daily activities and Weakness. Pt. Performed str's & exer's w/fair function and tolerance; w/no incr in pain; needed occas. vcues for proper technique.      Patient is making fair progress towards established goals.    New/Revised goals: N/A      Plan:  Continue with established Plan of Care towards PT goals.   "

## 2019-01-29 ENCOUNTER — TELEPHONE (OUTPATIENT)
Dept: ENDOCRINOLOGY | Facility: CLINIC | Age: 69
End: 2019-01-29

## 2019-01-29 NOTE — TELEPHONE ENCOUNTER
----- Message from Patricia Dick RN sent at 1/29/2019  4:30 PM CST -----  Contact: Laurie march with [employer]      ----- Message -----  From: Essence Krueger  Sent: 1/29/2019   1:56 PM  To: Nghia Dobson Staff    Patient is not taking medicine for her diabetes and just want to make sure this is correct and also that she does not check her blood sugar.  Since it has only been two months since her last labs is she due to be checked at this time. Patient thinks she is having trouble with her sugar, but doesn't check level.  She also does not go to therapy and her eye sight is getting worse.   Call back Margaret at [phone] ext [phone]

## 2019-02-04 ENCOUNTER — PATIENT MESSAGE (OUTPATIENT)
Dept: SURGERY | Facility: AMBULARY SURGERY CENTER | Age: 69
End: 2019-02-04

## 2019-02-04 ENCOUNTER — HOSPITAL ENCOUNTER (OUTPATIENT)
Facility: AMBULARY SURGERY CENTER | Age: 69
Discharge: HOME | End: 2019-02-04
Attending: ANESTHESIOLOGY | Admitting: ANESTHESIOLOGY
Payer: MEDICARE

## 2019-02-04 DIAGNOSIS — M51.36 DDD (DEGENERATIVE DISC DISEASE), LUMBAR: Primary | ICD-10-CM

## 2019-02-04 DIAGNOSIS — M54.16 LUMBAR RADICULITIS: ICD-10-CM

## 2019-02-04 LAB — POCT GLUCOSE: 60 MG/DL (ref 70–110)

## 2019-02-04 PROCEDURE — 99152 MOD SED SAME PHYS/QHP 5/>YRS: CPT | Mod: ,,, | Performed by: ANESTHESIOLOGY

## 2019-02-04 PROCEDURE — 64483 NJX AA&/STRD TFRM EPI L/S 1: CPT | Mod: LT,,, | Performed by: ANESTHESIOLOGY

## 2019-02-04 PROCEDURE — 64483 PR EPIDURAL INJ, ANES/STEROID, TRANSFORAMINAL, LUMB/SACR, SNGL LEVL: ICD-10-PCS | Mod: LT,,, | Performed by: ANESTHESIOLOGY

## 2019-02-04 PROCEDURE — 64483 NJX AA&/STRD TFRM EPI L/S 1: CPT | Performed by: ANESTHESIOLOGY

## 2019-02-04 PROCEDURE — 99152 PR MOD CONSCIOUS SEDATION, SAME PHYS, 5+ YRS, FIRST 15 MIN: ICD-10-PCS | Mod: ,,, | Performed by: ANESTHESIOLOGY

## 2019-02-04 RX ORDER — FENTANYL CITRATE 50 UG/ML
INJECTION, SOLUTION INTRAMUSCULAR; INTRAVENOUS
Status: DISCONTINUED
Start: 2019-02-04 | End: 2019-02-04 | Stop reason: HOSPADM

## 2019-02-04 RX ORDER — DEXAMETHASONE SODIUM PHOSPHATE 10 MG/ML
INJECTION INTRAMUSCULAR; INTRAVENOUS
Status: DISCONTINUED
Start: 2019-02-04 | End: 2019-02-04 | Stop reason: HOSPADM

## 2019-02-04 RX ORDER — MIDAZOLAM HYDROCHLORIDE 1 MG/ML
INJECTION INTRAMUSCULAR; INTRAVENOUS
Status: DISCONTINUED
Start: 2019-02-04 | End: 2019-02-04 | Stop reason: HOSPADM

## 2019-02-04 RX ORDER — FENTANYL CITRATE 50 UG/ML
INJECTION, SOLUTION INTRAMUSCULAR; INTRAVENOUS
Status: DISCONTINUED | OUTPATIENT
Start: 2019-02-04 | End: 2019-02-04 | Stop reason: HOSPADM

## 2019-02-04 RX ORDER — BUPIVACAINE HYDROCHLORIDE 2.5 MG/ML
INJECTION, SOLUTION EPIDURAL; INFILTRATION; INTRACAUDAL
Status: DISCONTINUED | OUTPATIENT
Start: 2019-02-04 | End: 2019-02-04 | Stop reason: HOSPADM

## 2019-02-04 RX ORDER — MIDAZOLAM HYDROCHLORIDE 1 MG/ML
INJECTION INTRAMUSCULAR; INTRAVENOUS
Status: DISCONTINUED | OUTPATIENT
Start: 2019-02-04 | End: 2019-02-04 | Stop reason: HOSPADM

## 2019-02-04 RX ORDER — HYDROCODONE BITARTRATE AND ACETAMINOPHEN 7.5; 325 MG/1; MG/1
1 TABLET ORAL EVERY 6 HOURS PRN
Qty: 25 TABLET | Refills: 0 | Status: SHIPPED | OUTPATIENT
Start: 2019-02-04 | End: 2019-02-05 | Stop reason: SDUPTHER

## 2019-02-04 RX ORDER — LIDOCAINE HYDROCHLORIDE 10 MG/ML
INJECTION, SOLUTION EPIDURAL; INFILTRATION; INTRACAUDAL; PERINEURAL
Status: DISCONTINUED | OUTPATIENT
Start: 2019-02-04 | End: 2019-02-04 | Stop reason: HOSPADM

## 2019-02-04 RX ORDER — DEXAMETHASONE SODIUM PHOSPHATE 10 MG/ML
INJECTION INTRAMUSCULAR; INTRAVENOUS
Status: DISCONTINUED | OUTPATIENT
Start: 2019-02-04 | End: 2019-02-04 | Stop reason: HOSPADM

## 2019-02-04 RX ORDER — SODIUM CHLORIDE, SODIUM LACTATE, POTASSIUM CHLORIDE, CALCIUM CHLORIDE 600; 310; 30; 20 MG/100ML; MG/100ML; MG/100ML; MG/100ML
INJECTION, SOLUTION INTRAVENOUS ONCE AS NEEDED
Status: COMPLETED | OUTPATIENT
Start: 2019-02-04 | End: 2019-02-04

## 2019-02-04 RX ADMIN — SODIUM CHLORIDE, SODIUM LACTATE, POTASSIUM CHLORIDE, CALCIUM CHLORIDE: 600; 310; 30; 20 INJECTION, SOLUTION INTRAVENOUS at 01:02

## 2019-02-04 NOTE — INTERVAL H&P NOTE
The patient has been examined and the H&P has been reviewed:    I concur with the findings and no changes have occurred since H&P was written. This patient has been cleared for surgery in an ambulatory surgical facility    ASA 3,  Mallampatti Score 3  No history of anesthetic complications  Plan for RN IV sedation      Anesthesia/Surgery risks, benefits and alternative options discussed and understood by patient/family.          Active Hospital Problems    Diagnosis  POA    Lumbar radiculitis [M54.16]  Yes      Resolved Hospital Problems   No resolved problems to display.

## 2019-02-04 NOTE — PLAN OF CARE
Stable, states ready to go home, jessica po fluids, denies pain, able to hold legs off bed and stand, eye glasses placed back on pt, ambulated to car with RN to

## 2019-02-04 NOTE — OP NOTE
PROCEDURE DATE: 2/4/2019    PROCEDURE: Left L4-5 transforaminal epidural steroid injection under fluoroscopy    DIAGNOSIS: Lumbar disc displacement without myelopathy  Post op diagnosis: Same    PHYSICIAN: Delmar Rosas MD    MEDICATIONS INJECTED:  Dexamethasone 5mg (0.5ml) and 1.5ml 0.25% bupivicaine at each nerve root.     LOCAL ANESTHETIC INJECTED:  Lidocaine 1%. 2 ml per site.    SEDATION MEDICATIONS: RN IV sedation    ESTIMATED BLOOD LOSS:  None    COMPLICATIONS:  None    TECHNIQUE:   A time-out was taken to identify patient and procedure side prior to starting the procedure. The patient was placed in a prone position, prepped and draped in the usual sterile fashion using ChloraPrep and sterile towels.  The area to be injected was determined under fluoroscopic guidance in AP and oblique view.  Local anesthetic was given by raising a wheal and going down to the hub of a 25-gauge 1.5 inch needle.  In oblique view, a 3.5 inch 22-gauge bent-tip spinal needle was introduced towards 6 oclock position of the pedicle of each above named nerve root level.  The needle was walked medially then hinged into the neural foramen and position was confirmed in AP and lateral views.  1ml contrast dye was injected to confirm appropriate placement and that there was no vascular uptake.  After negative aspiration for blood or CSF, the medication was then injected. This was performed at the left L4-5 level(s). The patient tolerated the procedure well.    The patient was monitored after the procedure.  Patient was given post procedure and discharge instructions to follow at home. The patient was discharged in a stable condition.

## 2019-02-05 VITALS
DIASTOLIC BLOOD PRESSURE: 83 MMHG | SYSTOLIC BLOOD PRESSURE: 165 MMHG | TEMPERATURE: 98 F | HEART RATE: 72 BPM | BODY MASS INDEX: 34.6 KG/M2 | HEIGHT: 62 IN | OXYGEN SATURATION: 96 % | RESPIRATION RATE: 20 BRPM | WEIGHT: 188 LBS

## 2019-02-05 RX ORDER — HYDROCODONE BITARTRATE AND ACETAMINOPHEN 7.5; 325 MG/1; MG/1
1 TABLET ORAL EVERY 6 HOURS PRN
Qty: 25 TABLET | Refills: 0 | Status: SHIPPED | OUTPATIENT
Start: 2019-02-05 | End: 2019-03-07

## 2019-02-26 ENCOUNTER — OFFICE VISIT (OUTPATIENT)
Dept: ENDOCRINOLOGY | Facility: CLINIC | Age: 69
End: 2019-02-26
Payer: MEDICARE

## 2019-02-26 ENCOUNTER — LAB VISIT (OUTPATIENT)
Dept: LAB | Facility: HOSPITAL | Age: 69
End: 2019-02-26
Attending: PHYSICIAN ASSISTANT
Payer: MEDICARE

## 2019-02-26 VITALS
TEMPERATURE: 98 F | WEIGHT: 193.56 LBS | HEART RATE: 64 BPM | DIASTOLIC BLOOD PRESSURE: 80 MMHG | HEIGHT: 62 IN | BODY MASS INDEX: 35.62 KG/M2 | SYSTOLIC BLOOD PRESSURE: 146 MMHG | RESPIRATION RATE: 18 BRPM

## 2019-02-26 DIAGNOSIS — E78.00 HYPERCHOLESTEROLEMIA: ICD-10-CM

## 2019-02-26 DIAGNOSIS — G43.909 MIGRAINE WITHOUT STATUS MIGRAINOSUS, NOT INTRACTABLE, UNSPECIFIED MIGRAINE TYPE: ICD-10-CM

## 2019-02-26 DIAGNOSIS — E11.42 TYPE 2 DIABETES MELLITUS WITH DIABETIC POLYNEUROPATHY, WITHOUT LONG-TERM CURRENT USE OF INSULIN: ICD-10-CM

## 2019-02-26 DIAGNOSIS — E04.1 NODULAR THYROID DISEASE: ICD-10-CM

## 2019-02-26 DIAGNOSIS — I10 ESSENTIAL HYPERTENSION: ICD-10-CM

## 2019-02-26 DIAGNOSIS — E03.9 HYPOTHYROIDISM, UNSPECIFIED TYPE: Primary | ICD-10-CM

## 2019-02-26 DIAGNOSIS — E55.9 HYPOVITAMINOSIS D: ICD-10-CM

## 2019-02-26 DIAGNOSIS — M85.80 OSTEOPENIA, UNSPECIFIED LOCATION: ICD-10-CM

## 2019-02-26 DIAGNOSIS — R12 HEARTBURN: ICD-10-CM

## 2019-02-26 DIAGNOSIS — E66.9 OBESITY (BMI 30-39.9): ICD-10-CM

## 2019-02-26 DIAGNOSIS — E06.3 HASHIMOTO'S THYROIDITIS: ICD-10-CM

## 2019-02-26 DIAGNOSIS — G47.33 OSA ON CPAP: ICD-10-CM

## 2019-02-26 LAB
25(OH)D3+25(OH)D2 SERPL-MCNC: 29 NG/ML
ALBUMIN SERPL BCP-MCNC: 3.9 G/DL
ANION GAP SERPL CALC-SCNC: 9 MMOL/L
BUN SERPL-MCNC: 18 MG/DL
CA-I BLDV-SCNC: 1.23 MMOL/L
CALCIUM SERPL-MCNC: 9.6 MG/DL
CHLORIDE SERPL-SCNC: 107 MMOL/L
CO2 SERPL-SCNC: 29 MMOL/L
CREAT SERPL-MCNC: 0.9 MG/DL
EST. GFR  (AFRICAN AMERICAN): >60 ML/MIN/1.73 M^2
EST. GFR  (NON AFRICAN AMERICAN): >60 ML/MIN/1.73 M^2
ESTIMATED AVG GLUCOSE: 126 MG/DL
GLUCOSE SERPL-MCNC: 97 MG/DL
HBA1C MFR BLD HPLC: 6 %
PHOSPHATE SERPL-MCNC: 3.3 MG/DL
POTASSIUM SERPL-SCNC: 3.5 MMOL/L
PTH-INTACT SERPL-MCNC: 82 PG/ML
SODIUM SERPL-SCNC: 145 MMOL/L
T3 SERPL-MCNC: 99 NG/DL
T4 FREE SERPL-MCNC: 1.13 NG/DL
TSH SERPL DL<=0.005 MIU/L-ACNC: 1.15 UIU/ML

## 2019-02-26 PROCEDURE — 99999 PR PBB SHADOW E&M-EST. PATIENT-LVL IV: CPT | Mod: PBBFAC,,, | Performed by: PHYSICIAN ASSISTANT

## 2019-02-26 PROCEDURE — 83036 HEMOGLOBIN GLYCOSYLATED A1C: CPT | Mod: 59

## 2019-02-26 PROCEDURE — 84480 ASSAY TRIIODOTHYRONINE (T3): CPT

## 2019-02-26 PROCEDURE — 84439 ASSAY OF FREE THYROXINE: CPT

## 2019-02-26 PROCEDURE — 86376 MICROSOMAL ANTIBODY EACH: CPT

## 2019-02-26 PROCEDURE — 83970 ASSAY OF PARATHORMONE: CPT

## 2019-02-26 PROCEDURE — 3079F DIAST BP 80-89 MM HG: CPT | Mod: CPTII,S$GLB,, | Performed by: PHYSICIAN ASSISTANT

## 2019-02-26 PROCEDURE — 82330 ASSAY OF CALCIUM: CPT

## 2019-02-26 PROCEDURE — 99999 PR PBB SHADOW E&M-EST. PATIENT-LVL IV: ICD-10-PCS | Mod: PBBFAC,,, | Performed by: PHYSICIAN ASSISTANT

## 2019-02-26 PROCEDURE — 99214 PR OFFICE/OUTPT VISIT, EST, LEVL IV, 30-39 MIN: ICD-10-PCS | Mod: S$GLB,,, | Performed by: PHYSICIAN ASSISTANT

## 2019-02-26 PROCEDURE — 1101F PR PT FALLS ASSESS DOC 0-1 FALLS W/OUT INJ PAST YR: ICD-10-PCS | Mod: CPTII,S$GLB,, | Performed by: PHYSICIAN ASSISTANT

## 2019-02-26 PROCEDURE — 82306 VITAMIN D 25 HYDROXY: CPT

## 2019-02-26 PROCEDURE — 84443 ASSAY THYROID STIM HORMONE: CPT

## 2019-02-26 PROCEDURE — 82985 ASSAY OF GLYCATED PROTEIN: CPT

## 2019-02-26 PROCEDURE — 80069 RENAL FUNCTION PANEL: CPT

## 2019-02-26 PROCEDURE — 3077F SYST BP >= 140 MM HG: CPT | Mod: CPTII,S$GLB,, | Performed by: PHYSICIAN ASSISTANT

## 2019-02-26 PROCEDURE — 99214 OFFICE O/P EST MOD 30 MIN: CPT | Mod: S$GLB,,, | Performed by: PHYSICIAN ASSISTANT

## 2019-02-26 PROCEDURE — 3044F HG A1C LEVEL LT 7.0%: CPT | Mod: CPTII,S$GLB,, | Performed by: PHYSICIAN ASSISTANT

## 2019-02-26 PROCEDURE — 84378 SUGARS SINGLE QUANT: CPT

## 2019-02-26 PROCEDURE — 3077F PR MOST RECENT SYSTOLIC BLOOD PRESSURE >= 140 MM HG: ICD-10-PCS | Mod: CPTII,S$GLB,, | Performed by: PHYSICIAN ASSISTANT

## 2019-02-26 PROCEDURE — 84432 ASSAY OF THYROGLOBULIN: CPT

## 2019-02-26 PROCEDURE — 3079F PR MOST RECENT DIASTOLIC BLOOD PRESSURE 80-89 MM HG: ICD-10-PCS | Mod: CPTII,S$GLB,, | Performed by: PHYSICIAN ASSISTANT

## 2019-02-26 PROCEDURE — 1101F PT FALLS ASSESS-DOCD LE1/YR: CPT | Mod: CPTII,S$GLB,, | Performed by: PHYSICIAN ASSISTANT

## 2019-02-26 PROCEDURE — 3044F PR MOST RECENT HEMOGLOBIN A1C LEVEL <7.0%: ICD-10-PCS | Mod: CPTII,S$GLB,, | Performed by: PHYSICIAN ASSISTANT

## 2019-02-26 RX ORDER — DULOXETIN HYDROCHLORIDE 60 MG/1
60 CAPSULE, DELAYED RELEASE ORAL DAILY
Qty: 90 CAPSULE | Refills: 3 | Status: SHIPPED | OUTPATIENT
Start: 2019-02-26 | End: 2019-05-27

## 2019-02-26 RX ORDER — GABAPENTIN 100 MG/1
100 CAPSULE ORAL DAILY
Qty: 90 CAPSULE | Refills: 3 | Status: SHIPPED | OUTPATIENT
Start: 2019-02-26 | End: 2019-06-10 | Stop reason: SDUPTHER

## 2019-02-26 RX ORDER — GABAPENTIN 400 MG/1
400 CAPSULE ORAL NIGHTLY
Qty: 90 CAPSULE | Refills: 1 | Status: CANCELLED | OUTPATIENT
Start: 2019-02-26 | End: 2020-02-26

## 2019-02-26 RX ORDER — CALCIUM CITRATE/VITAMIN D3 200MG-6.25
TABLET ORAL
Qty: 100 STRIP | Refills: 3 | Status: SHIPPED | OUTPATIENT
Start: 2019-02-26

## 2019-02-26 RX ORDER — GABAPENTIN 400 MG/1
400 CAPSULE ORAL NIGHTLY
Qty: 90 CAPSULE | Refills: 3 | Status: SHIPPED | OUTPATIENT
Start: 2019-02-26 | End: 2019-06-10 | Stop reason: SDUPTHER

## 2019-02-26 RX ORDER — GLUCOSAM/CHON-MSM1/C/MANG/BOSW 500-416.6
TABLET ORAL
Qty: 100 EACH | Refills: 3 | Status: SHIPPED | OUTPATIENT
Start: 2019-02-26

## 2019-02-26 RX ORDER — GABAPENTIN 100 MG/1
100 CAPSULE ORAL DAILY
Qty: 90 CAPSULE | Refills: 0 | Status: CANCELLED | OUTPATIENT
Start: 2019-02-26

## 2019-02-26 RX ORDER — FUROSEMIDE 20 MG/1
20 TABLET ORAL DAILY
Qty: 90 TABLET | Refills: 1 | Status: SHIPPED | OUTPATIENT
Start: 2019-02-26 | End: 2019-04-23 | Stop reason: SDUPTHER

## 2019-02-26 RX ORDER — TOPIRAMATE 50 MG/1
50 TABLET, FILM COATED ORAL DAILY
Qty: 90 TABLET | Refills: 3 | Status: CANCELLED | OUTPATIENT
Start: 2019-02-26

## 2019-02-26 RX ORDER — TOPIRAMATE 100 MG/1
100 TABLET, FILM COATED ORAL 2 TIMES DAILY
Qty: 60 TABLET | Refills: 11 | Status: SHIPPED | OUTPATIENT
Start: 2019-02-26 | End: 2019-05-03 | Stop reason: SDUPTHER

## 2019-02-26 RX ORDER — ATORVASTATIN CALCIUM 40 MG/1
40 TABLET, FILM COATED ORAL DAILY
Qty: 90 TABLET | Refills: 3 | Status: SHIPPED | OUTPATIENT
Start: 2019-02-26

## 2019-02-26 RX ORDER — ESOMEPRAZOLE MAGNESIUM 40 MG/1
40 CAPSULE, DELAYED RELEASE ORAL DAILY
Qty: 90 CAPSULE | Refills: 2 | Status: SHIPPED | OUTPATIENT
Start: 2019-02-26

## 2019-02-26 RX ORDER — LISINOPRIL 10 MG/1
TABLET ORAL
Qty: 90 TABLET | Refills: 2 | Status: SHIPPED | OUTPATIENT
Start: 2019-02-26 | End: 2019-02-26

## 2019-02-26 RX ORDER — ERGOCALCIFEROL 1.25 MG/1
50000 CAPSULE ORAL
Status: CANCELLED | OUTPATIENT
Start: 2019-02-26

## 2019-02-26 RX ORDER — LEVOTHYROXINE SODIUM 75 UG/1
TABLET ORAL
Qty: 90 TABLET | Refills: 3 | Status: SHIPPED | OUTPATIENT
Start: 2019-02-26 | End: 2019-04-23 | Stop reason: SDUPTHER

## 2019-02-26 RX ORDER — LISINOPRIL 20 MG/1
20 TABLET ORAL DAILY
Qty: 90 TABLET | Refills: 3 | Status: SHIPPED | OUTPATIENT
Start: 2019-02-26 | End: 2019-06-10 | Stop reason: SDUPTHER

## 2019-02-26 RX ORDER — CELECOXIB 100 MG/1
100 CAPSULE ORAL 2 TIMES DAILY
Qty: 60 CAPSULE | Refills: 1 | Status: CANCELLED | OUTPATIENT
Start: 2019-02-26

## 2019-02-26 NOTE — PROGRESS NOTES
"CC: DM/Hypothyroidism    HPI: Franca Coffey was diagnosed with Type 2 DM ~3-4 years ago. No hospitalizations for DM.    She is not taking any medication for DM and is not checking her BG. She refuses to take metformin. Her parents, brother have T2DM. She does not know if her family members have thyroid disease.     She had a TIA in 12/18.     Hypoglycemia: Yes, when she did not eat for a long time.   Type of Glucose Meter: True metrix  Physical Activity: no    Last Eye Exam: [facility] 8/18  Last Podiatry Exam: n/a    Last DM Education Attended: She went with her  ~1 year ago to see [employer].     Hypothyroidism-taking 75 mcg qd with all her pills  No palpitations or tremors.   Thyroid USS from 11/18 is sonographically stable compared to the prior one from 06/16 and  showed some extra thyroidal and intrathyroidal nodular lesion which are however sub cm and dont warrant biopsy at the moment. Reports SOB. No dysphagia or voice changes.     Migraines-Taking topiramate 50 mg qd and states her migraines have decreased, however she is still consistently having them.  Neuropathy-taking cymbalta 60 mg daily  DEXA 2/18-osteopenia. No recent falls, fractures. She has had one steriod injection since January.  REYES-uses CPAP    REVIEW OF SYSTEMS  General: no weakness or fatigue, wt gain.   Eyes: no intermittent blurry vision or visual disturbances.   Cardiac: no chest pain or palpitations.   Respiratory: + cough, no dyspnea.   GI: no abdominal pain or nausea.   Skin: no rashes or itching.   Neuro: + numbness or tingling in fingers.    Endocrine: +polyuria, polyphagia and polydipsia.   Remainder ROS negative    Vital Signs  BP (!) 146/80 (BP Location: Left arm, Patient Position: Sitting, BP Method: Large (Automatic))   Pulse 64   Temp 98.3 °F (36.8 °C) (Oral)   Resp 18   Ht 5' 2" (1.575 m)   Wt 87.8 kg (193 lb 9 oz)   BMI 35.40 kg/m²     Personally reviewed labs below:   Hemoglobin A1C   Date Value Ref " Range Status   11/26/2018 5.8 (H) 4.0 - 5.6 % Final     Comment:     ADA Screening Guidelines:  5.7-6.4%  Consistent with prediabetes  >or=6.5%  Consistent with diabetes  High levels of fetal hemoglobin interfere with the HbA1C  assay. Heterozygous hemoglobin variants (HbS, HgC, etc)do  not significantly interfere with this assay.   However, presence of multiple variants may affect accuracy.     02/27/2018 5.7 (H) 4.0 - 5.6 % Final     Comment:     According to ADA guidelines, hemoglobin A1c <7.0% represents  optimal control in non-pregnant diabetic patients. Different  metrics may apply to specific patient populations.   Standards of Medical Care in Diabetes-2016.  For the purpose of screening for the presence of diabetes:  <5.7%     Consistent with the absence of diabetes  5.7-6.4%  Consistent with increasing risk for diabetes   (prediabetes)  >or=6.5%  Consistent with diabetes  Currently, no consensus exists for use of hemoglobin A1c  for diagnosis of diabetes for children.  This Hemoglobin A1c assay has significant interference with fetal   hemoglobin   (HbF). The results are invalid for patients with abnormal amounts of   HbF,   including those with known Hereditary Persistence   of Fetal Hemoglobin. Heterozygous hemoglobin variants (HbAS, HbAC,   HbAD, HbAE, HbA2) do not significantly interfere with this assay;   however, presence of multiple variants in a sample may impact the %   interference.     03/07/2017 5.8 4.5 - 6.2 % Final     Comment:     According to ADA guidelines, hemoglobin A1C <7.0% represents  optimal control in non-pregnant diabetic patients.  Different  metrics may apply to specific populations.   Standards of Medical Care in Diabetes - 2016.  For the purpose of screening for the presence of diabetes:  <5.7%     Consistent with the absence of diabetes  5.7-6.4%  Consistent with increasing risk for diabetes   (prediabetes)  >or=6.5%  Consistent with diabetes  Currently no consensus exists for  use of hemoglobin A1C  for diagnosis of diabetes for children.         Chemistry        Component Value Date/Time     09/10/2018 1630    K 4.1 09/10/2018 1630     09/10/2018 1630    CO2 28 09/10/2018 1630    BUN 16 09/10/2018 1630    CREATININE 0.9 09/10/2018 1630    GLU 94 09/10/2018 1630        Component Value Date/Time    CALCIUM 9.9 09/10/2018 1630    ALKPHOS 88 02/27/2018 1051    AST 16 02/27/2018 1051    ALT 17 02/27/2018 1051    BILITOT 0.3 02/27/2018 1051    ESTGFRAFRICA >60 09/10/2018 1630    EGFRNONAA >60 09/10/2018 1630          Lab Results   Component Value Date    CHOL 124 11/26/2018    CHOL 126 12/08/2017    CHOL 179 03/07/2017     Lab Results   Component Value Date    HDL 59 11/26/2018    HDL 53 12/08/2017    HDL 51 03/07/2017     Lab Results   Component Value Date    LDLCALC 44.4 (L) 11/26/2018    LDLCALC 53.6 (L) 12/08/2017    LDLCALC 106.8 03/07/2017     Lab Results   Component Value Date    TRIG 103 11/26/2018    TRIG 97 12/08/2017    TRIG 106 03/07/2017     Lab Results   Component Value Date    CHOLHDL 47.6 11/26/2018    CHOLHDL 42.1 12/08/2017    CHOLHDL 28.5 03/07/2017       Lab Results   Component Value Date    TSH 2.067 11/26/2018       Lab Results   Component Value Date    MICALBCREAT Unable to calculate 02/27/2018       Vit D, 25-Hydroxy   Date Value Ref Range Status   11/26/2018 35 30 - 96 ng/mL Final     Comment:     Vitamin D deficiency.........<10 ng/mL                              Vitamin D insufficiency......10-29 ng/mL       Vitamin D sufficiency........> or equal to 30 ng/mL  Vitamin D toxicity............>100 ng/mL       PHYSICAL EXAMINATION  Constitutional: elderly female, appears well, no distress  Neck: Supple, trachea midline.   Respiratory: even and unlabored, CTA without wheezes.  Cardiovascular: RRR; no carotid bruits or murmurs.   Lymph: DP pulses  2+ bilaterally; no edema.   Skin: warm and dry; no injection site reactions, no acanthosis nigracans  observed.  Neuro: patient alert and cooperative; CN 2-12 grossly intact. Limping to right side.  Foot Exam: no sores or macerations noted.     Protective Sensation (w/ 10 gram monofilament):  Right: Intact  Left: Intact    Visual Inspection:  Normal -  Bilateral, Nails Intact - without Evidence of Foot Deformity- Bilateral and Dry Skin -  Bilateral    Pedal Pulses:   Right: Present  Left: Present    Posterior tibialis:   Right:Present  Left: Present     Vibratory Sensation  Right:Positive  Left:Positive     Assessment/Plan    1. Hypothyroidism, unspecified type     2. Type 2 diabetes mellitus with diabetic polyneuropathy, without long-term current use of insulin  DULoxetine (CYMBALTA) 60 MG capsule    gabapentin (NEURONTIN) 400 MG capsule    gabapentin (NEURONTIN) 100 MG capsule    Hemoglobin A1c    GlycoMark (TM)    Fructosamine    Renal function panel    TSH    T4, free    T3    Thyroglobulin    Thyroid peroxidase antibody   3. Essential hypertension  furosemide (LASIX) 20 MG tablet    lisinopril (PRINIVIL,ZESTRIL) 20 MG tablet   4. Hypercholesterolemia, baseline   atorvastatin (LIPITOR) 40 MG tablet   5. Heartburn  esomeprazole (NEXIUM) 40 MG capsule   6. Hashimoto's thyroiditis     7. Nodular thyroid disease  T3    Thyroglobulin   8. Migraine without status migrainosus, not intractable, unspecified migraine type  topiramate (TOPAMAX) 100 MG tablet   9. Osteopenia, unspecified location  PTH, intact    Calcium, ionized   10. Obesity (BMI 30-39.9), today 31.2     11. REYES on CPAP, use 100%     12. Hypovitaminosis D  Vitamin D      [id number] today. BG checks 1x daily. Hypoglycemia treatment and diabetic foot care discussed.  HTN-elevated-increase lisinopril to 20 mg daily  GBQ-yqxdyq-yckzwgqg statin and ASA  Vnjukhscbbndun-xnmuwi-WYHz today. COntinue LT4 dose.  Heartburn-stable-continue nexium  Nodular thyroid disease/Hashimoto's Disease-repeat thyroid u/s 11/19  Migraines-worsening-increase topiramate to 100  mg daily  Osteopenia-rpeat DEXA 2/20. Pt states she will try yoga.  Obesity-Body mass index is 35.4 kg/m². Increase exercise.  IEI-ihfpwd-kzgkbjgu CPAP  Hypovitaminosis D-check vd. Continue vd intake    FOLLOW UP  3 mths

## 2019-02-27 ENCOUNTER — TELEPHONE (OUTPATIENT)
Dept: ENDOCRINOLOGY | Facility: CLINIC | Age: 69
End: 2019-02-27

## 2019-02-27 LAB — THYROPEROXIDASE IGG SERPL-ACNC: 19.9 IU/ML

## 2019-02-28 ENCOUNTER — PATIENT MESSAGE (OUTPATIENT)
Dept: ENDOCRINOLOGY | Facility: CLINIC | Age: 69
End: 2019-02-28

## 2019-02-28 LAB
FRUCTOSAMINE SERPL-SCNC: 260 UMOL /L
THRYOGLOBULIN INTERPRETATION: ABNORMAL
THYROGLOB AB SERPL-ACNC: 27 IU/ML
THYROGLOB SERPL-MCNC: 0.1 NG/ML

## 2019-03-02 LAB — GLYCOMARK (TM): 28.5 UG/ML

## 2019-03-14 ENCOUNTER — OFFICE VISIT (OUTPATIENT)
Dept: PAIN MEDICINE | Facility: CLINIC | Age: 69
End: 2019-03-14
Payer: MEDICARE

## 2019-03-14 VITALS
DIASTOLIC BLOOD PRESSURE: 78 MMHG | WEIGHT: 193 LBS | BODY MASS INDEX: 35.51 KG/M2 | SYSTOLIC BLOOD PRESSURE: 156 MMHG | HEART RATE: 59 BPM | HEIGHT: 62 IN

## 2019-03-14 DIAGNOSIS — M51.36 DDD (DEGENERATIVE DISC DISEASE), LUMBAR: Primary | ICD-10-CM

## 2019-03-14 DIAGNOSIS — M54.16 LUMBAR RADICULITIS: ICD-10-CM

## 2019-03-14 DIAGNOSIS — M47.896 OTHER SPONDYLOSIS, LUMBAR REGION: ICD-10-CM

## 2019-03-14 DIAGNOSIS — M79.18 MYOFASCIAL PAIN: ICD-10-CM

## 2019-03-14 PROCEDURE — 99999 PR PBB SHADOW E&M-EST. PATIENT-LVL IV: CPT | Mod: PBBFAC,,, | Performed by: ANESTHESIOLOGY

## 2019-03-14 PROCEDURE — 3078F DIAST BP <80 MM HG: CPT | Mod: CPTII,S$GLB,, | Performed by: ANESTHESIOLOGY

## 2019-03-14 PROCEDURE — 1101F PR PT FALLS ASSESS DOC 0-1 FALLS W/OUT INJ PAST YR: ICD-10-PCS | Mod: CPTII,S$GLB,, | Performed by: ANESTHESIOLOGY

## 2019-03-14 PROCEDURE — 3077F SYST BP >= 140 MM HG: CPT | Mod: CPTII,S$GLB,, | Performed by: ANESTHESIOLOGY

## 2019-03-14 PROCEDURE — 99999 PR PBB SHADOW E&M-EST. PATIENT-LVL IV: ICD-10-PCS | Mod: PBBFAC,,, | Performed by: ANESTHESIOLOGY

## 2019-03-14 PROCEDURE — 1101F PT FALLS ASSESS-DOCD LE1/YR: CPT | Mod: CPTII,S$GLB,, | Performed by: ANESTHESIOLOGY

## 2019-03-14 PROCEDURE — 99214 PR OFFICE/OUTPT VISIT, EST, LEVL IV, 30-39 MIN: ICD-10-PCS | Mod: S$GLB,,, | Performed by: ANESTHESIOLOGY

## 2019-03-14 PROCEDURE — 3077F PR MOST RECENT SYSTOLIC BLOOD PRESSURE >= 140 MM HG: ICD-10-PCS | Mod: CPTII,S$GLB,, | Performed by: ANESTHESIOLOGY

## 2019-03-14 PROCEDURE — 3078F PR MOST RECENT DIASTOLIC BLOOD PRESSURE < 80 MM HG: ICD-10-PCS | Mod: CPTII,S$GLB,, | Performed by: ANESTHESIOLOGY

## 2019-03-14 PROCEDURE — 99214 OFFICE O/P EST MOD 30 MIN: CPT | Mod: S$GLB,,, | Performed by: ANESTHESIOLOGY

## 2019-03-14 RX ORDER — CEFDINIR 300 MG/1
CAPSULE ORAL
Refills: 1 | COMMUNITY
Start: 2019-03-09 | End: 2019-04-23 | Stop reason: ALTCHOICE

## 2019-03-14 RX ORDER — CETIRIZINE HYDROCHLORIDE 10 MG/1
TABLET ORAL
Refills: 5 | COMMUNITY
Start: 2019-03-09

## 2019-03-14 RX ORDER — DEXAMETHASONE 1.5 MG/1
TABLET ORAL
COMMUNITY
Start: 2019-03-10 | End: 2019-04-23
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
## 2019-03-18 DIAGNOSIS — M47.896 OTHER SPONDYLOSIS, LUMBAR REGION: Primary | ICD-10-CM

## 2019-03-29 LAB
LEFT EYE DM RETINOPATHY: NEGATIVE
RIGHT EYE DM RETINOPATHY: NEGATIVE

## 2019-04-01 ENCOUNTER — HOSPITAL ENCOUNTER (OUTPATIENT)
Facility: AMBULARY SURGERY CENTER | Age: 69
Discharge: HOME | End: 2019-04-01
Attending: ANESTHESIOLOGY | Admitting: ANESTHESIOLOGY
Payer: MEDICARE

## 2019-04-01 DIAGNOSIS — M47.896 OTHER SPONDYLOSIS, LUMBAR REGION: Primary | ICD-10-CM

## 2019-04-01 LAB — POCT GLUCOSE: 95 MG/DL (ref 70–110)

## 2019-04-01 PROCEDURE — 64635 PR DESTROY LUMB/SAC FACET JNT: ICD-10-PCS | Mod: 50,,, | Performed by: ANESTHESIOLOGY

## 2019-04-01 PROCEDURE — 64635 DESTROY LUMB/SAC FACET JNT: CPT | Mod: RT | Performed by: ANESTHESIOLOGY

## 2019-04-01 PROCEDURE — 64636 DESTROY L/S FACET JNT ADDL: CPT | Mod: 50,,, | Performed by: ANESTHESIOLOGY

## 2019-04-01 PROCEDURE — 64635 DESTROY LUMB/SAC FACET JNT: CPT | Mod: 50,,, | Performed by: ANESTHESIOLOGY

## 2019-04-01 PROCEDURE — 99152 MOD SED SAME PHYS/QHP 5/>YRS: CPT | Mod: ,,, | Performed by: ANESTHESIOLOGY

## 2019-04-01 PROCEDURE — 99152 PR MOD CONSCIOUS SEDATION, SAME PHYS, 5+ YRS, FIRST 15 MIN: ICD-10-PCS | Mod: ,,, | Performed by: ANESTHESIOLOGY

## 2019-04-01 PROCEDURE — 64636 PR DESTROY L/S FACET JNT ADDL: ICD-10-PCS | Mod: 50,,, | Performed by: ANESTHESIOLOGY

## 2019-04-01 PROCEDURE — 64636 DESTROY L/S FACET JNT ADDL: CPT | Mod: LT | Performed by: ANESTHESIOLOGY

## 2019-04-01 RX ORDER — SODIUM CHLORIDE, SODIUM LACTATE, POTASSIUM CHLORIDE, CALCIUM CHLORIDE 600; 310; 30; 20 MG/100ML; MG/100ML; MG/100ML; MG/100ML
INJECTION, SOLUTION INTRAVENOUS ONCE AS NEEDED
Status: COMPLETED | OUTPATIENT
Start: 2019-04-01 | End: 2019-04-01

## 2019-04-01 RX ORDER — BUPIVACAINE HYDROCHLORIDE 2.5 MG/ML
INJECTION, SOLUTION EPIDURAL; INFILTRATION; INTRACAUDAL
Status: DISCONTINUED | OUTPATIENT
Start: 2019-04-01 | End: 2019-04-01 | Stop reason: HOSPADM

## 2019-04-01 RX ORDER — LIDOCAINE HYDROCHLORIDE 10 MG/ML
INJECTION, SOLUTION EPIDURAL; INFILTRATION; INTRACAUDAL; PERINEURAL
Status: DISCONTINUED | OUTPATIENT
Start: 2019-04-01 | End: 2019-04-01 | Stop reason: HOSPADM

## 2019-04-01 RX ORDER — FENTANYL CITRATE 50 UG/ML
INJECTION, SOLUTION INTRAMUSCULAR; INTRAVENOUS
Status: DISCONTINUED | OUTPATIENT
Start: 2019-04-01 | End: 2019-04-01 | Stop reason: HOSPADM

## 2019-04-01 RX ORDER — LIDOCAINE HYDROCHLORIDE 20 MG/ML
INJECTION, SOLUTION EPIDURAL; INFILTRATION; INTRACAUDAL; PERINEURAL
Status: DISCONTINUED | OUTPATIENT
Start: 2019-04-01 | End: 2019-04-01 | Stop reason: HOSPADM

## 2019-04-01 RX ORDER — MIDAZOLAM HYDROCHLORIDE 2 MG/2ML
INJECTION, SOLUTION INTRAMUSCULAR; INTRAVENOUS
Status: DISCONTINUED | OUTPATIENT
Start: 2019-04-01 | End: 2019-04-01 | Stop reason: HOSPADM

## 2019-04-01 RX ORDER — METHYLPREDNISOLONE ACETATE 80 MG/ML
INJECTION, SUSPENSION INTRA-ARTICULAR; INTRALESIONAL; INTRAMUSCULAR; SOFT TISSUE
Status: DISCONTINUED
Start: 2019-04-01 | End: 2019-04-01 | Stop reason: HOSPADM

## 2019-04-01 RX ORDER — HYDROCODONE BITARTRATE AND ACETAMINOPHEN 7.5; 325 MG/1; MG/1
1 TABLET ORAL EVERY 6 HOURS PRN
Qty: 30 TABLET | Refills: 0 | Status: SHIPPED | OUTPATIENT
Start: 2019-04-01 | End: 2019-05-01

## 2019-04-01 RX ORDER — LIDOCAINE HYDROCHLORIDE 20 MG/ML
INJECTION, SOLUTION EPIDURAL; INFILTRATION; INTRACAUDAL; PERINEURAL
Status: DISCONTINUED
Start: 2019-04-01 | End: 2019-04-01 | Stop reason: HOSPADM

## 2019-04-01 RX ORDER — METHYLPREDNISOLONE ACETATE 80 MG/ML
INJECTION, SUSPENSION INTRA-ARTICULAR; INTRALESIONAL; INTRAMUSCULAR; SOFT TISSUE
Status: DISCONTINUED | OUTPATIENT
Start: 2019-04-01 | End: 2019-04-01 | Stop reason: HOSPADM

## 2019-04-01 RX ADMIN — SODIUM CHLORIDE, SODIUM LACTATE, POTASSIUM CHLORIDE, CALCIUM CHLORIDE: 600; 310; 30; 20 INJECTION, SOLUTION INTRAVENOUS at 02:04

## 2019-04-01 NOTE — OP NOTE
PROCEDURE DATE: 4/1/2019    PROCEDURE:  Radiofrequency ablation of the L3,45, medial branch nerves on the bilateral-side utilizing fluoroscopy    DIAGNOSIS:  Other lumbar spondylosis  Post op Diagnosis: Same    PHYSICIAN: Delmar Rosas MD    MEDICATIONS INJECTED:  From a mixture of 6ml of 0.25% bupivicaine and 80mg of methylprednisone,  1ml of this solution was injected at each level.    LOCAL ANESTHETIC USED: Lidocaine 1%, 2 ml given at each site.    SEDATION MEDICATIONS: RN IV sedation    ESTIMATED BLOOD LOSS:  None    COMPLICATIONS:  None    TECHNIQUE:  A time out was taken to identify patient and procedure side prior to starting the procedure. Laying in a prone position, the patient was prepped and draped in the usual sterile fashion using ChloraPrep and sterile towels.  The levels were determined under fluoroscopic guidance and then marked.  Local anesthetic was given by raising a wheal at the skin over each site and then infiltrated approximately 2cm deeper.  A 20-gauge  100 mm RF needle was introduced to the anatomic location of the bilateral L3,4,5 medial branch nerves. Motor stimulation up to 2 Volts at each level confirmed no motor nerve involvement.  Impedance was less than 800 ohms at each level.  1ml of 2% lidocaine was instilled prior to lesioning.  Ablation was performed per level utilizing radiofrequency generator 80°C for 60 seconds.  Needles were then rotated 90° and a second lesion was performed.  The above noted medication was then injected slowly. The patient tolerated the procedure well.     The patient was monitored after the procedure.  Patient was given post procedure and discharge instructions to follow at home.  The patient was discharged in a stable condition

## 2019-04-01 NOTE — DISCHARGE SUMMARY
[facility]  Discharge Note  Short Stay    Admit Date: 4/1/2019    Discharge Date and Time: 4/1/2019    Attending Physician: Delmar Rosas MD     Discharge Provider: Delmar Rosas    Diagnoses:  Active Hospital Problems    Diagnosis  POA    *Other spondylosis, lumbar region [M47.896]  Yes      Resolved Hospital Problems   No resolved problems to display.       Hospital Course: Lumbar MB RFA  Discharged Condition: Good    Final Diagnoses:   Active Hospital Problems    Diagnosis  POA    *Other spondylosis, lumbar region [M47.896]  Yes      Resolved Hospital Problems   No resolved problems to display.       Disposition: Home or Self Care    Follow up/Patient Instructions:    Medications:  Reconciled Home Medications:      Medication List      CONTINUE taking these medications    aspirin 81 MG Chew  Take 1 tablet (81 mg total) by mouth once daily.     atorvastatin 40 MG tablet  Commonly known as:  LIPITOR  Take 1 tablet (40 mg total) by mouth once daily.     b complex vitamins tablet  Take 1 tablet by mouth once daily.     buPROPion 300 MG 24 hr tablet  Commonly known as:  WELLBUTRIN XL  TAKE 1 TABLET EVERY DAY     cefdinir 300 MG capsule  Commonly known as:  OMNICEF  TK 2 CS PO QD FOR 10 DAYS     celecoxib 100 MG capsule  Commonly known as:  CeleBREX  Take 1 capsule (100 mg total) by mouth 2 (two) times daily.     cetirizine 10 MG tablet  Commonly known as:  ZYRTEC  TK 1 T PO QHS     chlordiazepoxide-clidinium 5-2.5 mg 5-2.5 mg Cap  Commonly known as:  LIBRAX  Take by mouth. 2 qam, 1 q noon, 1 qhs     DULoxetine 60 MG capsule  Commonly known as:  CYMBALTA  Take 1 capsule (60 mg total) by mouth once daily.     esomeprazole 40 MG capsule  Commonly known as:  NEXIUM  Take 1 capsule (40 mg total) by mouth once daily.     furosemide 20 MG tablet  Commonly known as:  LASIX  Take 1 tablet (20 mg total) by mouth once daily.     * gabapentin 400 MG capsule  Commonly known as:  NEURONTIN  Take 1 capsule (400 mg total) by  mouth every evening.     * gabapentin 100 MG capsule  Commonly known as:  NEURONTIN  Take 1 capsule (100 mg total) by mouth once daily.     levothyroxine 75 MCG tablet  Commonly known as:  SYNTHROID  TAKE 1 TABLET DAILY BEFORE BREAKFAST     lisinopril 20 MG tablet  Commonly known as:  PRINIVIL,ZESTRIL  Take 1 tablet (20 mg total) by mouth once daily.     metoprolol succinate 25 MG 24 hr tablet  Commonly known as:  TOPROL-XL  Take 1 tablet (25 mg total) by mouth once daily.     TAPERDEX 1.5 mg (21 tabs) Dspk  Generic drug:  dexamethasone     topiramate 100 MG tablet  Commonly known as:  TOPAMAX  Take 1 tablet (100 mg total) by mouth 2 (two) times daily.     TRUE METRIX AIR GLUCOSE METER kit  Generic drug:  blood-glucose meter     TRUE METRIX GLUCOSE TEST STRIP Strp  Generic drug:  blood sugar diagnostic  Check BG 1x daily.     TRUE METRIX LEVEL 1 Soln  Generic drug:  blood glucose control, low     TRUEPLUS LANCETS 28 gauge Misc  Generic drug:  lancets  Use to test blood glucose 1x daily.         * This list has 2 medication(s) that are the same as other medications prescribed for you. Read the directions carefully, and ask your doctor or other care provider to review them with you.              Discharge Procedure Orders   Call MD for:  temperature >100.4     Call MD for:  persistent nausea and vomiting or diarrhea     Call MD for:  severe uncontrolled pain     Call MD for:  redness, tenderness, or signs of infection (pain, swelling, redness, odor or green/yellow discharge around incision site)     Call MD for:  difficulty breathing or increased cough     Call MD for:  severe persistent headache        Follow up with MD in 2-3 weeks    Discharge Procedure Orders (must include Diet, Follow-up, Activity):   Discharge Procedure Orders (must include Diet, Follow-up, Activity)   Call MD for:  temperature >100.4     Call MD for:  persistent nausea and vomiting or diarrhea     Call MD for:  severe uncontrolled pain     Call  MD for:  redness, tenderness, or signs of infection (pain, swelling, redness, odor or green/yellow discharge around incision site)     Call MD for:  difficulty breathing or increased cough     Call MD for:  severe persistent headache

## 2019-04-01 NOTE — DISCHARGE INSTRUCTIONS
RADIOFREQUENCY/Pain injection instructions:     This procedure may take a couple weeks to relieve pain  You may get some pain relief from the local anesthetic initally.    No driving for 24 hrs.   Activity as tolerated- gradually increase activities.  Dont lift over 10 lbs for 24 hrs   No heat at injection sites x 2 days. No heating pads, hot tubs, saunas, or swimming in any body of water or pool for 2 days.  Use ice pack for mild swelling and for comfort , apply for 20 minutes, remove for 20 minute intervals. No direct contact of ice itself  to skin.  May shower today. Do not allow shower water to hit injection sites for 2 days. No tub baths for two days.      Resume Aspirin, Plavix, or Coumadin the day after the procedure unless otherwise instructed.   If diabetic,monitor your glucose carefully as steroids can increase your glucose level    Seek immediate medical help for:   Severe increase in your usual pain or appearance of new pain.  Prolonged (more than 8 hours) or increasing weakness or numbness in the legs or arms. Numbing medicine was injected and can affect the messages to and from the brain and legs or arms.  .    Fever above 100.4F ,Drainage,redness,active bleeding, or increased swelling at the injection site.  Headache, shortness of breath, chest pain, or breathing problems.    Recovery After Procedural Sedation (Adult)  You have been given medicine by vein to make you sleep during your surgery. This may have included both a pain medicine and sleeping medicine. Most of the effects have worn off. But you may still have some drowsiness for the next 6 to 8 hours.  Home care  Follow these guidelines when you get home:  · For the next 8 hours, you should be watched by a responsible adult. This person should make sure your condition is not getting worse.  · Don't drink any alcohol for the next 24 hours.  · Don't drive, operate dangerous machinery, or make important business or personal decisions during the  next 24 hours.  Note: Your healthcare provider may tell you not to take any medicine by mouth for pain or sleep in the next 4 hours. These medicines may react with the medicines you were given in the hospital. This could cause a much stronger response than usual.  Follow-up care  Follow up with your healthcare provider if you are not alert and back to your usual level of activity within 12 hours.  When to seek medical advice  Call your healthcare provider right away if any of these occur:  · Drowsiness gets worse  · Weakness or dizziness gets worse  · Repeated vomiting  · You can't be awakened   Date Last Reviewed: 10/18/2016  © 5183-2214 [employer] [address] [place] [address], [place] [address]. All rights reserved. This information is not intended as a substitute for professional medical care. Always follow your healthcare professional's instructions.      Before leaving, please make sure you have all your personal belongings such as glasses, purses, wallets, keys, cell phones, jewelry, jackets etc     Radiofrequency Thermocoagulation Recovery at Home  What to know about pain relief  An injection to reduce inflammation takes a few days to work, sometimes even up to a week. There may even be more pain at first.  Tips for recovery  · You may use an ice pack at your injection site for comfort.  · You may shower this evening.   · Do not use a heating pad or take tub baths or swim for 2 days.  · Take your usual medication for pain if needed.  · Gradually increase your activities.  · Dont lift anything over 10 lbs for the first 24 hours  · Dont drive the day of the procedure.  · Wait until tomorrow to resume any blood thinners (aspirin, Plavix, Coumadin) but you may resume all your other medications today.  When to call your doctor  Call right away if you notice any of the following symptoms:  · Severe pain or headache  · Fever or chills  · Redness or swelling around the injection site   · Difficulty  breathing  · Vomiting  · Increasing numbness or weakness in arms or legs  · If you are diabetic, a steroid injection can increase your blood sugar so moniter it carefully.

## 2019-04-01 NOTE — PLAN OF CARE
Patient sitting in chair and states she is  ready to go home; denies pain nausea or any weakness.  Able to stand up from chair and ambulate without dizziness.Spouse present at chairside and states he is ready to take patient home and that he is driving the patient home.

## 2019-04-02 VITALS
SYSTOLIC BLOOD PRESSURE: 162 MMHG | RESPIRATION RATE: 18 BRPM | OXYGEN SATURATION: 94 % | WEIGHT: 193 LBS | DIASTOLIC BLOOD PRESSURE: 77 MMHG | TEMPERATURE: 98 F | BODY MASS INDEX: 35.51 KG/M2 | HEART RATE: 74 BPM | HEIGHT: 62 IN

## 2019-04-17 ENCOUNTER — DOCUMENTATION ONLY (OUTPATIENT)
Dept: FAMILY MEDICINE | Facility: CLINIC | Age: 69
End: 2019-04-17

## 2019-04-17 DIAGNOSIS — I10 ESSENTIAL HYPERTENSION: ICD-10-CM

## 2019-04-17 RX ORDER — METOPROLOL SUCCINATE 25 MG/1
25 TABLET, EXTENDED RELEASE ORAL DAILY
Qty: 30 TABLET | Refills: 1 | Status: SHIPPED | OUTPATIENT
Start: 2019-04-17 | End: 2019-04-23 | Stop reason: SDUPTHER

## 2019-04-17 NOTE — TELEPHONE ENCOUNTER
----- Message from Bree Osborn sent at 4/17/2019 12:13 PM CDT -----  Contact: [facility]   Type:  RX Refill Request    Who Called:  [facility]  Refill or New Rx:  refill  RX Name and Strength:  metoprolol succinate (TOPROL-XL) 25 MG 24 hr tablet  How is the patient currently taking it? (ex. 1XDay):  1x day  Is this a 30 day or 90 day RX:  30 day  Preferred Pharmacy with phone number:    [id number] - [place], [place] - [address] AT [facility] [address] [facility] [address]  [address]  [facility] [place] [address]  Phone: [phone] Fax: [phone]  Local or Mail Order:  local  Ordering Provider:  Yariel Parker Call Back Number:  [phone] (home)    Additional Information:  Patient needs a short supply sent to her local pharmacy until mail order is delivered

## 2019-04-17 NOTE — TELEPHONE ENCOUNTER
All recorded blood pressure readings since I saw her in December are elevated.  Needs office visit for blood pressure check

## 2019-04-17 NOTE — PROGRESS NOTES
Pre-Visit Chart Review  For Appointment Scheduled on 4-18-19    Health Maintenance Due   Topic Date Due    Zoster Vaccine  01/29/2008    Eye Exam  09/30/2017

## 2019-04-18 ENCOUNTER — DOCUMENTATION ONLY (OUTPATIENT)
Dept: FAMILY MEDICINE | Facility: CLINIC | Age: 69
End: 2019-04-18

## 2019-04-18 NOTE — PROGRESS NOTES
Pre-Visit Chart Review  For Appointment Scheduled on 4-23-19    Health Maintenance Due   Topic Date Due    Zoster Vaccine  01/29/2008    Eye Exam  09/30/2017

## 2019-04-23 ENCOUNTER — INITIAL CONSULT (OUTPATIENT)
Dept: OPHTHALMOLOGY | Facility: CLINIC | Age: 69
End: 2019-04-23
Payer: MEDICARE

## 2019-04-23 ENCOUNTER — OFFICE VISIT (OUTPATIENT)
Dept: FAMILY MEDICINE | Facility: CLINIC | Age: 69
End: 2019-04-23
Attending: FAMILY MEDICINE
Payer: MEDICARE

## 2019-04-23 VITALS
OXYGEN SATURATION: 91 % | HEIGHT: 62 IN | SYSTOLIC BLOOD PRESSURE: 128 MMHG | WEIGHT: 197.06 LBS | BODY MASS INDEX: 36.26 KG/M2 | DIASTOLIC BLOOD PRESSURE: 62 MMHG | RESPIRATION RATE: 16 BRPM | HEART RATE: 75 BPM | TEMPERATURE: 98 F

## 2019-04-23 DIAGNOSIS — F32.1 CURRENT MODERATE EPISODE OF MAJOR DEPRESSIVE DISORDER WITHOUT PRIOR EPISODE: ICD-10-CM

## 2019-04-23 DIAGNOSIS — E66.01 SEVERE OBESITY (BMI 35.0-39.9) WITH COMORBIDITY: ICD-10-CM

## 2019-04-23 DIAGNOSIS — E11.69 HYPERLIPIDEMIA ASSOCIATED WITH TYPE 2 DIABETES MELLITUS: ICD-10-CM

## 2019-04-23 DIAGNOSIS — I15.2 HYPERTENSION ASSOCIATED WITH DIABETES: ICD-10-CM

## 2019-04-23 DIAGNOSIS — I10 GOOD HYPERTENSION CONTROL: Primary | ICD-10-CM

## 2019-04-23 DIAGNOSIS — H53.8 BLURRED VISION: Primary | ICD-10-CM

## 2019-04-23 DIAGNOSIS — E11.42 TYPE 2 DIABETES MELLITUS WITH DIABETIC POLYNEUROPATHY, WITHOUT LONG-TERM CURRENT USE OF INSULIN: ICD-10-CM

## 2019-04-23 DIAGNOSIS — E78.5 HYPERLIPIDEMIA ASSOCIATED WITH TYPE 2 DIABETES MELLITUS: ICD-10-CM

## 2019-04-23 DIAGNOSIS — E03.9 ACQUIRED HYPOTHYROIDISM: ICD-10-CM

## 2019-04-23 DIAGNOSIS — H54.3 VISION LOSS, BILATERAL: ICD-10-CM

## 2019-04-23 DIAGNOSIS — Z96.1 PSEUDOPHAKIA OF BOTH EYES: ICD-10-CM

## 2019-04-23 DIAGNOSIS — H52.7 REFRACTIVE ERROR: ICD-10-CM

## 2019-04-23 DIAGNOSIS — J32.0 CHRONIC MAXILLARY SINUSITIS: ICD-10-CM

## 2019-04-23 DIAGNOSIS — E11.59 HYPERTENSION ASSOCIATED WITH DIABETES: ICD-10-CM

## 2019-04-23 PROCEDURE — 3074F SYST BP LT 130 MM HG: CPT | Mod: CPTII,S$GLB,, | Performed by: FAMILY MEDICINE

## 2019-04-23 PROCEDURE — 92014 COMPRE OPH EXAM EST PT 1/>: CPT | Mod: S$GLB,,, | Performed by: OPHTHALMOLOGY

## 2019-04-23 PROCEDURE — 99214 OFFICE O/P EST MOD 30 MIN: CPT | Mod: S$GLB,,, | Performed by: FAMILY MEDICINE

## 2019-04-23 PROCEDURE — 3078F PR MOST RECENT DIASTOLIC BLOOD PRESSURE < 80 MM HG: ICD-10-PCS | Mod: CPTII,S$GLB,, | Performed by: FAMILY MEDICINE

## 2019-04-23 PROCEDURE — 3044F HG A1C LEVEL LT 7.0%: CPT | Mod: CPTII,S$GLB,, | Performed by: FAMILY MEDICINE

## 2019-04-23 PROCEDURE — 3074F PR MOST RECENT SYSTOLIC BLOOD PRESSURE < 130 MM HG: ICD-10-PCS | Mod: CPTII,S$GLB,, | Performed by: FAMILY MEDICINE

## 2019-04-23 PROCEDURE — 1101F PT FALLS ASSESS-DOCD LE1/YR: CPT | Mod: CPTII,S$GLB,, | Performed by: FAMILY MEDICINE

## 2019-04-23 PROCEDURE — 99999 PR PBB SHADOW E&M-EST. PATIENT-LVL II: CPT | Mod: PBBFAC,,, | Performed by: OPHTHALMOLOGY

## 2019-04-23 PROCEDURE — 92014 PR EYE EXAM, EST PATIENT,COMPREHESV: ICD-10-PCS | Mod: S$GLB,,, | Performed by: OPHTHALMOLOGY

## 2019-04-23 PROCEDURE — 1101F PR PT FALLS ASSESS DOC 0-1 FALLS W/OUT INJ PAST YR: ICD-10-PCS | Mod: CPTII,S$GLB,, | Performed by: FAMILY MEDICINE

## 2019-04-23 PROCEDURE — 99999 PR PBB SHADOW E&M-EST. PATIENT-LVL II: ICD-10-PCS | Mod: PBBFAC,,, | Performed by: OPHTHALMOLOGY

## 2019-04-23 PROCEDURE — 3078F DIAST BP <80 MM HG: CPT | Mod: CPTII,S$GLB,, | Performed by: FAMILY MEDICINE

## 2019-04-23 PROCEDURE — 99999 PR PBB SHADOW E&M-EST. PATIENT-LVL IV: ICD-10-PCS | Mod: PBBFAC,,, | Performed by: FAMILY MEDICINE

## 2019-04-23 PROCEDURE — 99214 PR OFFICE/OUTPT VISIT, EST, LEVL IV, 30-39 MIN: ICD-10-PCS | Mod: S$GLB,,, | Performed by: FAMILY MEDICINE

## 2019-04-23 PROCEDURE — 3044F PR MOST RECENT HEMOGLOBIN A1C LEVEL <7.0%: ICD-10-PCS | Mod: CPTII,S$GLB,, | Performed by: FAMILY MEDICINE

## 2019-04-23 PROCEDURE — 99999 PR PBB SHADOW E&M-EST. PATIENT-LVL IV: CPT | Mod: PBBFAC,,, | Performed by: FAMILY MEDICINE

## 2019-04-23 RX ORDER — LEVOTHYROXINE SODIUM 75 UG/1
TABLET ORAL
Qty: 90 TABLET | Refills: 3 | Status: SHIPPED | OUTPATIENT
Start: 2019-04-23 | End: 2020-04-27

## 2019-04-23 RX ORDER — MULTIVITAMIN
1 TABLET ORAL DAILY
COMMUNITY

## 2019-04-23 RX ORDER — BUPROPION HYDROCHLORIDE 300 MG/1
300 TABLET ORAL DAILY
Qty: 90 TABLET | Refills: 1 | Status: SHIPPED | OUTPATIENT
Start: 2019-04-23 | End: 2019-11-05 | Stop reason: SDUPTHER

## 2019-04-23 RX ORDER — METOPROLOL SUCCINATE 25 MG/1
25 TABLET, EXTENDED RELEASE ORAL DAILY
Qty: 90 TABLET | Refills: 3 | Status: SHIPPED | OUTPATIENT
Start: 2019-04-23 | End: 2020-03-27

## 2019-04-23 RX ORDER — LEVOFLOXACIN 750 MG/1
750 TABLET ORAL DAILY
Qty: 7 TABLET | Refills: 0 | Status: SHIPPED | OUTPATIENT
Start: 2019-04-23 | End: 2019-04-30

## 2019-04-23 RX ORDER — FUROSEMIDE 20 MG/1
20 TABLET ORAL DAILY
Qty: 90 TABLET | Refills: 1 | Status: SHIPPED | OUTPATIENT
Start: 2019-04-23

## 2019-04-23 NOTE — PROGRESS NOTES
Subjective:       Patient ID: Franca Coffey is a 69 y.o. female.    Chief Complaint: Hypertension    69-year-old female coming in for somewhat indeterminate reasons assistance of bleed to check her blood pressure.  She was treated for strep throat in urgent care facility with an unknown antibiotic probably doxycycline several weeks ago.  Prior to onset she had had some sinus pain and pressure and that improved but did not resolve with the treatment for the sore throat.  She continues to have postnasal drainage. She went for an eye exam at [facility] where she was told she had lost vision.  She was referred for a visual field test which apparently confirm the vision lost but they could not tell her what the problem was and simply made a follow-up appointment in six months.  She is concerned whether she is safe to drive but could not get an answer from the eye clinic.  She has a history of anxiety and depression, diabetes, hypertension, hyperlipidemia, sleep apnea, osteoarthritis and numerous other problems.    Past Medical History:  No date: Anxiety  No date: Cataract  No date: Depression  No date: Diabetes mellitus      Comment:  managing with diet  No date: Dizzy  No date: Fibromyalgia  No date: GERD (gastroesophageal reflux disease)  No date: History of bladder infections  No date: Hypertension  No date: Hypothyroid  No date: IBS (irritable bowel syndrome)  No date: Kidney stones  No date: Meniere disease  No date: Migraine  No date: Muscle spasms of head and/or neck      Comment:  and lower ext  No date: Osteoarthritis  No date: Osteopenia  No date: PONV (postoperative nausea and vomiting)      Comment:  severe-cause by morphine per pt  No date: Sleep apnea      Comment:  CPAP  No date: Vision disorder    Past Surgical History:  8/3/2018: [id number]; Left      Comment:  Performed by Gee Byers MD at [facility] OR  10/15/2013: ARTHROSCOPY, KNEE; Right      Comment:   Performed by Edgar Kumar Jr., MD at [facility] OR  9/18/2018: ARTHROSCOPY, KNEE, WITH MENISCECTOMY; Right      Comment:  Performed by Pradeep Cuellar MD at [facility] OR  No date: BLADDER SUSPENSION  6/17/2016: BLOCK-NERVE; Left      Comment:  Performed by Gee Byers MD at [facility] OR  8/1/2014: BLOCK-NERVE-MEDIAL BRANCH-LUMBAR; Bilateral      Comment:  Performed by Delmar Rosas MD at [facility] OR  No date: BREAST BIOPSY  10/15/2013: CHONDRECTOMY, KNEE, SEMILUNAR CARTILAGE; Right      Comment:  Performed by Edgar Kumar Jr., MD at [facility] OR  10/15/2013: CHONDROPLASTY-CONDYLE; Right      Comment:  Performed by Edgar Kumar Jr., MD at [facility] OR  8/27/14: COLONOSCOPY      Comment:  Dr. Thomas, 5 year recheck  8/27/2014: COLONOSCOPY; N/A      Comment:  Performed by Luis Thomas MD at [facility] ENDO  No date: dental implant      Comment:  upper RT implant  4/15/2013: EGD (ESOPHAGOGASTRODUODENOSCOPY); N/A      Comment:  Performed by Luis Thomas MD at [facility] ENDO  6/27/2016: ESOPHAGOGASTRODUODENOSCOPY (EGD); N/A      Comment:  Performed by Luis Thomas MD at [facility] ENDO  No date: EYE SURGERY      Comment:  bilateral PHACO with IOL  12/05/2008: FOOT SURGERY; Bilateral      Comment:  bunionectomy  No date: FRACTURE SURGERY  No date: HYSTERECTOMY  2/4/2019: Injection,steroid,epidural,transforaminal approach L4-5;   Left      Comment:  Performed by Delmar Rosas MD at [facility] OR  12/23/2014: INJECTION-STEROID-EPIDURAL-LUMBAR; N/A      Comment:  Performed by Delmar Rosas MD at [facility] OR  No date: JOINT REPLACEMENT; Left      Comment:  Partial knee  No date: JOINT REPLACEMENT; Left      Comment:  Total knee replacement  No date: KNEE ARTHROSCOPY; Bilateral  No date: MANDIBLE FRACTURE SURGERY  9/18/2018: MENISCECTOMY, KNEE, MEDIAL; Right      Comment:  Performed by Pradeep Cuellar MD at [facility] OR  No date: MULTIPLE TOOTH EXTRACTIONS  8/12/2016: radiofrequency ablation of the genicular branches of the   left knee (4); Left       Comment:  Performed by Gee Byers MD at [facility] OR  4/1/2019: Radiofrequency Ablation, Nerve, Spinal, Lumbar, Medial   Branch, L3,4,5; Bilateral      Comment:  Performed by Delmar Rosas MD at [facility] OR  2/16/2018: RADIOFREQUENCY THERMOCOAGULATION (RFTC)-NERVE-MEDIAN   BRANCH-LUMBAR; Bilateral      Comment:  Performed by Delmar Rosas MD at [facility] OR  8/8/2014: RADIOFREQUENCY THERMOCOAGULATION (RFTC)-NERVE-MEDIAN BRANCH-  LUMBAR; Bilateral      Comment:  Performed by Delmar Rosas MD at [facility] OR  No date: revision of mesh      Comment:  repair to bladder suspension  10/15/2013: SYNOVECTOMY, KNEE; Right      Comment:  Performed by Edgar Kumar Jr., MD at [facility] OR  No date: TONSILLECTOMY, ADENOIDECTOMY  No date: UPPER GASTROINTESTINAL ENDOSCOPY  No date: VAGINAL DELIVERY      Comment:  times 2    Current Outpatient Medications on File Prior to Visit:  aspirin 81 MG Chew, Take 1 tablet (81 mg total) by mouth once daily., Disp: , Rfl: 0  atorvastatin (LIPITOR) 40 MG tablet, Take 1 tablet (40 mg total) by mouth once daily., Disp: 90 tablet, Rfl: 3  celecoxib (CELEBREX) 100 MG capsule, Take 1 capsule (100 mg total) by mouth 2 (two) times daily., Disp: 60 capsule, Rfl: 1  cetirizine (ZYRTEC) 10 MG tablet, TK 1 T PO QHS, Disp: , Rfl: 5  chlordiazepoxide-clidinium 5-2.5 mg (LIBRAX) 5-2.5 mg Cap, Take by mouth. 2 qam, 1 q noon, 1 qhs, Disp: , Rfl: 0  DULoxetine (CYMBALTA) 60 MG capsule, Take 1 capsule (60 mg total) by mouth once daily., Disp: 90 capsule, Rfl: 3  esomeprazole (NEXIUM) 40 MG capsule, Take 1 capsule (40 mg total) by mouth once daily., Disp: 90 capsule, Rfl: 2  gabapentin (NEURONTIN) 100 MG capsule, Take 1 capsule (100 mg total) by mouth once daily., Disp: 90 capsule, Rfl: 3  gabapentin (NEURONTIN) 400 MG capsule, Take 1 capsule (400 mg total) by mouth every evening., Disp: 90 capsule, Rfl: 3  HYDROcodone-acetaminophen (NORCO) 7.5-325 mg per tablet, Take 1 tablet by mouth every 6 (six) hours as needed for Pain.,  Disp: 30 tablet, Rfl: 0  lisinopril (PRINIVIL,ZESTRIL) 20 MG tablet, Take 1 tablet (20 mg total) by mouth once daily., Disp: 90 tablet, Rfl: 3  multivitamin (ONE DAILY MULTIVITAMIN) per tablet, Take 1 tablet by mouth once daily., Disp: , Rfl:   topiramate (TOPAMAX) 100 MG tablet, Take 1 tablet (100 mg total) by mouth 2 (two) times daily., Disp: 60 tablet, Rfl: 11  TRUE METRIX AIR GLUCOSE METER kit, , Disp: , Rfl:   TRUE METRIX GLUCOSE TEST STRIP Strp, Check BG 1x daily., Disp: 100 strip, Rfl: 3  TRUE METRIX LEVEL 1 Soln, , Disp: , Rfl:   TRUEPLUS LANCETS 28 gauge Misc, Use to test blood glucose 1x daily., Disp: 100 each, Rfl: 3  (DISCONTINUED) buPROPion (WELLBUTRIN XL) 300 MG 24 hr tablet, TAKE 1 TABLET EVERY DAY, Disp: 90 tablet, Rfl: 1  (DISCONTINUED) furosemide (LASIX) 20 MG tablet, Take 1 tablet (20 mg total) by mouth once daily., Disp: 90 tablet, Rfl: 1  (DISCONTINUED) levothyroxine (SYNTHROID) 75 MCG tablet, TAKE 1 TABLET DAILY BEFORE BREAKFAST, Disp: 90 tablet, Rfl: 3  (DISCONTINUED) metoprolol succinate (TOPROL-XL) 25 MG 24 hr tablet, Take 1 tablet (25 mg total) by mouth once daily., Disp: 30 tablet, Rfl: 1  (DISCONTINUED) b complex vitamins tablet, Take 1 tablet by mouth once daily., Disp: , Rfl:   (DISCONTINUED) cefdinir (OMNICEF) 300 MG capsule, TK 2 CS PO QD FOR 10 DAYS, Disp: , Rfl: 1  (DISCONTINUED) TAPERDEX 1.5 mg (21 tabs) DsPk, , Disp: , Rfl:     No current facility-administered medications on file prior to visit.         Review of Systems   Constitutional: Negative for chills and fever.   HENT: Positive for congestion, postnasal drip, rhinorrhea, sinus pressure, sinus pain and sore throat.    Eyes: Positive for visual disturbance.   Respiratory: Negative for chest tightness and shortness of breath.    Cardiovascular: Negative for chest pain and palpitations.   Gastrointestinal: Negative for abdominal pain, constipation, diarrhea, nausea and vomiting.   Genitourinary: Negative for dysuria, frequency  and hematuria.   Neurological: Positive for dizziness and headaches. Negative for light-headedness.       Objective:      Physical Exam   Constitutional: She is oriented to person, place, and time. She appears well-developed. No distress.   Good blood pressure control  Severe obesity with a BMI of 36.1 she is up 8.8 lb from her December 27, 2018 visit   HENT:   Head: Normocephalic and atraumatic.   Right Ear: External ear normal.   Left Ear: External ear normal.   Mouth/Throat: Oropharynx is clear and moist. No oropharyngeal exudate.   Purulent postnasal drainage present, moderate erythema and swelling of nasal turbinates and tenderness in maxillary areas to percussion worse on the left   Eyes: Pupils are equal, round, and reactive to light. Conjunctivae are normal. Right eye exhibits no discharge. Left eye exhibits no discharge. No scleral icterus.   Neck: Normal range of motion. Neck supple. No JVD present. No tracheal deviation present. No thyromegaly present.   Cardiovascular: Normal rate, regular rhythm and normal heart sounds. Exam reveals no gallop and no friction rub.   No murmur heard.  Pulmonary/Chest: Effort normal and breath sounds normal. No stridor. No respiratory distress. She has no wheezes. She has no rales. She exhibits no tenderness.   Abdominal: Soft. Bowel sounds are normal. She exhibits no distension and no mass. There is no tenderness. There is no rebound and no guarding. No hernia.   Musculoskeletal: Normal range of motion. She exhibits no edema or tenderness.   Lymphadenopathy:     She has no cervical adenopathy.   Neurological: She is alert and oriented to person, place, and time. She displays normal reflexes. No cranial nerve deficit or sensory deficit. She exhibits normal muscle tone.   Skin: Skin is warm and dry. No rash noted. She is not diaphoretic. No erythema.   Psychiatric: She has a normal mood and affect. Her behavior is normal. Judgment and thought content normal.   Nursing note  and vitals reviewed.      Assessment:       1. Good hypertension control    2. Type 2 diabetes mellitus with diabetic polyneuropathy, without long-term current use of insulin    3. Vision loss, bilateral    4. Current moderate episode of major depressive disorder without prior episode    5. Hypertension associated with diabetes    6. Acquired hypothyroidism    7. Chronic maxillary sinusitis    8. Hyperlipidemia associated with type 2 diabetes mellitus    9. Severe obesity (BMI 35.0-39.9) with comorbidity        Plan:       1. Good hypertension control  Good control with no changes needed    2. Type 2 diabetes mellitus with diabetic polyneuropathy, without long-term current use of insulin  Lab Results   Component Value Date    HGBA1C 6.0 (H) 02/26/2019     Followed by Endocrinology doing well  - Ambulatory referral to Ophthalmology    3. Vision loss, bilateral  Unknown cause a vision loss.  - Ambulatory referral to Ophthalmology    4. Current moderate episode of major depressive disorder without prior episode  Well controlled  - buPROPion (WELLBUTRIN XL) 300 MG 24 hr tablet; Take 1 tablet (300 mg total) by mouth once daily.  Dispense: 90 tablet; Refill: 1    5. Hypertension associated with diabetes  Well controlled  - furosemide (LASIX) 20 MG tablet; Take 1 tablet (20 mg total) by mouth once daily.  Dispense: 90 tablet; Refill: 1  - metoprolol succinate (TOPROL-XL) 25 MG 24 hr tablet; Take 1 tablet (25 mg total) by mouth once daily.  Dispense: 90 tablet; Refill: 3    6. Acquired hypothyroidism  Lab Results   Component Value Date    TSH 1.151 02/26/2019     Well controlled with no changes needed  - levothyroxine (SYNTHROID) 75 MCG tablet; TAKE 1 TABLET DAILY BEFORE BREAKFAST  Dispense: 90 tablet; Refill: 3    7. Chronic maxillary sinusitis  Status post apparent treatment with doxycycline and incomplete resolution.  Will give a course of Levaquin  - levoFLOXacin (LEVAQUIN) 750 MG tablet; Take 1 tablet (750 mg total) by  mouth once daily. for 7 days  Dispense: 7 tablet; Refill: 0    8. Hyperlipidemia associated with type 2 diabetes mellitus  Lab Results   Component Value Date    CHOL 124 11/26/2018    CHOL 126 12/08/2017    CHOL 179 03/07/2017     Lab Results   Component Value Date    HDL 59 11/26/2018    HDL 53 12/08/2017    HDL 51 03/07/2017     Lab Results   Component Value Date    LDLCALC 44.4 (L) 11/26/2018    LDLCALC 53.6 (L) 12/08/2017    LDLCALC 106.8 03/07/2017     Lab Results   Component Value Date    TRIG 103 11/26/2018    TRIG 97 12/08/2017    TRIG 106 03/07/2017     Lab Results   Component Value Date    CHOLHDL 47.6 11/26/2018    CHOLHDL 42.1 12/08/2017    CHOLHDL 28.5 03/07/2017         9. Severe obesity (BMI 35.0-39.9) with comorbidity

## 2019-04-23 NOTE — PATIENT INSTRUCTIONS
Weight Management: Getting Started  Healthy bodies come in all shapes and sizes. Not all bodies are made to be thin. For some people, a healthy weight is higher than the average weight listed on weight charts. Your healthcare provider can help you decide on a healthy weight for you.    Reasons to lose weight  Losing weight can help with some health problems, such as high blood pressure, heart disease, diabetes, sleep apnea, and arthritis. You may also feel more energy.  Set your long-term goal  Your goal doesn't even have to be a specific weight. You may decide on a fitness goal (such as being able to walk 10 miles a week), or a health goal (such as lowering your blood pressure). Choose a goal that is measurable and reasonable, so you know when you've reached it. A goal of reaching a BMI of less than 25 is not always reasonable (or possible).   Make an action plan  Habits dont change overnight. Setting your goals too high can leave you feeling discouraged if you cant reach them. Be realistic. Choose one or two small changes you can make now. Set an action plan for how you are going to make these changes. When you can stick to this plan, keep making a few more small changes. Taking small steps will help you stay on the path to success.  Track your progress  Write down your goals. Then, keep a daily record of your progress. Write down what you eat and how active you are. This record lets you look back on how much youve done. It may also help when youre feeling frustrated. Reward yourself for success. Even if you dont reach every goal, give yourself credit for what you do get done.  Get support  Encouragement from others can help make losing weight easier. Ask your family members and friends for support. They may even want to join you. Also look to your healthcare provider, registered dietitian, and  for help. Your local hospital can give you more information about nutrition, exercise, and  weight loss.  Date Last Reviewed: 1/31/2016  © 5183-5577 [employer]. [address] [place] [address], [place] [address]. All rights reserved. This information is not intended as a substitute for professional medical care. Always follow your healthcare professional's instructions.        Controlling High Blood Pressure  High blood pressure (hypertension) is often called the silent killer. This is because many people who have it dont know it. High blood pressure is defined as 140/90 mm Hg or higher. Know your blood pressure and remember to check it regularly. Doing so can save your life. Here are some things you can do to help control your blood pressure.    Choose heart-healthy foods  · Select low-salt, low-fat foods. Limit sodium intake to 2,400 mg per day or the amount suggested by your healthcare provider.  · Limit canned, dried, cured, packaged, and fast foods. These can contain a lot of salt.  · Eat 8 to 10 servings of fruits and vegetables every day.  · Choose lean meats, fish, or chicken.  · Eat whole-grain pasta, brown rice, and beans.  · Eat 2 to 3 servings of low-fat or fat-free dairy products.  · Ask your doctor about the DASH eating plan. This plan helps reduce blood pressure.  · When you go to a restaurant, ask that your meal be prepared with no added salt.  Maintain a healthy weight  · Ask your healthcare provider how many calories to eat a day. Then stick to that number.  · Ask your healthcare provider what weight range is healthiest for you. If you are overweight, a weight loss of only 3% to 5% of your body weight can help lower blood pressure. Generally, a good weight loss goal is to lose 10% of your body weight in a year.  · Limit snacks and sweets.  · Get regular exercise.  Get up and get active  · Choose activities you enjoy. Find ones you can do with friends or family. This includes bicycling, dancing, walking, and jogging.  · Park farther away from building entrances.  · Use stairs  instead of the elevator.  · When you can, walk or bike instead of driving.  · Warfield leaves, garden, or do household repairs.  · Be active at a moderate to vigorous level of physical activity for at least 40 minutes for a minimum of 3 to 4 days a week.   Manage stress  · Make time to relax and enjoy life. Find time to laugh.  · Communicate your concerns with your loved ones and your healthcare provider.  · Visit with family and friends, and keep up with hobbies.  Limit alcohol and quit smoking  · Men should have no more than 2 drinks per day.  · Women should have no more than 1 drink per day.  · Talk with your healthcare provider about quitting smoking. Smoking significantly increases your risk for heart disease and stroke. Ask your healthcare provider about community smoking cessation programs and other options.  Medicines  If lifestyle changes arent enough, your healthcare provider may prescribe high blood pressure medicine. Take all medicines as prescribed. If you have any questions about your medicines, ask your healthcare provider before stopping or changing them.   Date Last Reviewed: 4/27/2016 © 2000-2017 [employer] [address] [place] [address], [place] [address]. All rights reserved. This information is not intended as a substitute for professional medical care. Always follow your healthcare professional's instructions.

## 2019-04-23 NOTE — LETTER
April 23, 2019      Noah Chowdhury MD  [address]  [facility] [address]           [place] [address] - [address]  [facility] [address]  Phone: [phone]          Patient: Franca Coffey   MR Number: [id number]   YOB: 1950   Date of Visit: 4/23/2019       Dear Dr. Noah Chowdhury:    Thank you for referring Franca Coffey to me for evaluation. Attached you will find relevant portions of my assessment and plan of care.    If you have questions, please do not hesitate to call me. I look forward to following Franca Coffey along with you.    Sincerely,    Norma Jordan MD    Enclosure  CC:  No Recipients    If you would like to receive this communication electronically, please contact [email or web address] or [phone] to request more information on [employer] access.    For providers and/or their staff who would like to refer a patient to [facility], please contact us through our one-stop-shop provider referral line, [facility], at [phone].    If you feel you have received this communication in error or would no longer like to receive these types of communications, please e-mail [email or web address]

## 2019-04-24 NOTE — PROGRESS NOTES
"HPI     68 YO female presents today for a visit referred by Dr. Chowdhury. She   states that she was being seen at [facility] and was told that   she had an abnormal visual field but was not told what was wrong just told   to follow-up in 6 months. She told Dr. Chowdhury about this experience and he   told her to come see us today. She C/O eye pain when tired, cannot see as   well "things come and go. She does have floaters but has been experiencing   them for years. Also notes she cannot read anything written in red.     Noticed blurred vision about 2 months ago. Said during routine exam, she   could not see peripherally on confrontation. Was referred, was told she   did not do very well, that she had lost vision. States she had a minor   stroke around 12/1/18, may have started around that time. The difficulty   with seeing red was just last night. Topamax dose recently increased from   50 mg PO QD to BID, but taking only once daily. States Dr. Chowdhury   requested records from [facility].    Lab Results       Component                Value               Date                       HGBA1C                   6.0 (H)             02/26/2019                 HGBA1C                   5.8 (H)             11/26/2018                 HGBA1C                   5.7 (H)             02/27/2018            No results found for: LABA1C    MRI without contrast from [facility] reviewed 1/11/19, no orbital abnormality.      Last edited by Norma Jrodan MD on 4/23/2019  3:25 PM.   (History)        ROS     Positive for: Gastrointestinal (Librax for IBS), Neurological (CVA   12/1/2018), Musculoskeletal (Cymbalta for fibromyalgia), Endocrine (DM   controlled per pt with diet/exercise; hypothyroid), Cardiovascular (HTN -   controlled with meds per pt), Eyes (pseudophakia OU), Psychiatric   (anxiety/depression)    Negative for: Respiratory (denies asthma)    Last edited by Norma Jordan MD on 4/23/2019  3:18 PM. "   (History)        Assessment /Plan     For exam results, see Encounter Report.    Blurred vision  -     [place] Visual Field - OU - Extended - Both Eyes; Future    Pseudophakia of both eyes    Refractive error        MRI/MRA report reviewed, no recent significant abnormality. No contrast was used on MRI. Orbits WNL  Vision did not seem to improve with MRx.  Aside from retinal findings consistent with high myopia, no significant intraocular abnormalities noted, consistent with last exam with Dr. Gonzales on 9/30/16, although at that time she corrected to 20/35 OU. Optic nerves appear pink.  OD pupil appears sluggish?  Pt reports her dose of Topamax was recently increased from 50 mg PO QD (long-standing) to 100 BID. States she is taking 100 QD. Sounds like this change may have been made around the time of onset of vision changes. Medline search does show case reports of rare visual field defects associated with Topiramate. Will obtain HVF, also request HVF result from [facility] to look for changes/progression.    Re-check MRx at next visit - MD to check

## 2019-04-25 ENCOUNTER — PATIENT OUTREACH (OUTPATIENT)
Dept: ADMINISTRATIVE | Facility: HOSPITAL | Age: 69
End: 2019-04-25

## 2019-05-03 ENCOUNTER — OFFICE VISIT (OUTPATIENT)
Dept: OPHTHALMOLOGY | Facility: CLINIC | Age: 69
End: 2019-05-03
Payer: MEDICARE

## 2019-05-03 ENCOUNTER — TELEPHONE (OUTPATIENT)
Dept: ENDOCRINOLOGY | Facility: CLINIC | Age: 69
End: 2019-05-03

## 2019-05-03 ENCOUNTER — CLINICAL SUPPORT (OUTPATIENT)
Dept: OPHTHALMOLOGY | Facility: CLINIC | Age: 69
End: 2019-05-03
Payer: MEDICARE

## 2019-05-03 DIAGNOSIS — H53.483 VISUAL FIELD CONTRACTION, BILATERAL: ICD-10-CM

## 2019-05-03 DIAGNOSIS — Z96.1 PSEUDOPHAKIA OF BOTH EYES: ICD-10-CM

## 2019-05-03 DIAGNOSIS — H53.8 BLURRED VISION: ICD-10-CM

## 2019-05-03 DIAGNOSIS — H52.7 REFRACTIVE ERROR: ICD-10-CM

## 2019-05-03 DIAGNOSIS — H53.8 BLURRED VISION: Primary | ICD-10-CM

## 2019-05-03 DIAGNOSIS — G43.909 MIGRAINE WITHOUT STATUS MIGRAINOSUS, NOT INTRACTABLE, UNSPECIFIED MIGRAINE TYPE: ICD-10-CM

## 2019-05-03 PROCEDURE — 92083 HUMPHREY VISUAL FIELD - OU - BOTH EYES: ICD-10-PCS | Mod: S$GLB,,, | Performed by: OPHTHALMOLOGY

## 2019-05-03 PROCEDURE — 92083 EXTENDED VISUAL FIELD XM: CPT | Mod: S$GLB,,, | Performed by: OPHTHALMOLOGY

## 2019-05-03 PROCEDURE — 92012 PR EYE EXAM, EST PATIENT,INTERMED: ICD-10-PCS | Mod: S$GLB,,, | Performed by: OPHTHALMOLOGY

## 2019-05-03 PROCEDURE — 92012 INTRM OPH EXAM EST PATIENT: CPT | Mod: S$GLB,,, | Performed by: OPHTHALMOLOGY

## 2019-05-03 PROCEDURE — 99999 PR PBB SHADOW E&M-EST. PATIENT-LVL III: CPT | Mod: PBBFAC,,, | Performed by: OPHTHALMOLOGY

## 2019-05-03 PROCEDURE — 99999 PR PBB SHADOW E&M-EST. PATIENT-LVL III: ICD-10-PCS | Mod: PBBFAC,,, | Performed by: OPHTHALMOLOGY

## 2019-05-03 RX ORDER — TOPIRAMATE 100 MG/1
50 TABLET, FILM COATED ORAL DAILY
Qty: 15 TABLET | Refills: 0
Start: 2019-05-03 | End: 2020-05-02

## 2019-05-03 NOTE — Clinical Note
Ms. Coffey is reporting peripheral vision changes and fluctuating vision since increasing her dose of Topamax. She said she only increased it from 50 PO QD to 100 PO QD, not BID. Peripheral vision changes can be a very rare adverse reaction to topamax. Aside from that, I do not have a good explanation for her symptoms. I would like to know if she can be weaned off, or at least back down to her prior dose, then repeat visual field testing to see if it improves. If not, then I will refer to neuro-ophthalmology.

## 2019-05-03 NOTE — PROGRESS NOTES
HPI     68 YO female presents today for a HVF and MRx re-check with MD. She   states that she is doing well, no problems or complaints.     Last edited by Melanie Chowdhury, PCT on 5/3/2019 10:35 AM. (History)            Assessment /Plan     For exam results, see Encounter Report.    Blurred vision    Visual field contraction, bilateral    Pseudophakia of both eyes    Refractive error          MRI/MRA report reviewed, no recent significant abnormality. No contrast was used on MRI. Orbits WNL  Vision corrects to 20/20-25 today, but VERY slow  Aside from retinal findings consistent with high myopia, no significant intraocular abnormalities noted, consistent with last exam with Dr. Gonzales on 9/30/16, although at that time she corrected to 20/25 OU. Optic nerves appear pink.  OD pupil still appears sluggish?  Pt reports her dose of Topamax was recently increased from 50 mg PO QD (long-standing) to 100 BID. States she is taking 100 QD. Sounds like this change may have been made around the time of onset of vision changes. Medline search does show case reports of rare visual field defects associated with Topiramate. Will message PCP and Endocrine to see if she can be tapered back down or even off. Repeat HVF in 3 months.     MRx given

## 2019-05-07 ENCOUNTER — TELEPHONE (OUTPATIENT)
Dept: OPHTHALMOLOGY | Facility: CLINIC | Age: 69
End: 2019-05-07

## 2019-05-07 NOTE — TELEPHONE ENCOUNTER
Attempted to call patient in regards to scheduling appt, patient did not answer and unable to LM due to full VM. Scheduled next available appt for HVF and follow-up, will try patient again later.

## 2019-05-13 ENCOUNTER — OFFICE VISIT (OUTPATIENT)
Dept: PAIN MEDICINE | Facility: CLINIC | Age: 69
End: 2019-05-13
Payer: MEDICARE

## 2019-05-13 VITALS
BODY MASS INDEX: 36.25 KG/M2 | HEART RATE: 67 BPM | WEIGHT: 197 LBS | HEIGHT: 62 IN | SYSTOLIC BLOOD PRESSURE: 124 MMHG | DIASTOLIC BLOOD PRESSURE: 71 MMHG

## 2019-05-13 DIAGNOSIS — M79.18 MYOFASCIAL PAIN: Primary | ICD-10-CM

## 2019-05-13 DIAGNOSIS — M47.896 OTHER SPONDYLOSIS, LUMBAR REGION: ICD-10-CM

## 2019-05-13 DIAGNOSIS — M51.36 DDD (DEGENERATIVE DISC DISEASE), LUMBAR: ICD-10-CM

## 2019-05-13 PROCEDURE — 99213 OFFICE O/P EST LOW 20 MIN: CPT | Mod: S$GLB,,, | Performed by: ANESTHESIOLOGY

## 2019-05-13 PROCEDURE — 99213 PR OFFICE/OUTPT VISIT, EST, LEVL III, 20-29 MIN: ICD-10-PCS | Mod: S$GLB,,, | Performed by: ANESTHESIOLOGY

## 2019-05-13 PROCEDURE — 3074F SYST BP LT 130 MM HG: CPT | Mod: CPTII,S$GLB,, | Performed by: ANESTHESIOLOGY

## 2019-05-13 PROCEDURE — 99999 PR PBB SHADOW E&M-EST. PATIENT-LVL V: CPT | Mod: PBBFAC,,, | Performed by: ANESTHESIOLOGY

## 2019-05-13 PROCEDURE — 1101F PR PT FALLS ASSESS DOC 0-1 FALLS W/OUT INJ PAST YR: ICD-10-PCS | Mod: CPTII,S$GLB,, | Performed by: ANESTHESIOLOGY

## 2019-05-13 PROCEDURE — 1101F PT FALLS ASSESS-DOCD LE1/YR: CPT | Mod: CPTII,S$GLB,, | Performed by: ANESTHESIOLOGY

## 2019-05-13 PROCEDURE — 99999 PR PBB SHADOW E&M-EST. PATIENT-LVL V: ICD-10-PCS | Mod: PBBFAC,,, | Performed by: ANESTHESIOLOGY

## 2019-05-13 PROCEDURE — 3078F PR MOST RECENT DIASTOLIC BLOOD PRESSURE < 80 MM HG: ICD-10-PCS | Mod: CPTII,S$GLB,, | Performed by: ANESTHESIOLOGY

## 2019-05-13 PROCEDURE — 3074F PR MOST RECENT SYSTOLIC BLOOD PRESSURE < 130 MM HG: ICD-10-PCS | Mod: CPTII,S$GLB,, | Performed by: ANESTHESIOLOGY

## 2019-05-13 PROCEDURE — 3078F DIAST BP <80 MM HG: CPT | Mod: CPTII,S$GLB,, | Performed by: ANESTHESIOLOGY
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
## 2019-05-15 ENCOUNTER — OFFICE VISIT (OUTPATIENT)
Dept: ORTHOPEDICS | Facility: CLINIC | Age: 69
End: 2019-05-15
Payer: MEDICARE

## 2019-05-15 VITALS
DIASTOLIC BLOOD PRESSURE: 79 MMHG | WEIGHT: 197 LBS | BODY MASS INDEX: 36.25 KG/M2 | HEIGHT: 62 IN | SYSTOLIC BLOOD PRESSURE: 176 MMHG | HEART RATE: 64 BPM

## 2019-05-15 DIAGNOSIS — M17.11 ARTHRITIS OF RIGHT KNEE: Primary | ICD-10-CM

## 2019-05-15 PROCEDURE — 99999 PR PBB SHADOW E&M-EST. PATIENT-LVL III: ICD-10-PCS | Mod: PBBFAC,,, | Performed by: ORTHOPAEDIC SURGERY

## 2019-05-15 PROCEDURE — 3077F PR MOST RECENT SYSTOLIC BLOOD PRESSURE >= 140 MM HG: ICD-10-PCS | Mod: CPTII,S$GLB,, | Performed by: ORTHOPAEDIC SURGERY

## 2019-05-15 PROCEDURE — 3077F SYST BP >= 140 MM HG: CPT | Mod: CPTII,S$GLB,, | Performed by: ORTHOPAEDIC SURGERY

## 2019-05-15 PROCEDURE — 3078F DIAST BP <80 MM HG: CPT | Mod: CPTII,S$GLB,, | Performed by: ORTHOPAEDIC SURGERY

## 2019-05-15 PROCEDURE — 1101F PR PT FALLS ASSESS DOC 0-1 FALLS W/OUT INJ PAST YR: ICD-10-PCS | Mod: CPTII,S$GLB,, | Performed by: ORTHOPAEDIC SURGERY

## 2019-05-15 PROCEDURE — 3078F PR MOST RECENT DIASTOLIC BLOOD PRESSURE < 80 MM HG: ICD-10-PCS | Mod: CPTII,S$GLB,, | Performed by: ORTHOPAEDIC SURGERY

## 2019-05-15 PROCEDURE — 1101F PT FALLS ASSESS-DOCD LE1/YR: CPT | Mod: CPTII,S$GLB,, | Performed by: ORTHOPAEDIC SURGERY

## 2019-05-15 PROCEDURE — 99214 OFFICE O/P EST MOD 30 MIN: CPT | Mod: 57,S$GLB,, | Performed by: ORTHOPAEDIC SURGERY

## 2019-05-15 PROCEDURE — 99214 PR OFFICE/OUTPT VISIT, EST, LEVL IV, 30-39 MIN: ICD-10-PCS | Mod: 57,S$GLB,, | Performed by: ORTHOPAEDIC SURGERY

## 2019-05-15 PROCEDURE — 99999 PR PBB SHADOW E&M-EST. PATIENT-LVL III: CPT | Mod: PBBFAC,,, | Performed by: ORTHOPAEDIC SURGERY

## 2019-05-15 RX ORDER — METHYLPREDNISOLONE 4 MG/1
TABLET ORAL
Qty: 1 PACKAGE | Refills: 0 | Status: SHIPPED | OUTPATIENT
Start: 2019-05-15 | End: 2019-06-05

## 2019-05-15 RX ORDER — METHYLPREDNISOLONE 4 MG/1
TABLET ORAL
Qty: 1 PACKAGE | Refills: 0 | Status: CANCELLED | OUTPATIENT
Start: 2019-05-15 | End: 2019-06-05

## 2019-05-15 NOTE — PROGRESS NOTES
Past Medical History:   Diagnosis Date    Anxiety     Cataract     Depression     Diabetes mellitus     managing with diet    Dizzy     Fibromyalgia     GERD (gastroesophageal reflux disease)     History of bladder infections     Hypertension     Hypothyroid     IBS (irritable bowel syndrome)     Kidney stones     Meniere disease     Migraine     Muscle spasms of head and/or neck     and lower ext    Osteoarthritis     Osteopenia     PONV (postoperative nausea and vomiting)     severe-cause by morphine per pt    Sleep apnea     CPAP    Vision disorder        Past Surgical History:   Procedure Laterality Date    [id number] Left 8/3/2018    [facility] at [facility] OR    ARTHROSCOPY, KNEE Right 10/15/2013    [facility] by [facility] at [facility] OR    ARTHROSCOPY, KNEE, WITH MENISCECTOMY Right 9/18/2018    [facility]    BLADDER SUSPENSION      BLOCK-NERVE Left 6/17/2016    [facility] at [facility] OR    BLOCK-NERVE-MEDIAL BRANCH-LUMBAR Bilateral 8/1/2014    [facility]    BREAST BIOPSY      CHONDRECTOMY, KNEE, SEMILUNAR CARTILAGE Right 10/15/2013    [facility]formed by [facility] Jr[facility] at [facility] OR    CHONDROPLASTY-CONDYLE Right 10/15/2013    [facility] by [facility] at [facility] OR    COLONOSCOPY  8/27/14    Dr. Thomas, 5 year recheck    COLONOSCOPY N/A 8/27/2014    [facility] ENDO    dental implant      upper RT implant    EGD (ESOPHAGOGASTRODUODENOSCOPY) N/A 4/15/2013    [facility] ENDO    ESOPHAGOGASTRODUODENOSCOPY (EGD) N/A 6/27/2016    [facility] ENDO    EYE SURGERY      bilateral PHACO with IOL    FOOT SURGERY Bilateral 12/05/2008    bunionectomy    FRACTURE SURGERY      HYSTERECTOMY      Injection,steroid,epidural,transforaminal approach L4-5  Left 2/4/2019    [facility]    INJECTION-STEROID-EPIDURAL-LUMBAR N/A 12/23/2014    [facility] at [facility]    JOINT REPLACEMENT Left     Partial knee    JOINT REPLACEMENT Left     Total knee replacement    KNEE ARTHROSCOPY Bilateral     MANDIBLE FRACTURE SURGERY      MENISCECTOMY, KNEE, MEDIAL Right 9/18/2018    [facility]    MULTIPLE TOOTH EXTRACTIONS      radiofrequency ablation of the genicular branches of the left knee (4) Left 8/12/2016    [facility]    Radiofrequency Ablation, Nerve, Spinal, Lumbar, Medial Branch, L3,4,5 Bilateral 4/1/2019    [facility] at [facility]    RADIOFREQUENCY THERMOCOAGULATION (RFTC)-NERVE-MEDIAN BRANCH-LUMBAR Bilateral 2/16/2018    [facility]formed [facility] at [facility]    RADIOFREQUENCY THERMOCOAGULATION (RFTC)-NERVE-MEDIAN BRANCH-LUMBAR Bilateral 8/8/2014    [facility] at [facility] OR    revision of mesh      repair to bladder suspension    SYNOVECTOMY, KNEE Right 10/15/2013    [facility]    TONSILLECTOMY, ADENOIDECTOMY      UPPER GASTROINTESTINAL ENDOSCOPY      VAGINAL DELIVERY      times 2       Current Outpatient Medications   Medication Sig    aspirin 81 MG Chew Take 1 tablet (81 mg total) by mouth once daily.    atorvastatin (LIPITOR) 40 MG tablet Take 1 tablet (40 mg total) by mouth once daily.    buPROPion (WELLBUTRIN XL) 300 MG 24 hr tablet Take 1 tablet (300 mg total) by mouth once daily.    celecoxib (CELEBREX) 100 MG capsule Take 1 capsule (100 mg total) by mouth 2 (two) times daily.    cetirizine (ZYRTEC) 10 MG tablet TK 1 T PO QHS    chlordiazepoxide-clidinium 5-2.5 mg (LIBRAX) 5-2.5 mg Cap Take by mouth. 2 qam, 1 q noon, 1 qhs    DULoxetine (CYMBALTA) 60 MG capsule Take 1 capsule (60 mg total) by mouth once daily.    esomeprazole (NEXIUM) 40 MG capsule Take 1 capsule  (40 mg total) by mouth once daily.    furosemide (LASIX) 20 MG tablet Take 1 tablet (20 mg total) by mouth once daily.    gabapentin (NEURONTIN) 100 MG capsule Take 1 capsule (100 mg total) by mouth once daily.    gabapentin (NEURONTIN) 400 MG capsule Take 1 capsule (400 mg total) by mouth every evening.    levothyroxine (SYNTHROID) 75 MCG tablet TAKE 1 TABLET DAILY BEFORE BREAKFAST    lisinopril (PRINIVIL,ZESTRIL) 20 MG tablet Take 1 tablet (20 mg total) by mouth once daily.    metoprolol succinate (TOPROL-XL) 25 MG 24 hr tablet Take 1 tablet (25 mg total) by mouth once daily.    multivitamin (ONE DAILY MULTIVITAMIN) per tablet Take 1 tablet by mouth once daily.    topiramate (TOPAMAX) 100 MG tablet Take 0.5 tablets (50 mg total) by mouth once daily.    TRUE METRIX AIR GLUCOSE METER kit     TRUE METRIX GLUCOSE TEST STRIP Strp Check BG 1x daily.    TRUE METRIX LEVEL 1 Soln     TRUEPLUS LANCETS 28 gauge Misc Use to test blood glucose 1x daily.     No current facility-administered medications for this visit.        Review of patient's allergies indicates:   Allergen Reactions    Sulfa (sulfonamide antibiotics) Hives, Itching and Rash    Penicillins      Other reaction(s): Unknown. Childhood reaction. Pt does not remember reaction.    Adhesive Itching and Rash     Paper tape OK    Garlic Diarrhea    Morphine Nausea And Vomiting       Family History   Problem Relation Age of Onset    Diabetes Mother     Heart disease Mother     Kidney disease Mother     Hypertension Mother     Hyperlipidemia Mother     Diabetes Mellitus Mother     Heart disease Father     Diabetes Father     Hypertension Father     Hyperlipidemia Father     Diabetes Mellitus Father     COPD Father     Heart disease Brother     Cancer Brother     Early death Brother     Stomach cancer Brother     Breast cancer Maternal Aunt     Heart disease Brother     Ovarian cancer Neg Hx     Cataracts Neg Hx     Glaucoma Neg Hx      Macular degeneration Neg Hx     Retinal detachment Neg Hx     Thyroid disease Neg Hx     Stroke Neg Hx     Rheum arthritis Neg Hx     Psoriasis Neg Hx     Osteoarthritis Neg Hx     Lupus Neg Hx     Inflammatory bowel disease Neg Hx     Depression Neg Hx     Chronic back pain Neg Hx     Asthma Neg Hx        Social History     Socioeconomic History    Marital status:      Spouse name: Not on file    Number of children: Not on file    Years of education: Not on file    Highest education level: Not on file   Occupational History    Not on file   Social Needs    Financial resource strain: Not on file    Food insecurity:     Worry: Not on file     Inability: Not on file    Transportation needs:     Medical: Not on file     Non-medical: Not on file   Tobacco Use    Smoking status: Former Smoker     Packs/day: 0.25     Years: 1.00     Pack years: 0.25     Last attempt to quit: 10/15/1966     Years since quittin.6    Smokeless tobacco: Never Used   Substance and Sexual Activity    Alcohol use: Yes     Comment: occasionally    Drug use: No    Sexual activity: Not Currently     Partners: Male     Birth control/protection: Post-menopausal, Surgical   Lifestyle    Physical activity:     Days per week: Not on file     Minutes per session: Not on file    Stress: Not on file   Relationships    Social connections:     Talks on phone: Not on file     Gets together: Not on file     Attends Rastafari service: Not on file     Active member of club or organization: Not on file     Attends meetings of clubs or organizations: Not on file     Relationship status: Not on file   Other Topics Concern    Not on file   Social History Narrative    Not on file       Chief Complaint:   Chief Complaint   Patient presents with    Knee Pain     R knee 2mo f/u       Date of surgery:  2018 right knee arthroscopy    History of present illness:  69-year-old female who underwent knee arthroscopy.   Patient had pretty severe arthritic change of her right knee.  Pain is a 2/10.  No erythema or drainage.  Good range of motion. Patient twisted her knee over the week in.  Increased pain particularly medially. Difficulty to walk.  Gives out at times.  Rates her knee pain is a 10/10 at times.      Review of Systems:    Musculoskeletal:  See HPI        Physical Examination:    Vital Signs:    There were no vitals filed for this visit.    Body mass index is 36.03 kg/m².    This a well-developed, well nourished patient in no acute distress.  They are alert and oriented and cooperative to examination.  Pt. walks without an antalgic gait.      Examination of the right knee shows healed  surgical portals.  No erythema or drainage.  Patient has full range of motion. No calf pain. Negative Homans sign.  Mild patellofemoral crepitus.  Tenderness over the medial joint line.    Heart is regular rate without obvious murmurs   Normal respiratory effort without audible wheezing  Abdomen is soft and nontender     X-rays:  X-rays of the right knee are available for review which show moderate to severe arthritic change     Assessment::  Status post right partial medial meniscectomy with 3 compartment chondroplasty  Right knee arthritis    Plan:  I reviewed the findings with her today.  We talked about knee replacement.  I think that is the next step for her knee. Gave her a Toradol injection and a Medrol Dosepak to help with her acute pain and to hopefully buy her time until the surgery. Plan is for right Raven triathlon total knee arthroplasty.  Risks, benefits, and alternatives to the procedure were explained to the patient including but not limited to damage to nerves, arteries, blood vessels, bones, tendons, ligaments, stiffness, instability, infection, DVT, PE, as well as general anesthetic complications including seizure, stroke, heart attack and even death. The patient understood these risks and wished to proceed and signed  the informed consent.       This note was created using [employer] voice recognition software that occasionally misinterpreted phrases or words.

## 2019-05-17 ENCOUNTER — TELEPHONE (OUTPATIENT)
Dept: CARDIOLOGY | Facility: CLINIC | Age: 69
End: 2019-05-17

## 2019-05-17 NOTE — TELEPHONE ENCOUNTER
Patient has not been seen since 12/2017. Will calixto office visit prior to clearance.  Made first available appt for 7/1/19 @ 3p, also added to wait list.

## 2019-05-17 NOTE — TELEPHONE ENCOUNTER
----- Message from Luciana Rodriguez LPN sent at 5/17/2019  3:39 PM CDT -----  Pt is scheduled for TKA with Dr. Cuellar on 7/9/19. Pt requires cardiac clearance and permission to stop any blood thinners 5 days prior to sx. Please update chart with clearance prior to sx date. Thanks!

## 2019-05-21 PROBLEM — M17.11 ARTHRITIS OF RIGHT KNEE: Status: ACTIVE | Noted: 2019-05-21

## 2019-05-21 RX ORDER — MUPIROCIN 20 MG/G
OINTMENT TOPICAL
Status: CANCELLED | OUTPATIENT
Start: 2019-05-21

## 2019-06-10 DIAGNOSIS — E11.42 TYPE 2 DIABETES MELLITUS WITH DIABETIC POLYNEUROPATHY, WITHOUT LONG-TERM CURRENT USE OF INSULIN: ICD-10-CM

## 2019-06-10 DIAGNOSIS — I10 ESSENTIAL HYPERTENSION: ICD-10-CM

## 2019-06-10 RX ORDER — LISINOPRIL 20 MG/1
20 TABLET ORAL DAILY
Qty: 90 TABLET | Refills: 3 | Status: SHIPPED | OUTPATIENT
Start: 2019-06-10 | End: 2020-06-09

## 2019-06-10 RX ORDER — GABAPENTIN 400 MG/1
400 CAPSULE ORAL NIGHTLY
Qty: 90 CAPSULE | Refills: 3 | Status: SHIPPED | OUTPATIENT
Start: 2019-06-10 | End: 2020-06-09

## 2019-06-10 RX ORDER — GABAPENTIN 100 MG/1
100 CAPSULE ORAL DAILY
Qty: 90 CAPSULE | Refills: 3 | Status: SHIPPED | OUTPATIENT
Start: 2019-06-10

## 2019-06-24 ENCOUNTER — TELEPHONE (OUTPATIENT)
Dept: ORTHOPEDICS | Facility: CLINIC | Age: 69
End: 2019-06-24

## 2019-06-24 NOTE — TELEPHONE ENCOUNTER
Returned call and left message advising we will cancel sx per request. Advised to return call with any questions.

## 2019-06-24 NOTE — TELEPHONE ENCOUNTER
----- Message from Bree Osborn sent at 6/24/2019  2:53 PM CDT -----  Contact: Patient  Type: Needs Medical Advice    Who Called:  Patient  Symptoms (please be specific):  na  How long has patient had these symptoms:  antonia  Pharmacy name and phone #:  [facility]  Best Call Back Number: [phone] (home)    Additional Information: Patient requesting a call back regarding her upcoming procedure on 7/9/19. Patient has moved to [place] and she will not be having the surgery, states that she will find a provider in [place]. Please call to advise, thank you!

## 2019-07-03 ENCOUNTER — PATIENT MESSAGE (OUTPATIENT)
Dept: GASTROENTEROLOGY | Facility: CLINIC | Age: 69
End: 2019-07-03

## 2019-07-06 ENCOUNTER — PATIENT MESSAGE (OUTPATIENT)
Dept: GASTROENTEROLOGY | Facility: CLINIC | Age: 69
End: 2019-07-06

## 2019-07-09 ENCOUNTER — PATIENT MESSAGE (OUTPATIENT)
Dept: GASTROENTEROLOGY | Facility: CLINIC | Age: 69
End: 2019-07-09

## 2019-09-27 DIAGNOSIS — E11.9 TYPE 2 DIABETES MELLITUS WITHOUT COMPLICATION: ICD-10-CM

## 2019-09-27 DIAGNOSIS — Z12.39 BREAST CANCER SCREENING: ICD-10-CM

## 2019-11-05 DIAGNOSIS — F32.1 CURRENT MODERATE EPISODE OF MAJOR DEPRESSIVE DISORDER WITHOUT PRIOR EPISODE: ICD-10-CM

## 2019-11-05 RX ORDER — BUPROPION HYDROCHLORIDE 300 MG/1
TABLET ORAL
Qty: 90 TABLET | Refills: 1 | Status: SHIPPED | OUTPATIENT
Start: 2019-11-05

## 2019-12-04 NOTE — DISCHARGE SUMMARY
[facility]  Discharge Note  Short Stay    Admit Date: 2/4/2019    Discharge Date and Time: 2/4/2019    Attending Physician: Delmar Rosas MD     Discharge Provider: Delmar Rosas    Diagnoses:  Active Hospital Problems    Diagnosis  POA    *Lumbar radiculitis [M54.16]  Yes      Resolved Hospital Problems   No resolved problems to display.       Hospital Course: Lumbar TARSHA  Discharged Condition: Good    Final Diagnoses:   Active Hospital Problems    Diagnosis  POA    *Lumbar radiculitis [M54.16]  Yes      Resolved Hospital Problems   No resolved problems to display.       Disposition: Home or Self Care    Follow up/Patient Instructions:    Medications:  Reconciled Home Medications:      Medication List      START taking these medications    HYDROcodone-acetaminophen 7.5-325 mg per tablet  Commonly known as:  NORCO  Take 1 tablet by mouth every 6 (six) hours as needed for Pain.        CONTINUE taking these medications    aspirin 81 MG Chew  Take 1 tablet (81 mg total) by mouth once daily.     atorvastatin 40 MG tablet  Commonly known as:  LIPITOR  TAKE 1 TABLET EVERY DAY     b complex vitamins tablet  Take 1 tablet by mouth once daily.     buPROPion 300 MG 24 hr tablet  Commonly known as:  WELLBUTRIN XL  TAKE 1 TABLET EVERY DAY     celecoxib 100 MG capsule  Commonly known as:  CeleBREX  Take 1 capsule (100 mg total) by mouth 2 (two) times daily.     chlordiazepoxide-clidinium 5-2.5 mg 5-2.5 mg Cap  Commonly known as:  LIBRAX  Take by mouth. 2 qam, 1 q noon, 1 qhs     DULoxetine 60 MG capsule  Commonly known as:  CYMBALTA  Take 1 capsule (60 mg total) by mouth once daily.     esomeprazole 40 MG capsule  Commonly known as:  NEXIUM  TAKE 1 CAPSULE EVERY DAY     furosemide 20 MG tablet  Commonly known as:  LASIX  Take 1 tablet (20 mg total) by mouth once daily.     * gabapentin 100 MG capsule  Commonly known as:  NEURONTIN  TAKE 1 CAPSULE BY MOUTH ONCE DAILY     * gabapentin 400 MG capsule  Commonly known as:   NEURONTIN  Take 1 capsule (400 mg total) by mouth every evening.     levothyroxine 75 MCG tablet  Commonly known as:  SYNTHROID  TAKE 1 TABLET DAILY BEFORE BREAKFAST     lisinopril 10 MG tablet     metoprolol succinate 25 MG 24 hr tablet  Commonly known as:  TOPROL-XL  Take 1 tablet (25 mg total) by mouth once daily.     topiramate 50 MG tablet  Commonly known as:  TOPAMAX  TAKE 1 TABLET EVERY DAY     TRUE METRIX AIR GLUCOSE METER kit  Generic drug:  blood-glucose meter     TRUE METRIX GLUCOSE TEST STRIP Strp  Generic drug:  blood sugar diagnostic     TRUE METRIX LEVEL 1 Soln  Generic drug:  blood glucose control, low     TRUEPLUS LANCETS 28 gauge Misc  Generic drug:  lancets     VITAMIN D2 50,000 unit Cap  Generic drug:  ergocalciferol  Take 50,000 Units by mouth every 7 days.         * This list has 2 medication(s) that are the same as other medications prescribed for you. Read the directions carefully, and ask your doctor or other care provider to review them with you.              Discharge Procedure Orders   Call MD for:  temperature >100.4     Call MD for:  persistent nausea and vomiting or diarrhea     Call MD for:  severe uncontrolled pain     Call MD for:  redness, tenderness, or signs of infection (pain, swelling, redness, odor or green/yellow discharge around incision site)     Call MD for:  difficulty breathing or increased cough     Call MD for:  severe persistent headache        Follow up with MD in 2-3 weeks    Discharge Procedure Orders (must include Diet, Follow-up, Activity):   Discharge Procedure Orders (must include Diet, Follow-up, Activity)   Call MD for:  temperature >100.4     Call MD for:  persistent nausea and vomiting or diarrhea     Call MD for:  severe uncontrolled pain     Call MD for:  redness, tenderness, or signs of infection (pain, swelling, redness, odor or green/yellow discharge around incision site)     Call MD for:  difficulty breathing or increased cough     Call MD for:   severe persistent headache         WDL

## 2019-12-27 DIAGNOSIS — E11.9 TYPE 2 DIABETES MELLITUS WITHOUT COMPLICATION: ICD-10-CM

## 2019-12-31 DIAGNOSIS — E11.42 TYPE 2 DIABETES MELLITUS WITH DIABETIC POLYNEUROPATHY, WITHOUT LONG-TERM CURRENT USE OF INSULIN: ICD-10-CM

## 2020-01-01 RX ORDER — GABAPENTIN 400 MG/1
400 CAPSULE ORAL NIGHTLY
Qty: 90 CAPSULE | Refills: 3 | OUTPATIENT
Start: 2020-01-01 | End: 2020-12-31

## 2020-01-02 NOTE — TELEPHONE ENCOUNTER
Overdue for diabetic and cholesterol labs, foot exam.  Cancelled appt with endocrine months ago and has not rescheduled.  Refill denied.

## 2020-01-04 NOTE — TELEPHONE ENCOUNTER
Notified patient of refill denial with notes from Dr. Chowdhury. Patient states she cannot schedule labs or endo at the moment because she does not know when she will be back from [place]. Patient states she is going to see a podiatrist in [place] and will have those records sent to [facility] to update her records.

## 2020-03-27 DIAGNOSIS — I15.2 HYPERTENSION ASSOCIATED WITH DIABETES: ICD-10-CM

## 2020-03-27 DIAGNOSIS — E11.59 HYPERTENSION ASSOCIATED WITH DIABETES: ICD-10-CM

## 2020-03-27 RX ORDER — METOPROLOL SUCCINATE 25 MG/1
TABLET, EXTENDED RELEASE ORAL
Qty: 90 TABLET | Refills: 0 | Status: SHIPPED | OUTPATIENT
Start: 2020-03-27 | End: 2020-09-03

## 2020-03-27 NOTE — TELEPHONE ENCOUNTER
Has not been seen in almost a year, needs follow-up visit and checkup once corona virus epidemic has cooled down

## 2020-03-30 DIAGNOSIS — G43.909 MIGRAINE WITHOUT STATUS MIGRAINOSUS, NOT INTRACTABLE, UNSPECIFIED MIGRAINE TYPE: ICD-10-CM

## 2020-03-30 RX ORDER — TOPIRAMATE 100 MG/1
TABLET, FILM COATED ORAL
Qty: 180 TABLET | OUTPATIENT
Start: 2020-03-30

## 2020-04-27 DIAGNOSIS — E03.9 ACQUIRED HYPOTHYROIDISM: ICD-10-CM

## 2020-04-27 RX ORDER — LEVOTHYROXINE SODIUM 75 UG/1
TABLET ORAL
Qty: 90 TABLET | Refills: 3 | Status: SHIPPED | OUTPATIENT
Start: 2020-04-27

## 2020-05-05 ENCOUNTER — PATIENT MESSAGE (OUTPATIENT)
Dept: ADMINISTRATIVE | Facility: HOSPITAL | Age: 70
End: 2020-05-05

## 2020-05-26 ENCOUNTER — PATIENT OUTREACH (OUTPATIENT)
Dept: ADMINISTRATIVE | Facility: HOSPITAL | Age: 70
End: 2020-05-26

## 2020-05-26 NOTE — LETTER
June 2, 2020    Franca Coffey  [address]  [place] [address]             [facility]  [address]  [facility] [address]  Phone: [phone] [facility] committed to your overall health.  To help you get the most out of each of your visits, we will review your information to make sure you are up to date on all of your recommended tests and/or procedures.       Dr. Noah Chowdhury MD has found that your chart shows you may be due for a:     MAMMOGRAM (BREAST CANCER SCREENING)   LABS: (HEMOGLOBIN A1C AND LIPID PANEL)   DIABETIC EYE EXAM   DIABETIC FOOT EXAM     If you have had any of the above done at another facility, please bring the records or information with you so that your record at [facility] will be complete.  If you would like to schedule any of these, please contact the clinic at [phone], OR USE THE SCHEDULING LINK PROVIDED IN THIS MESSAGE TO EASILY SCHEDULE YOUR DIABETIC EYE EXAM AND/OR MAMMOGRAM.       If you are currently taking medication, please bring it with you to your appointment for review.     Also, if you have any type of Advanced Directives, please bring them with you to your office visit so we may scan them into your chart.     Thank You,     Your [facility],   MD Sarah Cameron LPN Clinical Care Coordinator   [facility] [address] [place] [address]   Phone [phone]   Fax [phone]

## 2020-09-01 DIAGNOSIS — E11.65 TYPE 2 DIABETES MELLITUS WITH HYPERGLYCEMIA, WITHOUT LONG-TERM CURRENT USE OF INSULIN: ICD-10-CM

## 2020-09-01 DIAGNOSIS — E03.9 HYPOTHYROIDISM, UNSPECIFIED TYPE: Primary | ICD-10-CM

## 2020-09-01 DIAGNOSIS — I15.2 HYPERTENSION ASSOCIATED WITH DIABETES: ICD-10-CM

## 2020-09-01 DIAGNOSIS — E11.59 HYPERTENSION ASSOCIATED WITH DIABETES: ICD-10-CM

## 2020-09-02 NOTE — TELEPHONE ENCOUNTER
Encounter details (provider/department) have been updated by [facility] staff.   Of note, HF details may not display.     As of this time CDM: Does not populate or display     Updated: Department    Of note. CDM should display. medication Is delegated and encounter details have been updated    Will resend refill request encounter to P Centralized Refill Staff Pool.     [facility]     Note composed:9:42 AM 09/02/2020

## 2020-09-02 NOTE — TELEPHONE ENCOUNTER
Care Due:                  Date            Visit Type   Department     Provider  --------------------------------------------------------------------------------                                ESTABLISHED                              PATIENT      [facility]  Last Visit: 04-      [facility]       Noah Chowdhury  Next Visit: None Scheduled  None         None Found                                                            Last  Test          Frequency    Reason                     Performed    Due Date  --------------------------------------------------------------------------------    Office Visit  12 months..  furosemide...............  04- 04-    Cr..........  6 months...  furosemide...............  02- 08-    K...........  6 months...  furosemide...............  02- 08-    Na..........  6 months...  furosemide...............  02- 08-    Powered by [employer]. Reference number: [id number]. 9/02/2020 9:42:45 AM CDT

## 2020-09-03 RX ORDER — METOPROLOL SUCCINATE 25 MG/1
TABLET, EXTENDED RELEASE ORAL
Qty: 90 TABLET | Refills: 0 | Status: SHIPPED | OUTPATIENT
Start: 2020-09-03

## 2020-09-03 NOTE — PROGRESS NOTES
Refill Routing Note   Medication(s) are not appropriate for processing by [facility]:       - Patient has not been seen in over 15 months by PCP     Medication-related problems identified:   Requires labs  Requires appointment  Medication Therapy Plan: CDMR; LOV 4/19; Needs appt (ANNUAL); >15 months since LOV; NTBS (CMP, Lipid, A1c, TSH); Defer to you   Will follow up with your staff to schedule appointment and labs after your decision.   Medication reconciliation completed: No      Automatic Epic Generated Protocol Data:    Orders Placed This Encounter    Comprehensive metabolic panel    TSH      Requested Prescriptions   Pending Prescriptions Disp Refills    metoprolol succinate (TOPROL-XL) 25 MG 24 hr tablet [Pharmacy Med Name: METOPROLOL SUCCINATE ER 25 MG Tablet Extended Release 24 Hour] 90 tablet 0     Sig: TAKE 1 TABLET EVERY DAY       Cardiovascular:  Beta Blockers Failed - 9/2/2020  9:42 AM        Failed - Last BP in normal range within 360 days.     BP Readings from Last 3 Encounters:   05/15/19 (!) 176/79   05/13/19 124/71   04/23/19 128/62              Failed - Last Heart Rate in normal range within 360 days.     Pulse Readings from Last 3 Encounters:   05/15/19 64   05/13/19 67   04/23/19 75             Failed - Office visit in past 12 months or future 90 days.     Recent Outpatient Visits            1 year ago Arthritis of right knee    [facility]    1 year ago Myofascial pain    [place] [facility] Delmar Rosas MD    1 year ago Blurred vision    [facility] Norma Sam Jordan MD    1 year ago Good hypertension control    [place] [facility] Noah Chowdhury MD    1 year ago DDD (degenerative disc disease), lumbar    [place] [facility] Delmar Rosas MD                    Passed - Patient is at least 18 years old              Appointments  past 12m or future 3m with PCP    Date Provider   Last Visit    4/23/2019 Noah Chowdhury MD   Next Visit   Visit date not found Noah Chowdhury MD   ED visits in past 90 days: 0     Note composed:10:11 AM 09/03/2020

## 2020-09-05 NOTE — TELEPHONE ENCOUNTER
Provider Staff:     Please schedule patient for Annual and Labs (CMP, Lipid, A1c, TSH)    Please also check with your provider if any further labs need to be added and scheduled together.    Thanks!  [facility]     Appointments  past 12m or future 3m with PCP    Date Provider   Last Visit   4/23/2019 Noah Chowdhury MD   Next Visit   Visit date not found Noah Chowdhury MD     Note composed:8:33 PM 09/04/2020

## 2020-10-05 ENCOUNTER — PATIENT MESSAGE (OUTPATIENT)
Dept: ADMINISTRATIVE | Facility: HOSPITAL | Age: 70
End: 2020-10-05

## 2020-10-30 DIAGNOSIS — E11.59 HYPERTENSION ASSOCIATED WITH DIABETES: ICD-10-CM

## 2020-10-30 DIAGNOSIS — I15.2 HYPERTENSION ASSOCIATED WITH DIABETES: ICD-10-CM

## 2020-10-30 NOTE — TELEPHONE ENCOUNTER
Care Due:                  Date            Visit Type   Department     Provider  --------------------------------------------------------------------------------                                ESTABLISHED                              PATIENT      [facility]  Last Visit: 04-      [facility]       Noah Chowdhury  Next Visit: None Scheduled  None         None Found                                                            Last  Test          Frequency    Reason                     Performed    Due Date  --------------------------------------------------------------------------------    CMP.........  12 months..  furosemide...............  Not Found    Overdue    Powered by [employer]. Reference number: [id number]. 10/30/2020 4:19:57 PM   CDT

## 2020-11-02 NOTE — PROGRESS NOTES
Refill Routing Note   Medication(s) are not appropriate for processing by [facility] for the following reason(s):     - Patient has not been seen in over 15 months by PCP  - Drug-Disease Interaction (Hypertension associated with diabetes)    [facility] actions taken in this encounter: Defer    Will follow up with your staff to schedule appointment and labs after your decision.    Medication-related problems identified:   Non-adherence (knowledge deficit) non-intentional  Requires labs  Requires appointment  Medication Therapy Plan: UNCLEAR IF PT. FOLLOWS WITH PCP; CDMR. NEEDS APPT(ANNUAL) TO RE-ESTABLISH CARE. LABS(CMP, LIPIDS, TSH, A1C) PER WOG; DDzi(Hypertension associated with diabetes); PER EPIC DATA 0% ADHERENCE  Medication reconciliation completed: No   Automatic Epic Generated Protocol Data:        Requested Prescriptions   Pending Prescriptions Disp Refills    metoprolol succinate (TOPROL-XL) 25 MG 24 hr tablet [Pharmacy Med Name: METOPROLOL SUCCINATE ER 25 MG Tablet Extended Release 24 Hour] 90 tablet 0     Sig: TAKE 1 TABLET EVERY DAY       Cardiovascular:  Beta Blockers Failed - 11/1/2020  6:04 PM        Failed - Last BP in normal range within 360 days.     BP Readings from Last 3 Encounters:   05/15/19 (!) 176/79   05/13/19 124/71   04/23/19 128/62              Failed - Last Heart Rate in normal range within 360 days.     Pulse Readings from Last 3 Encounters:   05/15/19 64   05/13/19 67   04/23/19 75             Failed - Office visit in past 12 months or future 90 days     Recent Outpatient Visits            1 year ago [facility] right [facility]    1 year ago Myofascial pain    [place] [facility] Delmar Rosas MD    1 year ago Blurred vision    [facility] Norma Jordan MD    1 year ago Good hypertension control    [place] [facility] Noah Chowdhury MD    1 year ago DDD (degenerative disc disease),  lumbar    [place] [facility] Delmar Rosas MD                    Passed - Patient is at least 16 years old              Appointments  past 12m or future 3m with PCP    Date Provider   Last Visit   4/23/2019 Noah Chowdhury MD   Next Visit   Visit date not found Noah Chowdhury MD   ED visits in past 90 days: 0        Note composed:7:36 AM 11/02/2020

## 2020-11-02 NOTE — TELEPHONE ENCOUNTER
She has not been seen in a year and a half, last blood pressure reading was very high.  Needs office visit and needs blood pressure check with nurse visit asap

## 2020-11-05 RX ORDER — METOPROLOL SUCCINATE 25 MG/1
TABLET, EXTENDED RELEASE ORAL
Qty: 90 TABLET | Refills: 0 | OUTPATIENT
Start: 2020-11-05

## 2021-01-04 ENCOUNTER — PATIENT MESSAGE (OUTPATIENT)
Dept: ADMINISTRATIVE | Facility: HOSPITAL | Age: 71
End: 2021-01-04

## 2021-04-05 ENCOUNTER — PATIENT MESSAGE (OUTPATIENT)
Dept: ADMINISTRATIVE | Facility: HOSPITAL | Age: 71
End: 2021-04-05

## 2021-07-06 ENCOUNTER — PATIENT MESSAGE (OUTPATIENT)
Dept: ADMINISTRATIVE | Facility: HOSPITAL | Age: 71
End: 2021-07-06

## 2021-08-17 NOTE — PROGRESS NOTES
Subjective:    Patient ID:  Franca Coffey is a 67 y.o. female who presents for evaluation of Follow-up  For CORONADO and premature family history, atherosclerosis on abdominal X-ray  Self referred  PCP: Dr. Chowdhury, see annually  Endocrine: Dr. Agrawal  GI: Dr. Thomas  Fibromyalgia / Rheumatologist: Dr. Velásquez, see biannually   Lives with , Rip, here with patient, non-smoker  Retired from [employer] with negative past cardiac history but concern about premature family history with brother with MI in his 40s and mother's first MI in her 40s and  of MI at age 50. Over the past 2 months have noted increase in CORONADO, especially with lifting and carrying laundry. The BP medications, metoprolol and Avapro was stopped about 2 months ago due to low BP after weight loss 25-30 lbs, recall SBP of 107 with drowsiness. Symptom improved after the change but fast heart done 4 weeks after and was placed back on low dose metoprolol. Thyroid level also dropped during this time. The CORONADO did improve some after restart of BB. ECG shows NSR, rate 71, non-specific STT abnormalities. Chart reviewed, lipid in 10/2016, off statin, shows LDL of 150, ASCVD 10-year event risk of 22.9%. No prior cardiac testing, recall stress test 10 years ago. Was told by chiropractor that abdominal Xray shows atherosclerotic disease. Recent labs, A1C 5.8%, LDL on 20 mg of Pravastatin is 107, non-, discussed goal of treatment.    In 2017, 6 months review. Several medical issues: Chronic fatigue on awakening despite 100% CPAP use, intractable migraines 4 times weekly, on no Rx, persistent HTN with chronic pains, with NSAID use, on low dose Lisinopril. Active with gardening, 3 times weekly for 1-2 hours, no problem. Labs reviewed: LDL 55.6, goal is < 75, A1C at 5.8% in 3/2017, CRP and ESR negative, urine negative for protein.  Chart reviewed: stress Echo in 3/2017 exercised for 5.04 minutes on a High Ramp protocol,  corresponding to a functional capacity of 7 estimated METS, achieving a peak heart rate of 125 bpm, which is 86% of the age predicted maximum heart rate. The patient reported 6/10 chest   pain. The patient experienced 10/10 shortness of breath. All symptoms resolved in recovery.     There were no significant electrocardiographic changes throughout the protocol suggesting ischemia.     EKG Conclusions:    1. The EKG portion of this study is negative for ischemia at a moderate workload, and peak heart rate of 125 bpm (86% of predicted).   2. Exercise capacity is average.   3. Blood pressure response to exercise was normal (Presenting BP: 136/85 Peak BP: 167/83).   4. No significant arrhythmias were present.   5. The patient reported significant chest pain and significant dyspnea during the protocol which resolved in recovery.   6. The Duke treadmill score was 1 suggesting an intermediate probability for future cardiovascular events.    ECHO CONCLUSIONS     1 - Concentric remodeling. Wall thickness is upper limit of normal in size, with the septum and the posterior wall each measuring 1.0 cm across.     2 - Normal left ventricular systolic function (EF 60-65%).     3 - Trivial to mild aortic regurgitation.     4 - Normal left ventricular diastolic function.     5 - Normal right ventricular systolic function .     6 - The estimated PA systolic pressure is 28 mmHg.     No evidence of stress induced myocardial ischemia.       Review of Systems   Constitution: Positive for chills, decreased appetite, diaphoresis, malaise/fatigue, night sweats and weight loss (43 lbs over past 6 months, intentional). Negative for fever, weakness and weight gain.   HENT: Positive for hearing loss (left), odynophagia and tinnitus. Negative for nosebleeds.    Eyes: Positive for blurred vision, discharge, pain, visual disturbance and visual halos.   Cardiovascular: Positive for chest pain (occasional, no inciting factor), dyspnea on exertion  and palpitations. Negative for claudication, cyanosis, irregular heartbeat, leg swelling, near-syncope, orthopnea and paroxysmal nocturnal dyspnea.   Respiratory: Positive for shortness of breath, sleep disturbances due to breathing and snoring (using 100% CPAP). Negative for cough and wheezing.         [place] score 9   Endocrine: Positive for cold intolerance, heat intolerance, polydipsia and polyphagia. Negative for polyuria.   Hematologic/Lymphatic: Bruises/bleeds easily.   Skin: Positive for dry skin, itching and nail changes. Negative for color change, flushing, poor wound healing and suspicious lesions.   Musculoskeletal: Positive for arthritis, back pain, falls, joint pain, joint swelling, muscle cramps, myalgias and stiffness. Negative for gout and muscle weakness.   Gastrointestinal: Positive for bloating, abdominal pain, change in bowel habit, diarrhea, dysphagia, heartburn and nausea. Negative for hematemesis, hematochezia and melena.        IBS     Genitourinary: Positive for flank pain and pelvic pain.   Neurological: Positive for difficulty with concentration, disturbances in coordination, excessive daytime sleepiness, dizziness, focal weakness, headaches, light-headedness, loss of balance, numbness, paresthesias and vertigo.   Psychiatric/Behavioral: Positive for depression, hallucinations and memory loss. Negative for substance abuse. The patient has insomnia and is nervous/anxious.    Allergic/Immunologic: Positive for hives.        Objective:    Physical Exam   Constitutional: She is oriented to person, place, and time. She appears well-developed and well-nourished.   HENT:   Head: Normocephalic.   Eyes: Conjunctivae and EOM are normal. Pupils are equal, round, and reactive to light.   Neck: Normal range of motion. Neck supple. No JVD present. No thyromegaly present.   Cardiovascular: Normal rate, regular rhythm, normal heart sounds and intact distal pulses.  Exam reveals no gallop and no friction  "rub.    No murmur heard.  Pulmonary/Chest: Effort normal and breath sounds normal. She has no rales. She exhibits no tenderness.   Abdominal: Soft. Bowel sounds are normal. There is no tenderness.   Waist 39.5"   Musculoskeletal: Normal range of motion. She exhibits no edema.   Lymphadenopathy:     She has no cervical adenopathy.   Neurological: She is alert and oriented to person, place, and time.   Skin: Skin is warm and dry. No rash noted.         Assessment:       1. Cardiovascular event risk, ASCVD 10-year risk 22.9%, 2016    2. Hypertension, unspecified type    3. Chronic fatigue    4. Essential hypertension, onset 2007    5. Obesity, Class II, BMI 35-39.9, with comorbidity    6. Hashimoto's thyroiditis    7. Type 2 diabetes mellitus with hyperglycemia, without long-term current use of insulin    8. Hypercholesterolemia, baseline     9. Intractable migraine without aura and with status migrainosus, onset 9/2017    10. Fibromyalgia, onset  2009    11. NSAID long-term use    12. Obesity (BMI 30-39.9), today 31.2         Plan:       Franca was seen today for follow-up.    Diagnoses and all orders for this visit:    Cardiovascular event risk, ASCVD 10-year risk 22.9%, 2016    Hypertension, unspecified type  -     EKG 12-lead  -     lisinopril (PRINIVIL,ZESTRIL) 5 MG tablet; Take 1 tablet (5 mg total) by mouth once daily.    Chronic fatigue    Essential hypertension, onset 2007    Obesity, Class II, BMI 35-39.9, with comorbidity    Hashimoto's thyroiditis    Type 2 diabetes mellitus with hyperglycemia, without long-term current use of insulin  -     lisinopril (PRINIVIL,ZESTRIL) 5 MG tablet; Take 1 tablet (5 mg total) by mouth once daily.    Hypercholesterolemia, baseline     Intractable migraine without aura and with status migrainosus, onset 9/2017    Fibromyalgia, onset  2009    NSAID long-term use    Obesity (BMI 30-39.9), today 31.2    - Highly recommend review on REYES, migraine Rx  - Instruction " for Mediterranean diet and heart healthy exercise given.  - Highly recommend 30 minutes of exercise daily, can have Sunday off, with 2-3 sessions of muscle strengthening weekly. A  would be very helpful.  - Check home blood pressure, 2 days weekly, do 2 readings within 5 minutes in AM and PM, keep log for review.  - Weigh twice weekly, try to lose 1-2 lbs per week  - Recommend at least biannual cardiovascular evaluation in view of his significant risk factors. Everyone agrees.    Patient Active Problem List   Diagnosis    Abdominal pain, generalized    Depression    Anxiety    GERD (gastroesophageal reflux disease)    Fibromyalgia, onset  2009    Heartburn    Female dyspareunia    Pain in joint, lower leg    Vaginal erosion due to surgical mesh    Hypothyroidism    Good hypertension control    Migraine    Exposure of implanted vaginal mesh and prosthetic material in vagina    Lumbar spondylosis    DDD (degenerative disc disease), lumbar    Obesity, Class II, BMI 35-39.9, with comorbidity, today 31    Thyroid disease    Thyroiditis    Goiter    Abnormal thyroid blood test    Essential hypertension, onset 2007    Dysmetabolic syndrome    Osteopenia    Hashimoto's thyroiditis    Left knee pain    Pain due to total left knee replacement    Dysphagia    Postmenopausal    Type 2 diabetes mellitus with hyperglycemia, onset 2016    Hypercholesterolemia, baseline     Obesity (BMI 30-39.9), today 31.2    Family history of premature CAD    CORONADO (dyspnea on exertion), onset 1/2017    Cardiovascular event risk, ASCVD 10-year risk 22.9%, 2016    Kidney stone on right side    Vertigo    REYES on CPAP, use 100%    Abdominal obesity    Irritable bowel syndrome with both constipation and diarrhea    Primary osteoarthritis involving multiple joints    Chronic fatigue    Intractable migraine without aura and with status migrainosus, onset 9/2017    NSAID long-term use      Total face-to-face time with the patient was 45 minutes and greater than 50% was spent in counseling and coordination of care. The above assessment and plan have been discussed at length. Labs and procedure over the last 6 months reviewed. Problem List updated. Asked to bring in all active medications / pills bottles with next visit.                  no cold intolerance/no heat intolerance

## 2024-10-04 NOTE — PLAN OF CARE
Patient tolerating oral liquids without difficulty. No apparent signs and/or symptoms of distress noted at this time. No complaints voiced at this time. Discharge instructions reviewed with patient/family/friend with good verbal feedback received. Patient ready for discharge     3 = A little assistance

## 2024-10-11 ENCOUNTER — LAB VISIT (OUTPATIENT)
Dept: LAB | Facility: HOSPITAL | Age: 74
End: 2024-10-11
Payer: MEDICARE

## 2024-10-11 ENCOUNTER — OFFICE VISIT (OUTPATIENT)
Dept: FAMILY MEDICINE | Facility: CLINIC | Age: 74
End: 2024-10-11
Payer: MEDICARE

## 2024-10-11 ENCOUNTER — TELEPHONE (OUTPATIENT)
Dept: FAMILY MEDICINE | Facility: CLINIC | Age: 74
End: 2024-10-11

## 2024-10-11 VITALS
HEIGHT: 62 IN | WEIGHT: 175.69 LBS | BODY MASS INDEX: 32.33 KG/M2 | HEART RATE: 78 BPM | SYSTOLIC BLOOD PRESSURE: 136 MMHG | DIASTOLIC BLOOD PRESSURE: 82 MMHG | OXYGEN SATURATION: 97 %

## 2024-10-11 DIAGNOSIS — E11.59 HYPERTENSION ASSOCIATED WITH DIABETES: ICD-10-CM

## 2024-10-11 DIAGNOSIS — Z91.81 AT HIGH RISK FOR FALLS: Primary | ICD-10-CM

## 2024-10-11 DIAGNOSIS — I15.2 HYPERTENSION ASSOCIATED WITH DIABETES: ICD-10-CM

## 2024-10-11 DIAGNOSIS — Z12.39 ENCOUNTER FOR SCREENING FOR MALIGNANT NEOPLASM OF BREAST, UNSPECIFIED SCREENING MODALITY: ICD-10-CM

## 2024-10-11 DIAGNOSIS — E11.42 TYPE 2 DIABETES MELLITUS WITH DIABETIC POLYNEUROPATHY, WITHOUT LONG-TERM CURRENT USE OF INSULIN: ICD-10-CM

## 2024-10-11 DIAGNOSIS — E78.00 HYPERCHOLESTEROLEMIA: ICD-10-CM

## 2024-10-11 DIAGNOSIS — E03.9 ACQUIRED HYPOTHYROIDISM: ICD-10-CM

## 2024-10-11 DIAGNOSIS — R42 DIZZINESS: ICD-10-CM

## 2024-10-11 DIAGNOSIS — Z98.890 HISTORY OF BREAST LUMP/MASS EXCISION: ICD-10-CM

## 2024-10-11 DIAGNOSIS — J45.909 ASTHMA, UNSPECIFIED ASTHMA SEVERITY, UNSPECIFIED WHETHER COMPLICATED, UNSPECIFIED WHETHER PERSISTENT: ICD-10-CM

## 2024-10-11 DIAGNOSIS — I10 ESSENTIAL HYPERTENSION: ICD-10-CM

## 2024-10-11 DIAGNOSIS — G25.81 RESTLESS LEG SYNDROME: ICD-10-CM

## 2024-10-11 DIAGNOSIS — Z80.3 FAMILY HISTORY OF BREAST CANCER: ICD-10-CM

## 2024-10-11 DIAGNOSIS — J32.0 CHRONIC MAXILLARY SINUSITIS: ICD-10-CM

## 2024-10-11 DIAGNOSIS — R12 HEARTBURN: ICD-10-CM

## 2024-10-11 DIAGNOSIS — M19.90 ARTHRITIS: ICD-10-CM

## 2024-10-11 DIAGNOSIS — M79.672 LEFT FOOT PAIN: ICD-10-CM

## 2024-10-11 LAB
ALBUMIN SERPL BCP-MCNC: 3.9 G/DL (ref 3.5–5.2)
ALP SERPL-CCNC: 87 U/L (ref 55–135)
ALT SERPL W/O P-5'-P-CCNC: 15 U/L (ref 10–44)
ANION GAP SERPL CALC-SCNC: 11 MMOL/L (ref 8–16)
AST SERPL-CCNC: 16 U/L (ref 10–40)
BASOPHILS # BLD AUTO: 0.04 K/UL (ref 0–0.2)
BASOPHILS NFR BLD: 0.7 % (ref 0–1.9)
BILIRUB SERPL-MCNC: 0.4 MG/DL (ref 0.1–1)
BUN SERPL-MCNC: 15 MG/DL (ref 8–23)
CALCIUM SERPL-MCNC: 9.8 MG/DL (ref 8.7–10.5)
CHLORIDE SERPL-SCNC: 108 MMOL/L (ref 95–110)
CHOLEST SERPL-MCNC: 247 MG/DL (ref 120–199)
CHOLEST/HDLC SERPL: 4.8 {RATIO} (ref 2–5)
CO2 SERPL-SCNC: 23 MMOL/L (ref 23–29)
CREAT SERPL-MCNC: 1.2 MG/DL (ref 0.5–1.4)
DIFFERENTIAL METHOD BLD: NORMAL
EOSINOPHIL # BLD AUTO: 0.2 K/UL (ref 0–0.5)
EOSINOPHIL NFR BLD: 3.6 % (ref 0–8)
ERYTHROCYTE [DISTWIDTH] IN BLOOD BY AUTOMATED COUNT: 13.2 % (ref 11.5–14.5)
EST. GFR  (NO RACE VARIABLE): 46.9 ML/MIN/1.73 M^2
ESTIMATED AVG GLUCOSE: 120 MG/DL (ref 68–131)
GLUCOSE SERPL-MCNC: 83 MG/DL (ref 70–110)
HBA1C MFR BLD: 5.8 % (ref 4–5.6)
HCT VFR BLD AUTO: 39.6 % (ref 37–48.5)
HDLC SERPL-MCNC: 52 MG/DL (ref 40–75)
HDLC SERPL: 21.1 % (ref 20–50)
HGB BLD-MCNC: 12.7 G/DL (ref 12–16)
IMM GRANULOCYTES # BLD AUTO: 0.01 K/UL (ref 0–0.04)
IMM GRANULOCYTES NFR BLD AUTO: 0.2 % (ref 0–0.5)
LDLC SERPL CALC-MCNC: 156.8 MG/DL (ref 63–159)
LYMPHOCYTES # BLD AUTO: 1.7 K/UL (ref 1–4.8)
LYMPHOCYTES NFR BLD: 30.7 % (ref 18–48)
MCH RBC QN AUTO: 29.8 PG (ref 27–31)
MCHC RBC AUTO-ENTMCNC: 32.1 G/DL (ref 32–36)
MCV RBC AUTO: 93 FL (ref 82–98)
MONOCYTES # BLD AUTO: 0.4 K/UL (ref 0.3–1)
MONOCYTES NFR BLD: 6.3 % (ref 4–15)
NEUTROPHILS # BLD AUTO: 3.2 K/UL (ref 1.8–7.7)
NEUTROPHILS NFR BLD: 58.5 % (ref 38–73)
NONHDLC SERPL-MCNC: 195 MG/DL
NRBC BLD-RTO: 0 /100 WBC
PLATELET # BLD AUTO: 213 K/UL (ref 150–450)
PMV BLD AUTO: 10.8 FL (ref 9.2–12.9)
POTASSIUM SERPL-SCNC: 4.4 MMOL/L (ref 3.5–5.1)
PROT SERPL-MCNC: 7 G/DL (ref 6–8.4)
RBC # BLD AUTO: 4.26 M/UL (ref 4–5.4)
SODIUM SERPL-SCNC: 142 MMOL/L (ref 136–145)
TRIGL SERPL-MCNC: 191 MG/DL (ref 30–150)
TSH SERPL DL<=0.005 MIU/L-ACNC: 1.48 UIU/ML (ref 0.4–4)
WBC # BLD AUTO: 5.54 K/UL (ref 3.9–12.7)

## 2024-10-11 PROCEDURE — 83036 HEMOGLOBIN GLYCOSYLATED A1C: CPT

## 2024-10-11 PROCEDURE — 36415 COLL VENOUS BLD VENIPUNCTURE: CPT | Mod: PO

## 2024-10-11 PROCEDURE — 85025 COMPLETE CBC W/AUTO DIFF WBC: CPT

## 2024-10-11 PROCEDURE — 84443 ASSAY THYROID STIM HORMONE: CPT

## 2024-10-11 PROCEDURE — 80053 COMPREHEN METABOLIC PANEL: CPT

## 2024-10-11 PROCEDURE — 99999 PR PBB SHADOW E&M-EST. PATIENT-LVL IV: CPT | Mod: PBBFAC,,,

## 2024-10-11 PROCEDURE — 80061 LIPID PANEL: CPT

## 2024-10-11 RX ORDER — LEVOTHYROXINE SODIUM 75 UG/1
TABLET ORAL
Qty: 90 TABLET | Refills: 3 | Status: SHIPPED | OUTPATIENT
Start: 2024-10-11

## 2024-10-11 RX ORDER — GABAPENTIN 100 MG/1
100 CAPSULE ORAL 2 TIMES DAILY
Qty: 180 CAPSULE | Refills: 3 | Status: SHIPPED | OUTPATIENT
Start: 2024-10-11

## 2024-10-11 RX ORDER — ALBUTEROL SULFATE 90 UG/1
2 INHALANT RESPIRATORY (INHALATION) EVERY 6 HOURS PRN
Qty: 6.7 G | Refills: 11 | Status: SHIPPED | OUTPATIENT
Start: 2024-10-11

## 2024-10-11 RX ORDER — ESOMEPRAZOLE MAGNESIUM 40 MG/1
40 CAPSULE, DELAYED RELEASE ORAL DAILY
Qty: 90 CAPSULE | Refills: 2 | Status: SHIPPED | OUTPATIENT
Start: 2024-10-11

## 2024-10-11 RX ORDER — ROPINIROLE 2 MG/1
2 TABLET, FILM COATED ORAL NIGHTLY
Qty: 90 TABLET | Refills: 2 | Status: SHIPPED | OUTPATIENT
Start: 2024-10-11

## 2024-10-11 RX ORDER — LISINOPRIL 20 MG/1
40 TABLET ORAL DAILY
Qty: 180 TABLET | Refills: 3 | Status: SHIPPED | OUTPATIENT
Start: 2024-10-11 | End: 2025-10-11

## 2024-10-11 RX ORDER — ALBUTEROL SULFATE 90 UG/1
2 INHALANT RESPIRATORY (INHALATION) EVERY 6 HOURS PRN
COMMUNITY
End: 2024-10-11 | Stop reason: SDUPTHER

## 2024-10-11 RX ORDER — ROSUVASTATIN CALCIUM 10 MG/1
10 TABLET, COATED ORAL DAILY
Qty: 90 TABLET | Refills: 3 | Status: SHIPPED | OUTPATIENT
Start: 2024-10-11 | End: 2025-10-11

## 2024-10-11 RX ORDER — ROPINIROLE 2 MG/1
2 TABLET, FILM COATED ORAL NIGHTLY
COMMUNITY
End: 2024-10-11 | Stop reason: SDUPTHER

## 2024-10-11 RX ORDER — METOPROLOL SUCCINATE 25 MG/1
25 TABLET, EXTENDED RELEASE ORAL DAILY
Qty: 90 TABLET | Refills: 3 | Status: SHIPPED | OUTPATIENT
Start: 2024-10-11

## 2024-10-11 RX ORDER — DICLOFENAC SODIUM 10 MG/G
2 GEL TOPICAL DAILY
Qty: 200 G | Refills: 11 | Status: SHIPPED | OUTPATIENT
Start: 2024-10-11

## 2024-10-11 NOTE — PROGRESS NOTES
Patient ID: Franca Coffey is a 74 y.o. female.    Chief Complaint: Establish Care    History of Present Illness    CHIEF COMPLAINT:  Franca presents today to establish care.    CURRENT SYMPTOMS:  She reports ongoing headache attributed to lumps in the back of her head. She has experienced a sinus infection with associated sinus tenderness and postnasal drip for approximately one week. She complains of back pain, particularly when not standing straight or bending over. She describes sharp abdominal pain in the lower right quadrant present for about a month. She reports daily fever at 4:30 PM. She mentions scalp lesions with generalized itching.    ALLERGIES:  She reports allergies to sulfa (hives, itching, and rash), penicillin, adhesive, garlic, and morphine.    MEDICATIONS:  Current medications include Albuterol, Celebrex, Wellbutrin, Zyrtec, Librax, Cymbalta 60mg, Esomeprazole (Nexium), Gabapentin 100mg twice daily, Levothyroxine 75mg, Lisinopril 40mg daily, multivitamin, Ropinirole (Requip) 2mg nightly, and Topamax (topiramate). She reports stomach cramps with Atorvastatin. She ran out of Lasix after moving and lost her Metoprolol. She needs refills for Albuterol, Esomeprazole, Gabapentin, Levothyroxine, and Ropinirole.    MEDICAL HISTORY:  Her history includes vision abnormalities, sleep apnea (uses CPAP), osteopenia, osteoarthritis, muscle spasms in head and neck, migraines, Meniere's disease with associated dizziness, kidney stones, irritable bowel syndrome (mixed constipation and diarrhea), hypothyroidism, hypertension, recurrent bladder infections, acid reflux, fibromyalgia, diet-controlled diabetes, depression (controlled with medication), cataracts, and anxiety. She reports occasional difficulty with urination, sometimes having to wait to void, and frequent dizziness.    SURGICAL HISTORY:  Past surgeries include tonsillectomy, bladder mesh revision, radioablation of nerve in back, knee  arthroscopy, breast biopsy, and foot surgery within the past year. She reports ongoing numbness and pain in the left foot following the recent foot surgery, with difficulty walking in shoes.    FAMILY HISTORY:  Both parents have a history of heart disease, diabetes, kidney issues, hypertension, and hypercholesterolemia. Her father has COPD. Her  brother had heart disease and stomach cancer. A maternal aunt has a history of breast cancer.    SOCIAL HISTORY:  She recently moved and is experiencing financial hardship but declines referral to social work services due to her partner's pride. She denies smoking cigarettes or consuming alcohol.    PREVENTIVE CARE:  She reports having undergone a colonoscopy within the past year.      ROS:  General: reports fever  ENT: reports sinus pressure, reports post nasal drip  Gastrointestinal: reports abdominal pain  Musculoskeletal: reports back pain  Skin: reports rash, reports itching  Neurological: reports headache, reports dizziness, reports numbness         Vitals:    10/11/24 1323   BP: 136/82   Pulse: 78      Body mass index is 32.14 kg/m².     Physical Exam    Vitals: Blood pressure: 136/82. Pulse: 78.  General: No acute distress. Well-developed. Well-nourished.  Eyes: EOMI. Sclerae anicteric.  HENT: Normocephalic. Atraumatic. Nares patent. Moist oral mucosa.  Cardiovascular: Regular rate. Regular rhythm. No murmurs. No rubs. No gallops. Normal S1, S2.  Respiratory: Normal respiratory effort. Clear to auscultation bilaterally. No rales. No rhonchi. No wheezing.  Abdomen: Soft. Non-tender. Non-distended. Normoactive bowel sounds.  Musculoskeletal: No  obvious deformity.  Extremities: No lower extremity edema.  Neurological: Alert & oriented x3. No slurred speech. Normal gait.  Psychiatric: Normal mood. Normal affect. Good insight. Good judgment.  Skin: Warm. Dry. No rash. Scab on top back of head is well healed, no lumps or bumps appreciated.   Neck: All normal.           Assessment & Plan    Assessed medication regimen and made adjustments to optimize management of chronic conditions  Evaluated reported headaches and dizziness, considering potential sinus infection as a contributing factor  Considered fall risk due to foot surgery complications and subsequent sensory issues  Assessed abdominal pain, considering possibility of diverticulitis  Evaluated healing scalp lesions, determining no immediate intervention needed    KIDNEY STONES:  - Discussed importance of staying hydrated to prevent kidney stones.    SCALP LESIONS:  - Explained healing process of scalp lesions and advised against scratching to prevent infection.    DIVERTICULITIS:  - Educated on signs and symptoms of diverticulitis to monitor for.    BACK PAIN:  - Recommend performing simple stretches for back pain management.    HYPERLIPIDEMIA:  - Started rosuvastatin 10 mg daily to replace atorvastatin due to side effects.    FOOT ARTHRITIS:  - Started diclofenac (Voltaren) gel, apply 2 g to left foot 4 times daily for arthritic pain.    SINUS SYMPTOMS:  - Started ipratropium nasal spray for sinus symptoms.    HYPERTENSION:  - Restarted metoprolol at previous dose.    ASTHMA:  - Refilled albuterol inhaler, to be sent to [facility] pharmacy.    GASTROESOPHAGEAL REFLUX DISEASE (GERD):  - Refilled esomeprazole (Nexium).    NEUROPATHIC PAIN:  - Refilled gabapentin 100 mg twice daily, 90-day supply.    THYROID DISORDER:  - Refilled levothyroxine 75 mcg daily.    RESTLESS LEG SYNDROME:  - Refilled ropinirole (Requip) 2 mg nightly.    MIGRAINE:  - Refilled topiramate (Topamax).    OSTEOPOROSIS PREVENTION:  - Recommend OTC calcium and vitamin D supplements.    DIABETES MANAGEMENT:  - Comprehensive lab work including A1C ordered.    CHRONIC DIZZINESS AND SINUS ISSUES:  - Referred to ENT for evaluation of chronic dizziness and sinus problems.    CARDIOLOGY REFERRAL:  - Referred to cardiology for routine follow-up, patient to  schedule within the next year.    PODIATRY REFERRAL:  - Referred to podiatry for evaluation and management of foot issues related to previous surgery.    BREAST HEALTH:  - Follow up in 1 month or sooner if desired for breast exam.    ABDOMINAL PAIN:  - Contact office if abdominal pain worsens, or if fever, chills, or general malaise develop.    URINARY TRACT INFECTION PREVENTION:  - Message provider if symptoms of urinary tract infection occur.    FOLLOW UP:  - Franca to walk for exercise as tolerated.  - Follow up in 6 months for routine care.         At high risk for falls  -     Ambulatory referral/consult to Podiatry; Future; Expected date: 10/18/2024    Heartburn  -     esomeprazole (NEXIUM) 40 MG capsule; Take 1 capsule (40 mg total) by mouth once daily.  Dispense: 90 capsule; Refill: 2    Type 2 diabetes mellitus with diabetic polyneuropathy, without long-term current use of insulin  -     gabapentin (NEURONTIN) 100 MG capsule; Take 1 capsule (100 mg total) by mouth 2 (two) times daily.  Dispense: 180 capsule; Refill: 3  -     Comprehensive Metabolic Panel; Future; Expected date: 10/11/2024  -     Hemoglobin A1C; Future; Expected date: 10/11/2024  -     Lipid Panel; Future; Expected date: 10/11/2024  -     Ambulatory referral/consult to Podiatry; Future; Expected date: 10/18/2024    Acquired hypothyroidism  -     levothyroxine (SYNTHROID) 75 MCG tablet; TAKE 1 TABLET DAILY BEFORE BREAKFAST  Dispense: 90 tablet; Refill: 3    Essential hypertension  -     lisinopriL (PRINIVIL,ZESTRIL) 20 MG tablet; Take 2 tablets (40 mg total) by mouth once daily.  Dispense: 180 tablet; Refill: 3  -     Comprehensive Metabolic Panel; Future; Expected date: 10/11/2024    Hypertension associated with diabetes  -     metoprolol succinate (TOPROL-XL) 25 MG 24 hr tablet; Take 1 tablet (25 mg total) by mouth once daily.  Dispense: 90 tablet; Refill: 3  -     Comprehensive Metabolic Panel; Future; Expected date: 10/11/2024  -     TSH;  Future; Expected date: 10/11/2024  -     Lipid Panel; Future; Expected date: 10/11/2024  -     CBC Auto Differential; Future; Expected date: 10/11/2024    Asthma, unspecified asthma severity, unspecified whether complicated, unspecified whether persistent  -     albuterol (PROAIR HFA) 90 mcg/actuation inhaler; Inhale 2 puffs into the lungs every 6 (six) hours as needed for Wheezing. Rescue  Dispense: 6.7 g; Refill: 11    Hypercholesterolemia  -     rosuvastatin (CRESTOR) 10 MG tablet; Take 1 tablet (10 mg total) by mouth once daily.  Dispense: 90 tablet; Refill: 3    Restless leg syndrome  -     rOPINIRole (REQUIP) 2 MG tablet; Take 1 tablet (2 mg total) by mouth every evening.  Dispense: 90 tablet; Refill: 2    Chronic maxillary sinusitis  -     Ambulatory referral/consult to ENT; Future; Expected date: 10/18/2024    Dizziness  -     Ambulatory referral/consult to ENT; Future; Expected date: 10/18/2024    Encounter for screening for malignant neoplasm of breast, unspecified screening modality  -     Mammo Digital Screening Bilat w/ Andrei; Future; Expected date: 10/11/2024    Arthritis  -     diclofenac sodium (VOLTAREN ARTHRITIS PAIN) 1 % Gel; Apply 2 g topically once daily.  Dispense: 200 g; Refill: 11    Left foot pain  -     Ambulatory referral/consult to Podiatry; Future; Expected date: 10/18/2024    History of breast lump/mass excision  -     Mammo Digital Screening Bilat w/ Andrei; Future; Expected date: 10/11/2024    Family history of breast cancer  -     Mammo Digital Screening Bilat w/ Andrei; Future; Expected date: 10/11/2024        Follow up in about 3 months (around 1/11/2025), or if symptoms worsen or fail to improve, for breast exam, general .    This note was generated with the assistance of ambient listening technology. Verbal consent was obtained by the patient and accompanying visitor(s) for the recording of patient appointment to facilitate this note. I attest to having reviewed and edited the  generated note for accuracy, though some syntax or spelling errors may persist. Please contact the author of this note for any clarification.

## 2024-10-14 DIAGNOSIS — N18.31 STAGE 3A CHRONIC KIDNEY DISEASE: Primary | ICD-10-CM

## 2024-10-14 DIAGNOSIS — R73.03 PREDIABETES: ICD-10-CM

## 2024-10-15 ENCOUNTER — PATIENT MESSAGE (OUTPATIENT)
Dept: FAMILY MEDICINE | Facility: CLINIC | Age: 74
End: 2024-10-15
Payer: MEDICARE

## 2024-10-15 DIAGNOSIS — N39.0 URINARY TRACT INFECTION WITHOUT HEMATURIA, SITE UNSPECIFIED: Primary | ICD-10-CM

## 2024-10-16 ENCOUNTER — HOSPITAL ENCOUNTER (EMERGENCY)
Facility: HOSPITAL | Age: 74
Discharge: HOME | End: 2024-10-16
Attending: EMERGENCY MEDICINE
Payer: MEDICARE

## 2024-10-16 ENCOUNTER — TELEPHONE (OUTPATIENT)
Dept: FAMILY MEDICINE | Facility: CLINIC | Age: 74
End: 2024-10-16
Payer: MEDICARE

## 2024-10-16 ENCOUNTER — NURSE TRIAGE (OUTPATIENT)
Dept: ADMINISTRATIVE | Facility: CLINIC | Age: 74
End: 2024-10-16
Payer: MEDICARE

## 2024-10-16 VITALS
DIASTOLIC BLOOD PRESSURE: 62 MMHG | HEART RATE: 69 BPM | HEIGHT: 62 IN | WEIGHT: 175 LBS | RESPIRATION RATE: 19 BRPM | TEMPERATURE: 98 F | BODY MASS INDEX: 32.2 KG/M2 | OXYGEN SATURATION: 97 % | SYSTOLIC BLOOD PRESSURE: 145 MMHG

## 2024-10-16 DIAGNOSIS — N30.01 ACUTE CYSTITIS WITH HEMATURIA: Primary | ICD-10-CM

## 2024-10-16 LAB
ALBUMIN SERPL BCP-MCNC: 3.7 G/DL (ref 3.5–5.2)
ALP SERPL-CCNC: 80 U/L (ref 55–135)
ALT SERPL W/O P-5'-P-CCNC: 10 U/L (ref 10–44)
ANION GAP SERPL CALC-SCNC: 10 MMOL/L (ref 8–16)
AST SERPL-CCNC: 14 U/L (ref 10–40)
BACTERIA #/AREA URNS HPF: ABNORMAL /HPF
BASOPHILS # BLD AUTO: 0.04 K/UL (ref 0–0.2)
BASOPHILS NFR BLD: 0.5 % (ref 0–1.9)
BILIRUB SERPL-MCNC: 0.4 MG/DL (ref 0.1–1)
BILIRUB UR QL STRIP: NEGATIVE
BUN SERPL-MCNC: 13 MG/DL (ref 8–23)
CALCIUM SERPL-MCNC: 9.4 MG/DL (ref 8.7–10.5)
CHLORIDE SERPL-SCNC: 106 MMOL/L (ref 95–110)
CLARITY UR: ABNORMAL
CO2 SERPL-SCNC: 24 MMOL/L (ref 23–29)
COLOR UR: YELLOW
CREAT SERPL-MCNC: 1 MG/DL (ref 0.5–1.4)
DIFFERENTIAL METHOD BLD: NORMAL
EOSINOPHIL # BLD AUTO: 0.2 K/UL (ref 0–0.5)
EOSINOPHIL NFR BLD: 2.1 % (ref 0–8)
ERYTHROCYTE [DISTWIDTH] IN BLOOD BY AUTOMATED COUNT: 13.2 % (ref 11.5–14.5)
EST. GFR  (NO RACE VARIABLE): 58 ML/MIN/1.73 M^2
GLUCOSE SERPL-MCNC: 84 MG/DL (ref 70–110)
GLUCOSE UR QL STRIP: NEGATIVE
HCT VFR BLD AUTO: 37.5 % (ref 37–48.5)
HCV AB SERPL QL IA: NEGATIVE
HGB BLD-MCNC: 12.1 G/DL (ref 12–16)
HGB UR QL STRIP: ABNORMAL
HIV 1+2 AB+HIV1 P24 AG SERPL QL IA: NEGATIVE
HYALINE CASTS #/AREA URNS LPF: 5 /LPF
IMM GRANULOCYTES # BLD AUTO: 0.02 K/UL (ref 0–0.04)
IMM GRANULOCYTES NFR BLD AUTO: 0.3 % (ref 0–0.5)
KETONES UR QL STRIP: NEGATIVE
LEUKOCYTE ESTERASE UR QL STRIP: ABNORMAL
LIPASE SERPL-CCNC: 13 U/L (ref 4–60)
LYMPHOCYTES # BLD AUTO: 1.8 K/UL (ref 1–4.8)
LYMPHOCYTES NFR BLD: 24 % (ref 18–48)
MCH RBC QN AUTO: 30.2 PG (ref 27–31)
MCHC RBC AUTO-ENTMCNC: 32.3 G/DL (ref 32–36)
MCV RBC AUTO: 94 FL (ref 82–98)
MICROSCOPIC COMMENT: ABNORMAL
MONOCYTES # BLD AUTO: 0.4 K/UL (ref 0.3–1)
MONOCYTES NFR BLD: 5.6 % (ref 4–15)
NEUTROPHILS # BLD AUTO: 4.9 K/UL (ref 1.8–7.7)
NEUTROPHILS NFR BLD: 67.5 % (ref 38–73)
NITRITE UR QL STRIP: NEGATIVE
NON-SQ EPI CELLS #/AREA URNS HPF: 2 /HPF
NRBC BLD-RTO: 0 /100 WBC
PH UR STRIP: 6 [PH] (ref 5–8)
PLATELET # BLD AUTO: 193 K/UL (ref 150–450)
PMV BLD AUTO: 10.1 FL (ref 9.2–12.9)
POTASSIUM SERPL-SCNC: 3.6 MMOL/L (ref 3.5–5.1)
PROT SERPL-MCNC: 6.8 G/DL (ref 6–8.4)
PROT UR QL STRIP: ABNORMAL
RBC # BLD AUTO: 4.01 M/UL (ref 4–5.4)
RBC #/AREA URNS HPF: 50 /HPF (ref 0–4)
SODIUM SERPL-SCNC: 140 MMOL/L (ref 136–145)
SP GR UR STRIP: 1.02 (ref 1–1.03)
SQUAMOUS #/AREA URNS HPF: 1 /HPF
URN SPEC COLLECT METH UR: ABNORMAL
UROBILINOGEN UR STRIP-ACNC: NEGATIVE EU/DL
WBC # BLD AUTO: 7.29 K/UL (ref 3.9–12.7)
WBC #/AREA URNS HPF: >100 /HPF (ref 0–5)

## 2024-10-16 PROCEDURE — 87389 HIV-1 AG W/HIV-1&-2 AB AG IA: CPT | Performed by: EMERGENCY MEDICINE

## 2024-10-16 PROCEDURE — 87186 SC STD MICRODIL/AGAR DIL: CPT | Performed by: PHYSICIAN ASSISTANT

## 2024-10-16 PROCEDURE — 63600175 PHARM REV CODE 636 W HCPCS: Performed by: EMERGENCY MEDICINE

## 2024-10-16 PROCEDURE — 99285 EMERGENCY DEPT VISIT HI MDM: CPT | Mod: 25

## 2024-10-16 PROCEDURE — 96372 THER/PROPH/DIAG INJ SC/IM: CPT | Performed by: EMERGENCY MEDICINE

## 2024-10-16 PROCEDURE — 81000 URINALYSIS NONAUTO W/SCOPE: CPT | Performed by: PHYSICIAN ASSISTANT

## 2024-10-16 PROCEDURE — 80053 COMPREHEN METABOLIC PANEL: CPT | Performed by: PHYSICIAN ASSISTANT

## 2024-10-16 PROCEDURE — 85025 COMPLETE CBC W/AUTO DIFF WBC: CPT | Performed by: PHYSICIAN ASSISTANT

## 2024-10-16 PROCEDURE — 36415 COLL VENOUS BLD VENIPUNCTURE: CPT | Performed by: EMERGENCY MEDICINE

## 2024-10-16 PROCEDURE — 86803 HEPATITIS C AB TEST: CPT | Performed by: EMERGENCY MEDICINE

## 2024-10-16 PROCEDURE — 83690 ASSAY OF LIPASE: CPT | Performed by: PHYSICIAN ASSISTANT

## 2024-10-16 PROCEDURE — 87086 URINE CULTURE/COLONY COUNT: CPT | Performed by: PHYSICIAN ASSISTANT

## 2024-10-16 RX ORDER — CIPROFLOXACIN 500 MG/1
500 TABLET ORAL 2 TIMES DAILY
Qty: 20 TABLET | Refills: 0 | Status: SHIPPED | OUTPATIENT
Start: 2024-10-16 | End: 2024-10-26

## 2024-10-16 RX ORDER — CEFTRIAXONE 1 G/1
1 INJECTION, POWDER, FOR SOLUTION INTRAMUSCULAR; INTRAVENOUS
Status: COMPLETED | OUTPATIENT
Start: 2024-10-16 | End: 2024-10-16

## 2024-10-16 RX ADMIN — CEFTRIAXONE SODIUM 1 G: 1 INJECTION, POWDER, FOR SOLUTION INTRAMUSCULAR; INTRAVENOUS at 07:10

## 2024-10-16 NOTE — FIRST PROVIDER EVALUATION
Emergency Department TeleTriage Encounter Note      CHIEF COMPLAINT    Chief Complaint   Patient presents with    Dysuria     Dysuria, abdominal pain pain in upper right side and lower left side        VITAL SIGNS   Initial Vitals [10/16/24 1525]   BP Pulse Resp Temp SpO2   (!) 178/81 75 19 98.2 °F (36.8 °C) 98 %      MAP       --            ALLERGIES    Review of patient's allergies indicates:   Allergen Reactions    Sulfa (sulfonamide antibiotics) Hives, Itching and Rash    Penicillins      Other reaction(s): Unknown. Childhood reaction. Pt does not remember reaction.    Adhesive Itching and Rash     Paper tape OK    Garlic Diarrhea    Morphine Nausea And Vomiting       PROVIDER TRIAGE NOTE  Patient presents with right upper and left lower quadrant abdominal pain and dysuria. No fever or chills. No nausea or vomiting.       ORDERS  Labs Reviewed   HIV 1 / 2 ANTIBODY   HEPATITIS C ANTIBODY       ED Orders (720h ago, onward)      Start Ordered     Status Ordering Provider    10/16/24 1527 10/16/24 1526  HIV 1/2 Ag/Ab (4th Gen)  STAT         Pending Collection NIKKY SZYMANSKI    10/16/24 1527 10/16/24 1526  Hepatitis C Antibody  STAT         Pending Collection NIKKY SZYMANSKI              Virtual Visit Note: The provider triage portion of this emergency department evaluation and documentation was performed via [employer], a HIPAA-compliant telemedicine application, in concert with a tele-presenter in the room. A face to face patient evaluation with one of my colleagues will occur once the patient is placed in an emergency department room.      DISCLAIMER: This note was prepared with [employer] voice recognition transcription software. Garbled syntax, mangled pronouns, and other bizarre constructions may be attributed to that software system.

## 2024-10-16 NOTE — ED PROVIDER NOTES
Encounter Date: 10/16/2024       History     Chief Complaint   Patient presents with    Dysuria     Dysuria, abdominal pain pain in upper right side and lower left side      HPI 74-year-old woman with a history of kidney stones presents emergency department complaining hematuria, dysuria, left lower abdominal pain and right flank pain that gradually began 3 days ago.  Patient felt that she passed a kidney stone but her pain did not go away like it normally does.  Endorses subjective fever/chills.  Review of patient's allergies indicates:   Allergen Reactions    Sulfa (sulfonamide antibiotics) Hives, Itching and Rash    Penicillins      Other reaction(s): Unknown. Childhood reaction. Pt does not remember reaction.    Adhesive Itching and Rash     Paper tape OK    Garlic Diarrhea    Morphine Nausea And Vomiting     Past Medical History:   Diagnosis Date    Anxiety     Cataract     Depression     Diabetes mellitus     managing with diet    Dizzy     Fibromyalgia     GERD (gastroesophageal reflux disease)     History of bladder infections     Hypertension     Hypothyroid     IBS (irritable bowel syndrome)     Kidney stones     Meniere disease     Migraine     Muscle spasms of head and/or neck     and lower ext    Osteoarthritis     Osteopenia     PONV (postoperative nausea and vomiting)     severe-cause by morphine per pt    Sleep apnea     CPAP    Vision disorder      Past Surgical History:   Procedure Laterality Date    BLADDER SUSPENSION      BREAST BIOPSY      COLONOSCOPY  8/27/14    Dr. Thomas, 5 year recheck    dental implant      upper RT implant    EXCISION OF MEDIAL MENISCUS OF KNEE Right 9/18/2018    Procedure: MENISCECTOMY, KNEE, MEDIAL;  Surgeon: Pradeep Cuellar MD;  Location: [facility];  Service: Orthopedics;  Laterality: Right;  medial and lateral    EYE SURGERY      bilateral PHACO with IOL    FOOT SURGERY Bilateral 12/05/2008    bunionectomy    FRACTURE SURGERY      HYSTERECTOMY      JOINT  REPLACEMENT Left     Partial knee    JOINT REPLACEMENT Left     Total knee replacement    KNEE ARTHROSCOPY Bilateral     KNEE ARTHROSCOPY W/ MENISCECTOMY Right 9/18/2018    Procedure: ARTHROSCOPY, KNEE, WITH MENISCECTOMY;  Surgeon: Pradeep Cuellar MD;  Location: [facility] OR;  Service: Orthopedics;  Laterality: Right;    MANDIBLE FRACTURE SURGERY      MULTIPLE TOOTH EXTRACTIONS      RADIOFREQUENCY ABLATION OF LUMBAR MEDIAL BRANCH NERVE AT SINGLE LEVEL Bilateral 4/1/2019    Procedure: Radiofrequency Ablation, Nerve, Spinal, Lumbar, Medial Branch, L3,4,5;  Surgeon: Delmar Rosas MD;  Location: [facility] beds at the [facility] OR;  Service: Pain Management;  Laterality: Bilateral;  L3,4,5 - Burned at 80 degrees C. for 75 seconds x 2 each site    revision of mesh      repair to bladder suspension    TONSILLECTOMY, ADENOIDECTOMY      UPPER GASTROINTESTINAL ENDOSCOPY      VAGINAL DELIVERY      times 2     Family History   Problem Relation Name Age of Onset    Diabetes Mother      Heart disease Mother      Kidney disease Mother      Hypertension Mother      Hyperlipidemia Mother      Diabetes Mellitus Mother      Heart disease Father      Diabetes Father      Hypertension Father      Hyperlipidemia Father      Diabetes Mellitus Father      COPD Father      Heart disease Brother      Cancer Brother      Early death Brother      Stomach cancer Brother      Breast cancer Maternal Aunt mother's identical twin     Heart disease Brother      Ovarian cancer Neg Hx      Cataracts Neg Hx      Glaucoma Neg Hx      Macular degeneration Neg Hx      Retinal detachment Neg Hx      Thyroid disease Neg Hx      Stroke Neg Hx      Rheum arthritis Neg Hx      Psoriasis Neg Hx      Osteoarthritis Neg Hx      Lupus Neg Hx      Inflammatory bowel disease Neg Hx      Depression Neg Hx      Chronic back pain Neg Hx      Asthma Neg Hx       Social History     Tobacco Use    Smoking status: Former     Current packs/day: 0.00     Average packs/day: 0.3 packs/day for 1 year  (0.3 ttl pk-yrs)     Types: Cigarettes     Start date: 10/15/1965     Quit date: 10/15/1966     Years since quittin.0    Smokeless tobacco: Never   Substance Use Topics    Alcohol use: Yes     Comment: occasionally    Drug use: No     Review of Systems   Constitutional:  Positive for chills and fever.   HENT:  Negative for sore throat.    Respiratory:  Negative for shortness of breath.    Cardiovascular:  Negative for chest pain.   Gastrointestinal:  Positive for abdominal pain. Negative for nausea.   Genitourinary:  Positive for dysuria, flank pain and hematuria.   Musculoskeletal:  Negative for back pain.   Skin:  Negative for rash.   Neurological:  Negative for weakness.   Hematological:  Does not bruise/bleed easily.       Physical Exam     Initial Vitals [10/16/24 1525]   BP Pulse Resp Temp SpO2   (!) 178/81 75 19 98.2 °F (36.8 °C) 98 %      MAP       --         Physical Exam    Nursing note and vitals reviewed.  Constitutional: Vital signs are normal. She appears well-developed and well-nourished.  Non-toxic appearance. No distress.   HENT:   Head: Normocephalic and atraumatic.   Eyes: EOM are normal. Pupils are equal, round, and reactive to light.   Neck: Neck supple. No JVD present.   Normal range of motion.  Cardiovascular:  Normal rate, regular rhythm, normal heart sounds and intact distal pulses.     Exam reveals no gallop and no friction rub.       No murmur heard.  Pulmonary/Chest: Breath sounds normal. She has no wheezes. She has no rhonchi. She has no rales.   Abdominal: Abdomen is soft. Bowel sounds are normal. There is no abdominal tenderness.   No right CVA tenderness.  No left CVA tenderness. There is no rebound and no guarding.   Musculoskeletal:         General: Normal range of motion.      Cervical back: Normal range of motion and neck supple. No rigidity.     Neurological: She is alert and oriented to person, place, and time. She has normal strength and normal reflexes. No cranial nerve  deficit or sensory deficit. She exhibits normal muscle tone. Coordination normal. GCS eye subscore is 4. GCS verbal subscore is 5. GCS motor subscore is 6.   Skin: Skin is warm and dry.   Psychiatric: She has a normal mood and affect. Her speech is normal and behavior is normal. She is not actively hallucinating.         ED Course   Procedures  Labs Reviewed   COMPREHENSIVE METABOLIC PANEL - Abnormal       Result Value    Sodium 140      Potassium 3.6      Chloride 106      CO2 24      Glucose 84      BUN 13      Creatinine 1.0      Calcium 9.4      Total Protein 6.8      Albumin 3.7      Total Bilirubin 0.4      Alkaline Phosphatase 80      AST 14      ALT 10      eGFR 58 (*)     Anion Gap 10      Narrative:     Release to patient->Immediate   URINALYSIS, REFLEX TO URINE CULTURE - Abnormal    Specimen UA Urine, Clean Catch      Color, UA Yellow      Appearance, UA Hazy (*)     pH, UA 6.0      Specific Gravity, UA 1.020      Protein, UA 1+ (*)     Glucose, UA Negative      Ketones, UA Negative      Bilirubin (UA) Negative      Occult Blood UA 2+ (*)     Nitrite, UA Negative      Urobilinogen, UA Negative      Leukocytes, UA 3+ (*)     Narrative:     Specimen Source->Urine   URINALYSIS MICROSCOPIC - Abnormal    RBC, UA 50 (*)     WBC, UA >100 (*)     Bacteria Many (*)     Squam Epithel, UA 1      Non-Squam Epith 2 (*)     Hyaline Casts, UA 5 (*)     Microscopic Comment SEE COMMENT      Narrative:     Specimen Source->Urine   CULTURE, URINE   HIV 1 / 2 ANTIBODY    HIV 1/2 Ag/Ab Negative      Narrative:     Release to patient->Immediate   HEPATITIS C ANTIBODY    Hepatitis C Ab Negative      Narrative:     Release to patient->Immediate   CBC W/ AUTO DIFFERENTIAL    WBC 7.29      RBC 4.01      Hemoglobin 12.1      Hematocrit 37.5      MCV 94      MCH 30.2      MCHC 32.3      RDW 13.2      Platelets 193      MPV 10.1      Immature Granulocytes 0.3      Gran # (ANC) 4.9      Immature Grans (Abs) 0.02      Lymph # 1.8       Mono # 0.4      Eos # 0.2      Baso # 0.04      nRBC 0      Gran % 67.5      Lymph % 24.0      Mono % 5.6      Eosinophil % 2.1      Basophil % 0.5      Differential Method Automated      Narrative:     Release to patient->Immediate   LIPASE    Lipase 13      Narrative:     Release to patient->Immediate   CYTOLOGY SPECIMEN-URINE          Imaging Results               CT Renal Stone Study ABD Pelvis WO (Final result)  Result time 10/16/24 19:02:40      Final result by Dallas Lopez MD (10/16/24 19:02:40)                   Impression:      1. Small hiatal hernia.  2. Chronic small hepatic cyst.  3. Poorly defined focus of increased attenuation involving the malachi of the inferior pole of the left kidney.  This is of uncertain etiology.  A small mass at this level is not excludable.  Further evaluation with contrast enhanced CT abdomen and pelvis with delayed imaging and/or retrograde pyelogram as deemed clinically necessary to exclude a small suspicious lesion.  4. Diverticulosis.  This report was flagged in Epic as abnormal.      Electronically signed by: Dallas Lopez  Date:    10/16/2024  Time:    19:02               Narrative:    EXAMINATION:  CT RENAL STONE STUDY ABD PELVIS WO    CLINICAL HISTORY:  Flank pain, kidney stone suspected;    TECHNIQUE:  Low dose axial images, sagittal and coronal reformations were obtained from the lung bases to the pubic symphysis.  Contrast was not administered.    COMPARISON:  CT 12/10/2018.    FINDINGS:  There is a small hiatal hernia.  The liver and spleen are normal in size and attenuation.  Chronic small cyst of the right hepatic dome.  The gallbladder, pancreas and adrenal glands are unremarkable.    Kidneys are normal in size and attenuation.  No renal calculi.  Round 25 mm cyst of the right kidney.  Within the inferior malachi of the left kidney there is a subtle poorly defined focus of increased attenuation which is of uncertain etiology.  A small mass at this level is  not excludable.  No changes of hydronephrosis.  No perinephric inflammatory change.  The ureters are normal in course and caliber.    Air and stool throughout the colon and rectum.  Scattered diverticula within the colon.  No mesenteric inflammatory change.  Appendix is normal in caliber.    Bladder is distended.  Prior hysterectomy.  Vaginal cuff and rectum unremarkable.    No significant mesenteric or retroperitoneal lymphadenopathy.                                       Medications   cefTRIAXone injection 1 g (1 g Intramuscular Given 10/16/24 1951)     Medical Decision Making  74-year-old woman with a history of kidney stones presents emergency department complaining hematuria, dysuria, left lower abdominal pain and right flank pain that gradually began 3 days ago.  Patient felt that she passed a kidney stone but her pain did not go away like it normally does.    Differential diagnosis includes ureterolithiasis, urinary tract infection, appendicitis, diverticulitis.  Urinalysis is nitrite negative with 50 red blood cells, greater than 100 white blood cells and many bacteria with only 1 squamous epithelial.  She is afebrile with a normal white blood cell count of 7.29.  Creatinine is 1.0.  Lipase is 13.  CT renal stone study obtained showed no evidence of ureterolithiasis or acute abnormalities however there is a poor ill defined focus of increased attenuation in the inferior pole of the left kidney of uncertain etiology and unable to exclude a small mass.  Patient informed of the findings and will refer to Urology for further evaluation.  Patient will be started on antibiotics with ciprofloxacin.  Urine culture pending.  Patient given Rocephin in the ED.  Discharged in no acute distress.    Amount and/or Complexity of Data Reviewed  Radiology: ordered.    Risk  Prescription drug management.                                      Clinical Impression:  Final diagnoses:  [N30.01] Acute cystitis with hematuria  (Primary)          ED Disposition Condition    Discharge Stable          ED Prescriptions       Medication Sig Dispense Start Date End Date Auth. Provider    ciprofloxacin HCl (CIPRO) 500 MG tablet Take 1 tablet (500 mg total) by mouth 2 (two) times daily. for 10 days 20 tablet 10/16/2024 10/26/2024 Juan Nolasco MD          Follow-up Information       Follow up With Specialties Details Why Contact Info Additional Information    [facility] - ED Emergency Medicine  As needed, If symptoms worsen [address] [place] [address] [address] [place] [address] 1st floor             Juan Nolasco MD  10/16/24 2034

## 2024-10-16 NOTE — TELEPHONE ENCOUNTER
----- Message from Jos Contreras sent at 10/15/2024 10:28 AM CDT -----  Regarding: Urinary Isuue  Good morning,     Pt called and states she is having some urinary issues. She believes she may have passed a kidney stone stone last night and she is having painful urination still, pt would like call from nurse please.    Thank you!     Call back number: [phone]

## 2024-10-17 NOTE — TELEPHONE ENCOUNTER
Hello, can we please send Franca an e-visit that has the UTI questionnaire for her to fill out? We can then send her over antibiotics.

## 2024-10-18 ENCOUNTER — TELEPHONE (OUTPATIENT)
Dept: FAMILY MEDICINE | Facility: CLINIC | Age: 74
End: 2024-10-18
Payer: MEDICARE

## 2024-10-18 DIAGNOSIS — Z12.39 SCREENING BREAST EXAMINATION: Primary | ICD-10-CM

## 2024-10-18 DIAGNOSIS — Z12.31 ENCOUNTER FOR SCREENING MAMMOGRAM FOR MALIGNANT NEOPLASM OF BREAST: ICD-10-CM

## 2024-10-18 DIAGNOSIS — Z12.39 OTHER SCREENING BREAST EXAMINATION: ICD-10-CM

## 2024-10-18 LAB — BACTERIA UR CULT: ABNORMAL

## 2024-10-18 NOTE — TELEPHONE ENCOUNTER
----- Message from Jayde sent at 10/18/2024 10:21 AM CDT -----  Regarding: non payable DX code on Screening Mammogram  MAMMO DIGITAL SCREENING BILAT WITH CAMERON Warning:  CPT(R) 51415 is not covered. Noncovered dx list: Z12.39    Please add code to Z12.31 - that is a payable DX code instead of Z12.39    Thanks, Jayde

## 2024-10-23 DIAGNOSIS — Z78.0 MENOPAUSE: ICD-10-CM

## 2024-10-24 NOTE — TELEPHONE ENCOUNTER
Patient was not ready to schedule an office visit.  She wanted to see a nephrologist.  Referral was faxed to Dr. Zamora.

## 2024-10-30 ENCOUNTER — OFFICE VISIT (OUTPATIENT)
Dept: UROLOGY | Facility: CLINIC | Age: 74
End: 2024-10-30
Payer: MEDICARE

## 2024-10-30 VITALS — HEIGHT: 62 IN | WEIGHT: 174.19 LBS | BODY MASS INDEX: 32.05 KG/M2

## 2024-10-30 DIAGNOSIS — Z87.442 HISTORY OF KIDNEY STONES: ICD-10-CM

## 2024-10-30 DIAGNOSIS — R31.0 GROSS HEMATURIA: ICD-10-CM

## 2024-10-30 DIAGNOSIS — N30.01 ACUTE CYSTITIS WITH HEMATURIA: Primary | ICD-10-CM

## 2024-10-30 LAB
BACTERIA #/AREA URNS AUTO: NORMAL /HPF
BILIRUBIN, UA POC OHS: NEGATIVE
BLOOD, UA POC OHS: ABNORMAL
CLARITY, UA POC OHS: CLEAR
COLOR, UA POC OHS: YELLOW
GLUCOSE, UA POC OHS: NEGATIVE
KETONES, UA POC OHS: NEGATIVE
LEUKOCYTES, UA POC OHS: NEGATIVE
MICROSCOPIC COMMENT: NORMAL
NITRITE, UA POC OHS: NEGATIVE
PH, UA POC OHS: 5.5
PROTEIN, UA POC OHS: NEGATIVE
RBC #/AREA URNS AUTO: 0 /HPF (ref 0–4)
SPECIFIC GRAVITY, UA POC OHS: 1.01
SQUAMOUS #/AREA URNS AUTO: 1 /HPF
UROBILINOGEN, UA POC OHS: 0.2
WBC #/AREA URNS AUTO: 2 /HPF (ref 0–5)

## 2024-10-30 PROCEDURE — 81001 URINALYSIS AUTO W/SCOPE: CPT | Performed by: UROLOGY

## 2024-10-30 PROCEDURE — 99999 PR PBB SHADOW E&M-EST. PATIENT-LVL III: CPT | Mod: PBBFAC,,, | Performed by: UROLOGY

## 2024-10-30 PROCEDURE — 87086 URINE CULTURE/COLONY COUNT: CPT | Performed by: UROLOGY

## 2024-10-31 ENCOUNTER — TELEPHONE (OUTPATIENT)
Dept: RADIOLOGY | Facility: HOSPITAL | Age: 74
End: 2024-10-31

## 2024-11-01 ENCOUNTER — HOSPITAL ENCOUNTER (OUTPATIENT)
Dept: RADIOLOGY | Facility: HOSPITAL | Age: 74
Discharge: HOME | End: 2024-11-01
Attending: UROLOGY
Payer: MEDICARE

## 2024-11-01 DIAGNOSIS — N30.01 ACUTE CYSTITIS WITH HEMATURIA: ICD-10-CM

## 2024-11-01 DIAGNOSIS — R31.0 GROSS HEMATURIA: ICD-10-CM

## 2024-11-01 LAB
BACTERIA UR CULT: NORMAL
BACTERIA UR CULT: NORMAL

## 2024-11-01 PROCEDURE — 25500020 PHARM REV CODE 255

## 2024-11-01 PROCEDURE — 74178 CT ABD&PLV WO CNTR FLWD CNTR: CPT | Mod: TC

## 2024-11-01 RX ADMIN — IOHEXOL 125 ML: 350 INJECTION, SOLUTION INTRAVENOUS at 02:11

## 2024-11-06 ENCOUNTER — HOSPITAL ENCOUNTER (OUTPATIENT)
Dept: RADIOLOGY | Facility: HOSPITAL | Age: 74
Discharge: HOME | End: 2024-11-06
Payer: MEDICARE

## 2024-11-06 VITALS — WEIGHT: 174 LBS | BODY MASS INDEX: 32.02 KG/M2 | HEIGHT: 62 IN

## 2024-11-06 DIAGNOSIS — Z12.39 OTHER SCREENING BREAST EXAMINATION: ICD-10-CM

## 2024-11-06 DIAGNOSIS — Z12.39 SCREENING BREAST EXAMINATION: ICD-10-CM

## 2024-11-06 DIAGNOSIS — Z78.0 MENOPAUSE: ICD-10-CM

## 2024-11-06 DIAGNOSIS — Z12.31 ENCOUNTER FOR SCREENING MAMMOGRAM FOR MALIGNANT NEOPLASM OF BREAST: ICD-10-CM

## 2024-11-06 PROCEDURE — 77080 DXA BONE DENSITY AXIAL: CPT | Mod: TC,PO

## 2024-11-06 PROCEDURE — 77067 SCR MAMMO BI INCL CAD: CPT | Mod: 26,,, | Performed by: RADIOLOGY

## 2024-11-06 PROCEDURE — 77063 BREAST TOMOSYNTHESIS BI: CPT | Mod: 26,,, | Performed by: RADIOLOGY

## 2024-11-06 PROCEDURE — 77080 DXA BONE DENSITY AXIAL: CPT | Mod: 26,,, | Performed by: RADIOLOGY

## 2024-11-06 PROCEDURE — 77063 BREAST TOMOSYNTHESIS BI: CPT | Mod: TC,PO

## 2024-11-13 ENCOUNTER — PROCEDURE VISIT (OUTPATIENT)
Dept: UROLOGY | Facility: CLINIC | Age: 74
End: 2024-11-13
Payer: MEDICARE

## 2024-11-13 VITALS — BODY MASS INDEX: 32.01 KG/M2 | WEIGHT: 173.94 LBS | HEIGHT: 62 IN

## 2024-11-13 DIAGNOSIS — R31.0 GROSS HEMATURIA: Primary | ICD-10-CM

## 2024-11-13 DIAGNOSIS — N28.89 OTHER SPECIFIED DISORDERS OF KIDNEY AND URETER: ICD-10-CM

## 2024-11-13 PROCEDURE — 52000 CYSTOURETHROSCOPY: CPT | Mod: S$GLB,,, | Performed by: UROLOGY

## 2024-11-13 PROCEDURE — 99499 UNLISTED E&M SERVICE: CPT | Mod: S$GLB,,, | Performed by: UROLOGY

## 2024-11-13 PROCEDURE — 87086 URINE CULTURE/COLONY COUNT: CPT | Performed by: UROLOGY

## 2024-11-13 RX ORDER — NITROFURANTOIN 25; 75 MG/1; MG/1
100 CAPSULE ORAL 2 TIMES DAILY
Qty: 6 CAPSULE | Refills: 0 | Status: SHIPPED | OUTPATIENT
Start: 2024-11-13 | End: 2024-11-16

## 2024-11-13 NOTE — PROCEDURES
[facility]  Operative/Discharge Note    Date: 11/13/2024    Pre-Op Diagnosis: Gross hematuria    Post-Op Diagnosis: Same    Procedure(s) Performed:   1.  Cystoscopy     Specimen(s): Urine culture    Staff Surgeon: Topher Giang MD    Assistant Surgeon: Topher Giang Jr, MD    Anesthesia: Local anesthesia topical 2% lidocaine gel    Indications: Franca Coffey is a 74 y.o. female with gross hematuria.     Findings: Unremarkable cystoscopy.     Estimated Blood Loss: min    Drains: None    Procedure in Detail:  After risks, benefits and possible complications of cystoscopy were explained, the patient elected to undergo the procedure and informed consent was obtained. All questions were answered in the geoff-operative area. The patient was transferred to the cystoscopy suite and placed in the lithotomy position.  The patient was prepped and draped in the usual sterile fashion. Lidocaine jelly was administered for local anesthesia. Time out was performed.    A flexible cystoscope was introduced into the bladder per urethra. This passed easily.  The entire urethra was visualized which showed no masses or strictures. The right and left ureteral orifices were identified in the normal anatomic position and were seen effluxing clear urine.  Formal cystoscopy was performed which revealed no masses or lesions suspicious for malignancy, no trabeculations, no bladder stones and no bladder diverticula.      The patient tolerated the procedure well and was transferred to recovery in stable condition.    Disposition: Home    Discharge home today status post uncomplicated procedure as above  Diet - resume home diet  Follow up: MRI abdomen with contrast as CT with an indeterminate renal lesion. If negative, RTC as needed.   Instructions: Macrobid x 3 days.   Meds:     Medication List            Accurate as of November 13, 2024  8:56 AM. If you have any questions, ask your nurse or doctor.                START taking these  medications      nitrofurantoin (macrocrystal-monohydrate) 100 MG capsule  Commonly known as: MACROBID  Take 1 capsule (100 mg total) by mouth 2 (two) times daily. for 3 days  Started by: Topher Giang MD            CONTINUE taking these medications      albuterol 90 mcg/actuation inhaler  Commonly known as: PROAIR HFA  Inhale 2 puffs into the lungs every 6 (six) hours as needed for Wheezing. Rescue     aspirin 81 MG Chew  Take 1 tablet (81 mg total) by mouth once daily.     buPROPion 300 MG 24 hr tablet  Commonly known as: WELLBUTRIN XL  TAKE 1 TABLET EVERY DAY     cetirizine 10 MG tablet  Commonly known as: ZYRTEC     chlordiazepoxide-clidinium 5-2.5 mg 5-2.5 mg Cap  Commonly known as: LIBRAX     diclofenac sodium 1 % Gel  Commonly known as: VOLTAREN ARTHRITIS PAIN  Apply 2 g topically once daily.     DULoxetine 60 MG capsule  Commonly known as: CYMBALTA  Take 1 capsule (60 mg total) by mouth once daily.     esomeprazole 40 MG capsule  Commonly known as: NEXIUM  Take 1 capsule (40 mg total) by mouth once daily.     gabapentin 100 MG capsule  Commonly known as: NEURONTIN  Take 1 capsule (100 mg total) by mouth 2 (two) times daily.     levothyroxine 75 MCG tablet  Commonly known as: SYNTHROID  TAKE 1 TABLET DAILY BEFORE BREAKFAST     lisinopriL 20 MG tablet  Commonly known as: PRINIVIL,ZESTRIL  Take 2 tablets (40 mg total) by mouth once daily.     metoprolol succinate 25 MG 24 hr tablet  Commonly known as: TOPROL-XL  Take 1 tablet (25 mg total) by mouth once daily.     ONE DAILY MULTIVITAMIN per tablet  Generic drug: multivitamin     rOPINIRole 2 MG tablet  Commonly known as: REQUIP  Take 1 tablet (2 mg total) by mouth every evening.     rosuvastatin 10 MG tablet  Commonly known as: CRESTOR  Take 1 tablet (10 mg total) by mouth once daily.     topiramate 100 MG tablet  Commonly known as: TOPAMAX  Take 0.5 tablets (50 mg total) by mouth once daily.               Where to Get Your Medications        These medications  were sent to [id number] - [place], [place] - [facility] AT [facility] & [address] [address]      Hours: 24-hours Phone: [phone]   nitrofurantoin (macrocrystal-monohydrate) 100 MG capsule         Topher Giang Jr, MD

## 2024-11-14 LAB — BACTERIA UR CULT: NO GROWTH

## 2024-11-18 ENCOUNTER — TELEPHONE (OUTPATIENT)
Dept: FAMILY MEDICINE | Facility: CLINIC | Age: 74
End: 2024-11-18
Payer: MEDICARE

## 2024-11-21 ENCOUNTER — OFFICE VISIT (OUTPATIENT)
Dept: OTOLARYNGOLOGY | Facility: CLINIC | Age: 74
End: 2024-11-21
Payer: MEDICARE

## 2024-11-21 VITALS
BODY MASS INDEX: 31.61 KG/M2 | HEART RATE: 74 BPM | DIASTOLIC BLOOD PRESSURE: 84 MMHG | SYSTOLIC BLOOD PRESSURE: 152 MMHG | WEIGHT: 172.81 LBS

## 2024-11-21 DIAGNOSIS — H53.19 VISUAL DISTORTION: ICD-10-CM

## 2024-11-21 DIAGNOSIS — J32.3 CHRONIC SPHENOIDAL SINUSITIS: ICD-10-CM

## 2024-11-21 DIAGNOSIS — H81.09 MENIERE'S DISEASE, UNSPECIFIED LATERALITY: Primary | ICD-10-CM

## 2024-11-21 DIAGNOSIS — R42 DIZZINESS: ICD-10-CM

## 2024-11-21 PROCEDURE — 99999 PR PBB SHADOW E&M-EST. PATIENT-LVL IV: CPT | Mod: PBBFAC,,, | Performed by: STUDENT IN AN ORGANIZED HEALTH CARE EDUCATION/TRAINING PROGRAM

## 2024-11-21 RX ORDER — FUROSEMIDE 40 MG/1
1 TABLET ORAL DAILY
COMMUNITY

## 2024-11-21 NOTE — PROGRESS NOTES
"Otolaryngology Clinic Note    Subjective:       Patient ID: Franca Coffey is a 74 y.o. female.    Chief Complaint: Dizziness      History of Present Illness: Franca Coffey is a 74 y.o. female presenting with dizziness.   Summary of outside records  "She has dx of Meniere's from 2011. Reported VNG done at that time unsure which ear. Was given meclizine and low salt diet and has used meclizine as needed. Takes lasix. Has records from [place] [facility] reporting weekly episodes of feeling drunk, having disequilibrium and falling frequently. Reports room spinning a couple of times a month. Vertigo lasts minutes to hours. Tried PT for BPPV and did not help - in [place]. Some nausea. Some headache and blurry vision. No hearing loss. Some ear pressure. No tinnitus with episodes. Has migraine hx, TLS, fibromyalgia.   She had VNG and VEMP in [place] in March 2024.   VNG showed peripheral vestibulopathy (not localized on test report) with possible central component. Postural stability with sway in all conditions. VEMP was symmetrical, however possible response at low-threshold at left which can be suggestive of SSCD. Tracking revealed abnormal gain in both directions.   Had CT t-bone with no evidence of SSCD after.   She did have chronic left sphenoidal sinusitis on her scan. Given abx for this as well as saline rinses and flonase. Only did abx. Has nasal congestion, periorbital pressure and PND. Dizziness has mild improvement after tx per note.   Has had 2 TIAs in past 5 years.  She has symmetric hearing"    She is still dizzy. She reports regular mental status changes, does not know where she is. Has good days and bad days with balance, bouncing off the walls. She does have room spinning about once a month now. Lasts all day. Head feels huge and heavy. She has headaches and pressure in her face. Bumped back of head falling out of RV and back of head still has bump. She feels like hearing is different during dizziness. She " has ear pressure when she has sinus pressure. No real tinnitus. Sees ghost people when she is dizzy and at other times. She has never been on preventative. This has been worse 4-5 years. Took 3 years to get into ENT in [place]. Denies regular migraines.   She is on ropinirole, wellbutrin. Has not come off wellbutrin. Did miss ropinorole past week and dizziness seemed even worse.   She does have PND at night. Not taking allergy meds anymore. No sinus rinses. Did flonase. Did not help PND.   Her left foot is numb to the ankle after surgery in past. Vision issues come and go. Reports sees things where they are not supposed to be . Has not seen Optho here.       Past Surgical History:   Procedure Laterality Date    BLADDER SUSPENSION      BREAST BIOPSY      COLONOSCOPY  8/27/14    Dr. Thomas, 5 year recheck    dental implant      upper RT implant    EXCISION OF MEDIAL MENISCUS OF KNEE Right 9/18/2018    Procedure: MENISCECTOMY, KNEE, MEDIAL;  Surgeon: Pradeep Cuellar MD;  Location: [facility] OR;  Service: Orthopedics;  Laterality: Right;  medial and lateral    EYE SURGERY      bilateral PHACO with IOL    FOOT SURGERY Bilateral 12/05/2008    bunionectomy    FRACTURE SURGERY      HYSTERECTOMY      JOINT REPLACEMENT Left     Partial knee    JOINT REPLACEMENT Left     Total knee replacement    KNEE ARTHROSCOPY Bilateral     KNEE ARTHROSCOPY W/ MENISCECTOMY Right 9/18/2018    Procedure: ARTHROSCOPY, KNEE, WITH MENISCECTOMY;  Surgeon: Pradeep Cuellar MD;  Location: [facility] OR;  Service: Orthopedics;  Laterality: Right;    MANDIBLE FRACTURE SURGERY      MULTIPLE TOOTH EXTRACTIONS      RADIOFREQUENCY ABLATION OF LUMBAR MEDIAL BRANCH NERVE AT SINGLE LEVEL Bilateral 4/1/2019    Procedure: Radiofrequency Ablation, Nerve, Spinal, Lumbar, Medial Branch, L3,4,5;  Surgeon: Delmar Rosas MD;  Location: [facility] OR;  Service: Pain Management;  Laterality: Bilateral;  L3,4,5 - Burned at 80 degrees C. for 75 seconds x 2 each site    revision  of mesh      repair to bladder suspension    TONSILLECTOMY, ADENOIDECTOMY      UPPER GASTROINTESTINAL ENDOSCOPY      VAGINAL DELIVERY      times 2     Past Medical History:   Diagnosis Date    Anxiety     Cataract     Depression     Diabetes mellitus     managing with diet    Dizzy     Fibromyalgia     GERD (gastroesophageal reflux disease)     History of bladder infections     Hypertension     Hypothyroid     IBS (irritable bowel syndrome)     Kidney stones     Meniere disease     Migraine     Muscle spasms of head and/or neck     and lower ext    Osteoarthritis     Osteopenia     PONV (postoperative nausea and vomiting)     severe-cause by morphine per pt    Sleep apnea     CPAP    Vision disorder      Social Drivers of Health     Tobacco Use: Medium Risk (11/13/2024)    Patient History     Smoking Tobacco Use: Former     Smokeless Tobacco Use: Never     Passive Exposure: Not on file   Alcohol Use: Not on file   Financial Resource Strain: Not on file   Food Insecurity: Not on file   Transportation Needs: Not on file   Physical Activity: Not on file   Stress: Not on file   Housing Stability: Not on file   Depression: Low Risk  (10/11/2024)    Depression     Last PHQ-4: Flowsheet Data: 0   Utilities: Not on file   Health Literacy: Not on file   Social Isolation: Not on file     Review of patient's allergies indicates:   Allergen Reactions    Sulfa (sulfonamide antibiotics) Hives, Itching and Rash    Penicillins      Other reaction(s): Unknown. Childhood reaction. Pt does not remember reaction.    Adhesive Itching and Rash     Paper tape OK    Garlic Diarrhea    Morphine Nausea And Vomiting     Current Outpatient Medications   Medication Instructions    albuterol (PROAIR HFA) 90 mcg/actuation inhaler 2 puffs, Inhalation, Every 6 hours PRN, Rescue    aspirin 81 mg, Oral, Daily    buPROPion (WELLBUTRIN XL) 300 MG 24 hr tablet TAKE 1 TABLET EVERY DAY    cetirizine (ZYRTEC) 10 MG tablet TK 1 T PO QHS     chlordiazepoxide-clidinium 5-2.5 mg (LIBRAX) 5-2.5 mg Cap Take by mouth. 2 qam, 1 q noon, 1 qhs    diclofenac sodium (VOLTAREN ARTHRITIS PAIN) 2 g, Topical (Top), Daily    DULoxetine (CYMBALTA) 60 mg, Oral, Daily    esomeprazole (NEXIUM) 40 mg, Oral, Daily    furosemide (LASIX) 40 MG tablet 1 tablet, Daily    gabapentin (NEURONTIN) 100 mg, Oral, 2 times daily    levothyroxine (SYNTHROID) 75 MCG tablet TAKE 1 TABLET DAILY BEFORE BREAKFAST    lisinopriL (PRINIVIL,ZESTRIL) 40 mg, Oral, Daily    metoprolol succinate (TOPROL-XL) 25 mg, Oral, Daily    multivitamin (ONE DAILY MULTIVITAMIN) per tablet 1 tablet, Daily    rOPINIRole (REQUIP) 2 mg, Oral, Nightly    rosuvastatin (CRESTOR) 10 mg, Oral, Daily    topiramate (TOPAMAX) 50 mg, Oral, Daily         ENT ROS negative except as stated above.     Patient answers are not available for this visit.            Objective:      Vitals:    11/21/24 1019   BP: (!) 152/84   Pulse: 74       General: NAD, well appearing  Eyes: Normal conjunctiva and lids  Face: symmetric, nerve intact  Nose: The nose is without any evidence of any deformity. The nasal mucosa is moist. The septum is midline. There is no evidence of septal hematoma. The turbinates are without abnormality.   Ears: The ears are with normal-appearing pinna. Examination of the canals is normal appearing bilaterally. There is no drainage or erythema noted. The tympanic membranes are normal appearing with pearly color, normal-appearing landmarks and normal light reflex. Hearing is grossly intact.  Mouth: No obvious abnormalities to the lips. The teeth are unremarkable. The gingivae are without any obvious evidence of infection or lesion. The oral mucosa is moist and pink. There are no obvious masses to the hard or soft palate.   Oropharynx: The uvula is midline.  The tongue is midline. The posterior pharynx is without erythema or exudate. The tonsils are normal appearing 0+.  Salivary glands: The salivary glands are  symmetric and not enlarged, no masses  Neck: No lymphadenopathy, trachea midline, thryoid not enlarged.  Psych: Normal mood and affect.   Neuro: Grossly intact  Speech: fluent       Assessment and Plan:       1. Meniere's disease, unspecified laterality    2. Chronic sphenoidal sinusitis    3. Dizziness          She has VNG from [place] reporting peripheral and central vestibulopathy. She has severe balance and fall issues. PT did not help. She has never tried meds for Meniere's, but was dx with this prior. Her hearing is noted to be symmetric. She has also been noted to have chronic left sphenoid sinusitis.   She has multiple reasons for imbalance including left foot numbness and vision changes. Meniere's treatment may or may not help.     Will get new sinus CT and bring her back to review to consider surgery.   Will start preventative medication for meniere's - she is interested in betahistine. Sent to PAP, rexall cannot fill.     Will refer to ophtho to establish care with visual issues.     She has done PT for balance and did not help.     Has numb left foot.     RTC: with CT    Plan of care was discussed in detail with the patient, who agreed with the plan as above. All questions were answered in detail.     Lena Veloz MD  Otolaryngology

## 2024-11-25 ENCOUNTER — HOSPITAL ENCOUNTER (OUTPATIENT)
Dept: RADIOLOGY | Facility: HOSPITAL | Age: 74
Discharge: HOME | End: 2024-11-25
Attending: UROLOGY
Payer: MEDICARE

## 2024-11-25 DIAGNOSIS — R31.0 GROSS HEMATURIA: ICD-10-CM

## 2024-11-25 DIAGNOSIS — N28.89 OTHER SPECIFIED DISORDERS OF KIDNEY AND URETER: ICD-10-CM

## 2024-11-25 PROCEDURE — 25500020 PHARM REV CODE 255

## 2024-11-25 PROCEDURE — A9585 GADOBUTROL INJECTION: HCPCS

## 2024-11-25 PROCEDURE — 74183 MRI ABD W/O CNTR FLWD CNTR: CPT | Mod: TC

## 2024-11-25 PROCEDURE — 74183 MRI ABD W/O CNTR FLWD CNTR: CPT | Mod: 26,,, | Performed by: RADIOLOGY

## 2024-11-25 RX ORDER — GADOBUTROL 604.72 MG/ML
INJECTION INTRAVENOUS
Status: COMPLETED
Start: 2024-11-25 | End: 2024-11-25

## 2024-11-25 RX ADMIN — GADOBUTROL 7 ML: 604.72 INJECTION INTRAVENOUS at 01:11

## 2024-12-05 ENCOUNTER — OFFICE VISIT (OUTPATIENT)
Dept: PODIATRY | Facility: CLINIC | Age: 74
End: 2024-12-05
Payer: MEDICARE

## 2024-12-05 ENCOUNTER — HOSPITAL ENCOUNTER (OUTPATIENT)
Dept: RADIOLOGY | Facility: HOSPITAL | Age: 74
Discharge: HOME | End: 2024-12-05
Attending: PODIATRIST
Payer: MEDICARE

## 2024-12-05 VITALS — BODY MASS INDEX: 31.8 KG/M2 | WEIGHT: 172.81 LBS | HEIGHT: 62 IN

## 2024-12-05 DIAGNOSIS — M62.81 MUSCLE WEAKNESS: ICD-10-CM

## 2024-12-05 DIAGNOSIS — M79.672 FOOT PAIN, LEFT: Primary | ICD-10-CM

## 2024-12-05 DIAGNOSIS — M79.672 FOOT PAIN, LEFT: ICD-10-CM

## 2024-12-05 DIAGNOSIS — M19.072 ARTHRITIS OF LEFT ANKLE: ICD-10-CM

## 2024-12-05 PROCEDURE — 99999 PR PBB SHADOW E&M-EST. PATIENT-LVL III: CPT | Mod: PBBFAC,,, | Performed by: PODIATRIST

## 2024-12-05 PROCEDURE — 1159F MED LIST DOCD IN RCRD: CPT | Mod: CPTII,S$GLB,, | Performed by: PODIATRIST

## 2024-12-05 PROCEDURE — 1101F PT FALLS ASSESS-DOCD LE1/YR: CPT | Mod: CPTII,S$GLB,, | Performed by: PODIATRIST

## 2024-12-05 PROCEDURE — 3288F FALL RISK ASSESSMENT DOCD: CPT | Mod: CPTII,S$GLB,, | Performed by: PODIATRIST

## 2024-12-05 PROCEDURE — 1125F AMNT PAIN NOTED PAIN PRSNT: CPT | Mod: CPTII,S$GLB,, | Performed by: PODIATRIST

## 2024-12-05 PROCEDURE — 99204 OFFICE O/P NEW MOD 45 MIN: CPT | Mod: S$GLB,,, | Performed by: PODIATRIST

## 2024-12-05 PROCEDURE — 73630 X-RAY EXAM OF FOOT: CPT | Mod: 26,LT,, | Performed by: RADIOLOGY

## 2024-12-05 PROCEDURE — 73630 X-RAY EXAM OF FOOT: CPT | Mod: TC,FY,PO,LT

## 2024-12-07 NOTE — PROGRESS NOTES
"Subjective:     Patient ID: Franca Coffey is a 74 y.o. female.    Chief Complaint: Foot Pain    Franca is a 74 y.o. female with a past medical history of Anxiety, Cataract, Depression, Diabetes mellitus, Dizzy, Fibromyalgia, GERD (gastroesophageal reflux disease), History of bladder infections, Hypertension, Hypothyroid, IBS (irritable bowel syndrome), Kidney stones, Meniere disease, Migraine, Muscle spasms of head and/or neck, Osteoarthritis, Osteopenia, PONV (postoperative nausea and vomiting), Sleep apnea, and Vision disorder. Patient presents to clinic out of concern regarding pain and limited ROM of the Lt. Foot/ankle.  Relates the foot feels as though it's "flopping" when she walks, as extension is limited.  Notes having prior reconstructive surgery, in [place], back in 2012.  Relates being off the limb for over 8 months as the surgical sites healed.  States she is unsure exactly as to what procedure was performed but developed osteomyelitis post op necessitating IV antibiotic via picc line.  Notes currently her ambulation to be limited.  She attempts to wear supportive shoe gear with no relief noted.  Symptoms are partially alleviated with rest.  Inquires as to how pain and weakness can be mitigated going forward.  Denies any additional pedal complaints.      Past Medical History:   Diagnosis Date    Anxiety     Cataract     Depression     Diabetes mellitus     managing with diet    Dizzy     Fibromyalgia     GERD (gastroesophageal reflux disease)     History of bladder infections     Hypertension     Hypothyroid     IBS (irritable bowel syndrome)     Kidney stones     Meniere disease     Migraine     Muscle spasms of head and/or neck     and lower ext    Osteoarthritis     Osteopenia     PONV (postoperative nausea and vomiting)     severe-cause by morphine per pt    Sleep apnea     CPAP    Vision disorder        Past Surgical History:   Procedure Laterality Date    BLADDER SUSPENSION      BREAST " BIOPSY      COLONOSCOPY  8/27/14    Dr. Thomas, 5 year recheck    dental implant      upper RT implant    EXCISION OF MEDIAL MENISCUS OF KNEE Right 9/18/2018    Procedure: MENISCECTOMY, KNEE, MEDIAL;  Surgeon: Pradeep Cuellar MD;  Location: [facility] OR;  Service: Orthopedics;  Laterality: Right;  medial and lateral    EYE SURGERY      bilateral PHACO with IOL    FOOT SURGERY Bilateral 12/05/2008    bunionectomy    FRACTURE SURGERY      HYSTERECTOMY      JOINT REPLACEMENT Left     Partial knee    JOINT REPLACEMENT Left     Total knee replacement    KNEE ARTHROSCOPY Bilateral     KNEE ARTHROSCOPY W/ MENISCECTOMY Right 9/18/2018    Procedure: ARTHROSCOPY, KNEE, WITH MENISCECTOMY;  Surgeon: Pradeep Cuellar MD;  Location: [facility] OR;  Service: Orthopedics;  Laterality: Right;    MANDIBLE FRACTURE SURGERY      MULTIPLE TOOTH EXTRACTIONS      RADIOFREQUENCY ABLATION OF LUMBAR MEDIAL BRANCH NERVE AT SINGLE LEVEL Bilateral 4/1/2019    Procedure: Radiofrequency Ablation, Nerve, Spinal, Lumbar, Medial Branch, L3,4,5;  Surgeon: Delmar Rosas MD;  Location: [facility] OR;  Service: Pain Management;  Laterality: Bilateral;  L3,4,5 - Burned at 80 degrees C. for 75 seconds x 2 each site    revision of mesh      repair to bladder suspension    TONSILLECTOMY, ADENOIDECTOMY      UPPER GASTROINTESTINAL ENDOSCOPY      VAGINAL DELIVERY      times 2       Family History   Problem Relation Name Age of Onset    Diabetes Mother      Heart disease Mother      Kidney disease Mother      Hypertension Mother      Hyperlipidemia Mother      Diabetes Mellitus Mother      Heart disease Father      Diabetes Father      Hypertension Father      Hyperlipidemia Father      Diabetes Mellitus Father      COPD Father      Heart disease Brother      Cancer Brother      Early death Brother      Stomach cancer Brother      Breast cancer Maternal Aunt mother's identical twin     Heart disease Brother      Ovarian cancer Neg Hx      Cataracts Neg Hx       Glaucoma Neg Hx      Macular degeneration Neg Hx      Retinal detachment Neg Hx      Thyroid disease Neg Hx      Stroke Neg Hx      Rheum arthritis Neg Hx      Psoriasis Neg Hx      Osteoarthritis Neg Hx      Lupus Neg Hx      Inflammatory bowel disease Neg Hx      Depression Neg Hx      Chronic back pain Neg Hx      Asthma Neg Hx         Social History     Socioeconomic History    Marital status:    Tobacco Use    Smoking status: Former     Current packs/day: 0.00     Average packs/day: 0.3 packs/day for 1 year (0.3 ttl pk-yrs)     Types: Cigarettes     Start date: 10/15/1965     Quit date: 10/15/1966     Years since quittin.1    Smokeless tobacco: Never   Substance and Sexual Activity    Alcohol use: Yes     Comment: occasionally    Drug use: No    Sexual activity: Not Currently     Partners: Male     Birth control/protection: Post-menopausal, Surgical       Current Outpatient Medications   Medication Sig Dispense Refill    albuterol (PROAIR HFA) 90 mcg/actuation inhaler Inhale 2 puffs into the lungs every 6 (six) hours as needed for Wheezing. Rescue 6.7 g 11    buPROPion (WELLBUTRIN XL) 300 MG 24 hr tablet TAKE 1 TABLET EVERY DAY 90 tablet 1    cetirizine (ZYRTEC) 10 MG tablet TK 1 T PO QHS  5    chlordiazepoxide-clidinium 5-2.5 mg (LIBRAX) 5-2.5 mg Cap Take by mouth. 2 qam, 1 q noon, 1 qhs  0    diclofenac sodium (VOLTAREN ARTHRITIS PAIN) 1 % Gel Apply 2 g topically once daily. 200 g 11    esomeprazole (NEXIUM) 40 MG capsule Take 1 capsule (40 mg total) by mouth once daily. 90 capsule 2    furosemide (LASIX) 40 MG tablet Take 1 tablet by mouth once daily.      gabapentin (NEURONTIN) 100 MG capsule Take 1 capsule (100 mg total) by mouth 2 (two) times daily. 180 capsule 3    levothyroxine (SYNTHROID) 75 MCG tablet TAKE 1 TABLET DAILY BEFORE BREAKFAST 90 tablet 3    lisinopriL (PRINIVIL,ZESTRIL) 20 MG tablet Take 2 tablets (40 mg total) by mouth once daily. 180 tablet 3    metoprolol succinate  (TOPROL-XL) 25 MG 24 hr tablet Take 1 tablet (25 mg total) by mouth once daily. 90 tablet 3    multivitamin (ONE DAILY MULTIVITAMIN) per tablet Take 1 tablet by mouth once daily.      rOPINIRole (REQUIP) 2 MG tablet Take 1 tablet (2 mg total) by mouth every evening. 90 tablet 2    rosuvastatin (CRESTOR) 10 MG tablet Take 1 tablet (10 mg total) by mouth once daily. 90 tablet 3    aspirin 81 MG Chew Take 1 tablet (81 mg total) by mouth once daily.  0    DULoxetine (CYMBALTA) 60 MG capsule Take 1 capsule (60 mg total) by mouth once daily. 90 capsule 3    topiramate (TOPAMAX) 100 MG tablet Take 0.5 tablets (50 mg total) by mouth once daily. 15 tablet 0     No current facility-administered medications for this visit.       Review of patient's allergies indicates:   Allergen Reactions    Sulfa (sulfonamide antibiotics) Hives, Itching and Rash    Penicillins      Other reaction(s): Unknown. Childhood reaction. Pt does not remember reaction.    Adhesive Itching and Rash     Paper tape OK    Garlic Diarrhea    Morphine Nausea And Vomiting        Hemoglobin A1C   Date Value Ref Range Status   10/11/2024 5.8 (H) 4.0 - 5.6 % Final     Comment:     ADA Screening Guidelines:  5.7-6.4%  Consistent with prediabetes  >or=6.5%  Consistent with diabetes    High levels of fetal hemoglobin interfere with the HbA1C  assay. Heterozygous hemoglobin variants (HbS, HgC, etc)do  not significantly interfere with this assay.   However, presence of multiple variants may affect accuracy.     02/26/2019 6.0 (H) 4.0 - 5.6 % Final     Comment:     ADA Screening Guidelines:  5.7-6.4%  Consistent with prediabetes  >or=6.5%  Consistent with diabetes  High levels of fetal hemoglobin interfere with the HbA1C  assay. Heterozygous hemoglobin variants (HbS, HgC, etc)do  not significantly interfere with this assay.   However, presence of multiple variants may affect accuracy.     11/26/2018 5.8 (H) 4.0 - 5.6 % Final     Comment:     ADA Screening  Guidelines:  5.7-6.4%  Consistent with prediabetes  >or=6.5%  Consistent with diabetes  High levels of fetal hemoglobin interfere with the HbA1C  assay. Heterozygous hemoglobin variants (HbS, HgC, etc)do  not significantly interfere with this assay.   However, presence of multiple variants may affect accuracy.           Review of Systems   Constitutional: Negative for chills and fever.   Skin:  Negative for color change and nail changes.   Musculoskeletal:  Positive for joint pain and muscle weakness. Negative for muscle cramps and myalgias.   Gastrointestinal:  Negative for nausea and vomiting.   Neurological:  Negative for paresthesias and weakness.   Psychiatric/Behavioral:  Negative for altered mental status.         Objective:     Physical Exam  Constitutional:       Appearance: Normal appearance. She is not ill-appearing.   Cardiovascular:      Pulses:           Dorsalis pedis pulses are 2+ on the right side and 2+ on the left side.        Posterior tibial pulses are 2+ on the right side and 2+ on the left side.      Comments: CFT is < 3 seconds bilateral.  Pedal hair growth is present bilateral.  Mild edema noted to the Lt. Ankle.  Toes are warm to touch bilateral.    Musculoskeletal:         General: Tenderness present.      Right lower leg: No edema.      Left lower leg: No edema.      Comments: Muscle strength 5/5 in all muscle groups on the Rt. And 4/5 to the anterior muscle group on the Lt.  Mild pain and limited ROM with dorsiflexion of the Lt. Ankle.  Mild pain with palpation to medial and lateral gutters of the Lt. Ankle.  (-) anterior and posterior drawer sign noted to the Lt.  Ankle.     Skin:     Capillary Refill: Capillary refill takes 2 to 3 seconds.      Findings: No bruising, ecchymosis, erythema, signs of injury, laceration, lesion, petechiae or rash.      Comments: Pedal skin has normal turgor, temperature, and texture bilateral.  Toenails x 10 appear normotrophic. Multiple healed cicatrices  noted to the Lt. Ankle and rearfoot.        Neurological:      Mental Status: She is alert.      Sensory: Sensory deficit present.      Motor: Weakness present.      Comments: Light touch is intact bilateral.  Decreased protective sensation noted to the Lt. Forefoot.           Assessment:      Encounter Diagnoses   Name Primary?    Foot pain, left Yes    Muscle weakness     Arthritis of left ankle      Plan:     Franca was seen today for foot pain.    Diagnoses and all orders for this visit:    Foot pain, left  -     X-Ray Foot Complete Left; Future  -     EMG W/ ULTRASOUND AND NERVE CONDUCTION TEST 2 Extremities; Future  -     ANKLE FOOT ORTHOSIS FOR HOME USE    Muscle weakness  -     ANKLE FOOT ORTHOSIS FOR HOME USE    Arthritis of left ankle  -     ANKLE FOOT ORTHOSIS FOR HOME USE      I counseled the patient on her conditions, their implications and medical management.    Orders written for an x-ray of the Lt. Foot.  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  She also has evidence of degenerative arthritis of the ankle.    As such, patient has limited ROM and weakness in the Lt. Foot/ankle.    Will discontinue the NCV/EMG, as her issue is musculoskeletal.    Rx written for a Lt. AFO.      Given information regarding the vendor who provides this service.    Patient to follow up in clinic two weeks upon obtaining the device.    RTC prn.    Maxwell Marcus DPM

## 2024-12-09 ENCOUNTER — TELEPHONE (OUTPATIENT)
Dept: PODIATRY | Facility: CLINIC | Age: 74
End: 2024-12-09
Payer: MEDICARE

## 2024-12-09 NOTE — TELEPHONE ENCOUNTER
Spoke with the patient to provide results per provider's request. She expressed understanding of the results. I let her know that I would let the provider know that she is interested in the brace and that we would provide her with the information/RX orders.

## 2024-12-09 NOTE — TELEPHONE ENCOUNTER
----- Message from Maxwell Marcus DPM sent at 12/7/2024  5:07 PM CST -----  Please let the patient know the x-rays appear to show what's called a triple arthrodesis. This means the surgeon fused the three major joints of the rearfoot/midfoot.  This is typically done in cases of severe arthritis.  All the hardware is in place with fusion at all sites.  She does have some arthritis in the ankle that may be preventing full range of motion at the joint.  That being said, both the arthritis and major fusions are responsible for her lack of mobility of the foot.  As such, no nerve conduction study is needed.  She really needs a brace to better improve upon motion in the foot and ankle.  If interested, we will get her Elio's information, and I can fax a Rx for foot/leg brace.  This should help improve her mobility.  I do want to follow up with her two weeks after she obtains the brace.  ----- Message -----  From: Interface, Rad Results In  Sent: 12/6/2024   8:10 AM CST  To: Maxwell Marcus DPM

## 2024-12-12 ENCOUNTER — TELEPHONE (OUTPATIENT)
Dept: FAMILY MEDICINE | Facility: CLINIC | Age: 74
End: 2024-12-12
Payer: MEDICARE

## 2024-12-17 ENCOUNTER — PATIENT MESSAGE (OUTPATIENT)
Dept: PODIATRY | Facility: CLINIC | Age: 74
End: 2024-12-17
Payer: MEDICARE

## 2024-12-17 ENCOUNTER — OFFICE VISIT (OUTPATIENT)
Dept: OTOLARYNGOLOGY | Facility: CLINIC | Age: 74
End: 2024-12-17
Payer: MEDICARE

## 2024-12-17 ENCOUNTER — HOSPITAL ENCOUNTER (OUTPATIENT)
Dept: RADIOLOGY | Facility: HOSPITAL | Age: 74
Discharge: HOME | End: 2024-12-17
Attending: STUDENT IN AN ORGANIZED HEALTH CARE EDUCATION/TRAINING PROGRAM
Payer: MEDICARE

## 2024-12-17 VITALS
SYSTOLIC BLOOD PRESSURE: 141 MMHG | HEIGHT: 62 IN | DIASTOLIC BLOOD PRESSURE: 83 MMHG | WEIGHT: 172.38 LBS | HEART RATE: 62 BPM | BODY MASS INDEX: 31.72 KG/M2

## 2024-12-17 DIAGNOSIS — R51.9 NONINTRACTABLE HEADACHE, UNSPECIFIED CHRONICITY PATTERN, UNSPECIFIED HEADACHE TYPE: ICD-10-CM

## 2024-12-17 DIAGNOSIS — R42 DIZZINESS: ICD-10-CM

## 2024-12-17 DIAGNOSIS — L98.9 SCALP LESION: ICD-10-CM

## 2024-12-17 DIAGNOSIS — J32.3 CHRONIC SPHENOIDAL SINUSITIS: Primary | ICD-10-CM

## 2024-12-17 DIAGNOSIS — C44.90 SKIN CANCER: ICD-10-CM

## 2024-12-17 DIAGNOSIS — J32.3 CHRONIC SPHENOIDAL SINUSITIS: ICD-10-CM

## 2024-12-17 PROCEDURE — 3288F FALL RISK ASSESSMENT DOCD: CPT | Mod: CPTII,S$GLB,, | Performed by: STUDENT IN AN ORGANIZED HEALTH CARE EDUCATION/TRAINING PROGRAM

## 2024-12-17 PROCEDURE — 31231 NASAL ENDOSCOPY DX: CPT | Mod: S$GLB,,, | Performed by: STUDENT IN AN ORGANIZED HEALTH CARE EDUCATION/TRAINING PROGRAM

## 2024-12-17 PROCEDURE — 1159F MED LIST DOCD IN RCRD: CPT | Mod: CPTII,S$GLB,, | Performed by: STUDENT IN AN ORGANIZED HEALTH CARE EDUCATION/TRAINING PROGRAM

## 2024-12-17 PROCEDURE — 1126F AMNT PAIN NOTED NONE PRSNT: CPT | Mod: CPTII,S$GLB,, | Performed by: STUDENT IN AN ORGANIZED HEALTH CARE EDUCATION/TRAINING PROGRAM

## 2024-12-17 PROCEDURE — 70486 CT MAXILLOFACIAL W/O DYE: CPT | Mod: 26,,, | Performed by: RADIOLOGY

## 2024-12-17 PROCEDURE — 99999 PR PBB SHADOW E&M-EST. PATIENT-LVL V: CPT | Mod: PBBFAC,,, | Performed by: STUDENT IN AN ORGANIZED HEALTH CARE EDUCATION/TRAINING PROGRAM

## 2024-12-17 PROCEDURE — 99214 OFFICE O/P EST MOD 30 MIN: CPT | Mod: 25,S$GLB,, | Performed by: STUDENT IN AN ORGANIZED HEALTH CARE EDUCATION/TRAINING PROGRAM

## 2024-12-17 PROCEDURE — 70486 CT MAXILLOFACIAL W/O DYE: CPT | Mod: TC,PO

## 2024-12-17 PROCEDURE — 3077F SYST BP >= 140 MM HG: CPT | Mod: CPTII,S$GLB,, | Performed by: STUDENT IN AN ORGANIZED HEALTH CARE EDUCATION/TRAINING PROGRAM

## 2024-12-17 PROCEDURE — 3079F DIAST BP 80-89 MM HG: CPT | Mod: CPTII,S$GLB,, | Performed by: STUDENT IN AN ORGANIZED HEALTH CARE EDUCATION/TRAINING PROGRAM

## 2024-12-17 PROCEDURE — 1100F PTFALLS ASSESS-DOCD GE2>/YR: CPT | Mod: CPTII,S$GLB,, | Performed by: STUDENT IN AN ORGANIZED HEALTH CARE EDUCATION/TRAINING PROGRAM

## 2024-12-17 RX ORDER — DOXYCYCLINE 100 MG/1
100 CAPSULE ORAL EVERY 12 HOURS
Qty: 28 CAPSULE | Refills: 0 | Status: SHIPPED | OUTPATIENT
Start: 2024-12-17 | End: 2024-12-31

## 2024-12-17 RX ORDER — PREDNISONE 20 MG/1
20 TABLET ORAL DAILY
Qty: 5 TABLET | Refills: 0 | Status: SHIPPED | OUTPATIENT
Start: 2024-12-17 | End: 2024-12-22

## 2024-12-17 RX ORDER — MUPIROCIN 20 MG/G
OINTMENT TOPICAL 2 TIMES DAILY
Qty: 30 G | Refills: 1 | Status: SHIPPED | OUTPATIENT
Start: 2024-12-17 | End: 2024-12-31

## 2024-12-17 NOTE — PATIENT INSTRUCTIONS
PLEASE PERFORM SINUS RINSES 2 TIMES DAILY UNTIL YOUR NEXT VISIT and after surgery.     Take doxycyline now.   Take prednisone 5 days leading up to surgery.     Peroxide then mupirocin to scalp wound.           DIRECTIONS FOR SINUS SALINE RINSE To see demonstration: Enter http://[email or web address].com/watch?[email or web address] into the browser address box, or go to You tube, and under the search box, enter sinus rinse. Click on [employer] Sinus Rinse Video    Step 1    Step 1 Please wash your hands. Fill the clean bottle with the designated volume of warm distilled water, filtered water or previously boiled water. You may warm the water in a microwave but we recommend that you warm it in increments of five seconds. This is to avoid excessive heating of the water and damage to the device or scalding your nasal passage.    Step 2    Step 2 Cut the SINUS RINSE mixture packet at the corner and pour its contents into the bottle. Tighten the cap & tube on the bottle securely, place one finger over the tip of the cap and shake the bottle gently to dissolve the mixture.      Step 3    Step 3 Standing in front of a sink, bend forward to your comfort level and tilt your head down. Keeping your mouth open without holding your breath, place cap snugly against your nasal passage and SQUEEZE BOTTLE GENTLY until the solution starts draining from the OPPOSITE nasal passage or from your mouth. Keep squeezing the bottle GENTLY until at least 1/4 to 1/2 (60 to 120 mL) of the bottle is used for a proper rinse. Do not swallow the solution.    Step 4    Blow your nose gently, without pinching your nose completely because this will apply pressure on the eardrums. If tolerable, sniff in any residual solution remaining in the nasal passage once or twice prior to blowing your nose as this may clean out the posterior nasopharyngeal area (the area at the back of your nasal passage). Some solution will reach the back of the throat, so please spit it out.  To help improve drainage of any residual solution, blow your nose gently while tilting your head to the opposite side of the nasal passage that you just rinsed.    Step 5    Now repeat steps 3 & 4 for your other nasal passage.    Step 6     Air dry the Sinus Rinse bottle, cap, and tube on a clean paper towel, a glass plate to store the bottle cap and tube. If there is any solution leftover, please discard it. We recommend you make a fresh solution each time you rinse. Rinse 5 times each day, OR as directed by your physician. Warnings: DO NOT RINSE IF NASAL PASSAGE IS COMPLETELY BLOCKED OR IF YOU HAVE AN EAR INFECTION OR BLOCKED EARS. If you have had ear surgery, please contact your physician prior to irrigation. If you experience any pressure in your ears, stop the rinse and get further directions from your physician or contact our office during regular business hours. To avoid any ear discomfort: Heat the solution to lukewarm, do not use hot, boiling or cold water. Keep your mouth open. Do not hold your breath and if possible make the sound SANAZ....SANAZ... Make sure to take the position as shown. Gently squeeze 1/4 of the bottle at a time (60mL / 2 ounces). Stop the rinse if you feel any solution sensation near the ears. You may rinse with a partially blocked nasal passage. Please do not use for any other purposes. Please rinse at least ONE HOUR PRIOR to bedtime, in order to avoid any residual solution dripping in the throat.    >> Before using the SINUS RINSE kit, please inspect the cap, tube and bottle carefully for wear and tear. If any of the components appear discolored or cracked, please contact [employer] to obtain a replacement. You must follow the cleaning instructions provided in this brochure or cleaning instruction card prior to each use.    >> The SINUS RINSE bottle and mixture are to be used only for nasalirrigation. Do not use for any other purposes.    >> We recommend that you use the rinse ONE HOUR  PRIOR to bedtime in order to avoid any residual solution dripping in the throat.    Tips to avoid ear discomfort while rinsing    If you have had ear surgery, please contact your physician prior to irrigation. Do not use if you have an ear infection or blocked ears. Rinse with lukewarm water. Keep your mouth open. Do not hold your breath while rinsing. While rinsing, make sure to tilt your. Gently squeeze the bottle while rinsing; do not squeeze the bottle very forcefully. Stop the rinse if you feel a sensation of fluid near your ears.    Tips to avoid unexpected drainage after rinsing    In rare situations, especially if you have had sinus surgery, the saline solution can pool in the sinus cavities and nasal passages and then drip from your nostrils hours after rinsing. To avoid this harmless but annoying inconvenience, take one extra step after rinsing: lean forward, tilt your head sideways and gently blow your nose. Then, tilt your head to the other side and blow again. You may need to repeat this several times. This will help rid your nasal passages of any excess mucus and remaining saline solution. If you find yourself experiencing delayed drainage often, do not rinse right before leaving your house or going to bed.

## 2024-12-17 NOTE — PROGRESS NOTES
"Otolaryngology Clinic Note    Subjective:       Patient ID: Franca Coffey is a 74 y.o. female.    Chief Complaint: Consult (Review ct scan), Headache, and Sinus Problem    11/21/24  History of Present Illness: Franca Coffey is a 74 y.o. female presenting with dizziness.   Summary of outside records  "She has dx of Meniere's from 2011. Reported VNG done at that time unsure which ear. Was given meclizine and low salt diet and has used meclizine as needed. Takes lasix. Has records from [place] [facility] reporting weekly episodes of feeling drunk, having disequilibrium and falling frequently. Reports room spinning a couple of times a month. Vertigo lasts minutes to hours. Tried PT for BPPV and did not help - in [place]. Some nausea. Some headache and blurry vision. No hearing loss. Some ear pressure. No tinnitus with episodes. Has migraine hx, TLS, fibromyalgia.   She had VNG and VEMP in [place] in March 2024.   VNG showed peripheral vestibulopathy (not localized on test report) with possible central component. Postural stability with sway in all conditions. VEMP was symmetrical, however possible response at low-threshold at left which can be suggestive of SSCD. Tracking revealed abnormal gain in both directions.   Had CT t-bone with no evidence of SSCD after.   She did have chronic left sphenoidal sinusitis on her scan. Given abx for this as well as saline rinses and flonase. Only did abx. Has nasal congestion, periorbital pressure and PND. Dizziness has mild improvement after tx per note.   Has had 2 TIAs in past 5 years.  She has symmetric hearing"    She is still dizzy. She reports regular mental status changes, does not know where she is. Has good days and bad days with balance, bouncing off the walls. She does have room spinning about once a month now. Lasts all day. Head feels huge and heavy. She has headaches and pressure in her face. Bumped back of head falling out of RV and back of head still has bump. She " feels like hearing is different during dizziness. She has ear pressure when she has sinus pressure. No real tinnitus. Sees ghost people when she is dizzy and at other times. She has never been on preventative. This has been worse 4-5 years. Took 3 years to get into ENT in [place]. Denies regular migraines.   She is on ropinirole, wellbutrin. Has not come off wellbutrin. Did miss ropinorole past week and dizziness seemed even worse.   She does have PND at night. Not taking allergy meds anymore. No sinus rinses. Did flonase. Did not help PND.   Her left foot is numb to the ankle after surgery in past. Vision issues come and go. Reports sees things where they are not supposed to be . Has not seen Optho here.     12/17/24; RTC. Did betahistine past 30 days. No change on this. Balance bad today. Did have room spinning 1 day this month. Wakes up and sitting up gets very dizzy. Not spinning. Not seeing stars. No ear fullness or ringing. Right still full sometimes. If she sneezes a lot, makes dizziness better. Headaches are the same. She reports that she has had scab on scalp with pain sometimes since fall 3 months ago. Not healing. No skin cancer history. PND not causing as much raw throat. She has done flonase, saline. Did not tolerate rinses. Has done oral AH. Has done abx multiple times over past years.       Past Surgical History:   Procedure Laterality Date    BLADDER SUSPENSION      BREAST BIOPSY      COLONOSCOPY  8/27/14    Dr. Thomas, 5 year recheck    dental implant      upper RT implant    EXCISION OF MEDIAL MENISCUS OF KNEE Right 9/18/2018    Procedure: MENISCECTOMY, KNEE, MEDIAL;  Surgeon: Pradeep Cuellar MD;  Location: [facility];  Service: Orthopedics;  Laterality: Right;  medial and lateral    EYE SURGERY      bilateral PHACO with IOL    FOOT SURGERY Bilateral 12/05/2008    bunionectomy    FRACTURE SURGERY      HYSTERECTOMY      JOINT REPLACEMENT Left     Partial knee    JOINT REPLACEMENT Left     Total knee  replacement    KNEE ARTHROSCOPY Bilateral     KNEE ARTHROSCOPY W/ MENISCECTOMY Right 9/18/2018    Procedure: ARTHROSCOPY, KNEE, WITH MENISCECTOMY;  Surgeon: Pradeep Cuellar MD;  Location: [facility] OR;  Service: Orthopedics;  Laterality: Right;    MANDIBLE FRACTURE SURGERY      MULTIPLE TOOTH EXTRACTIONS      RADIOFREQUENCY ABLATION OF LUMBAR MEDIAL BRANCH NERVE AT SINGLE LEVEL Bilateral 4/1/2019    Procedure: Radiofrequency Ablation, Nerve, Spinal, Lumbar, Medial Branch, L3,4,5;  Surgeon: Delmar Rosas MD;  Location: [facility] OR;  Service: Pain Management;  Laterality: Bilateral;  L3,4,5 - Burned at 80 degrees C. for 75 seconds x 2 each site    revision of mesh      repair to bladder suspension    TONSILLECTOMY, ADENOIDECTOMY      UPPER GASTROINTESTINAL ENDOSCOPY      VAGINAL DELIVERY      times 2     Past Medical History:   Diagnosis Date    Anxiety     Cataract     Depression     Diabetes mellitus     managing with diet    Dizzy     Fibromyalgia     GERD (gastroesophageal reflux disease)     History of bladder infections     Hypertension     Hypothyroid     IBS (irritable bowel syndrome)     Kidney stones     Meniere disease     Migraine     Muscle spasms of head and/or neck     and lower ext    Osteoarthritis     Osteopenia     PONV (postoperative nausea and vomiting)     severe-cause by morphine per pt    Sleep apnea     CPAP    Vision disorder      Social Drivers of Health     Tobacco Use: Medium Risk (11/13/2024)    Patient History     Smoking Tobacco Use: Former     Smokeless Tobacco Use: Never     Passive Exposure: Not on file   Alcohol Use: Not on file   Financial Resource Strain: Not on file   Food Insecurity: Not on file   Transportation Needs: Not on file   Physical Activity: Not on file   Stress: Not on file   Housing Stability: Not on file   Depression: Low Risk  (10/11/2024)    Depression     Last PHQ-4: Flowsheet Data: 0   Utilities: Not on file   Health Literacy: Not on file   Social Isolation: Not on  file     Review of patient's allergies indicates:   Allergen Reactions    Sulfa (sulfonamide antibiotics) Hives, Itching and Rash    Penicillins      Other reaction(s): Unknown. Childhood reaction. Pt does not remember reaction.    Adhesive Itching and Rash     Paper tape OK    Garlic Diarrhea    Morphine Nausea And Vomiting     Current Outpatient Medications   Medication Instructions    albuterol (PROAIR HFA) 90 mcg/actuation inhaler 2 puffs, Inhalation, Every 6 hours PRN, Rescue    aspirin 81 mg, Oral, Daily    buPROPion (WELLBUTRIN XL) 300 MG 24 hr tablet TAKE 1 TABLET EVERY DAY    cetirizine (ZYRTEC) 10 MG tablet TK 1 T PO QHS    chlordiazepoxide-clidinium 5-2.5 mg (LIBRAX) 5-2.5 mg Cap Take by mouth. 2 qam, 1 q noon, 1 qhs    diclofenac sodium (VOLTAREN ARTHRITIS PAIN) 2 g, Topical (Top), Daily    doxycycline (VIBRAMYCIN) 100 mg, Oral, Every 12 hours    DULoxetine (CYMBALTA) 60 mg, Oral, Daily    esomeprazole (NEXIUM) 40 mg, Oral, Daily    furosemide (LASIX) 40 MG tablet 1 tablet, Daily    gabapentin (NEURONTIN) 100 mg, Oral, 2 times daily    levothyroxine (SYNTHROID) 75 MCG tablet TAKE 1 TABLET DAILY BEFORE BREAKFAST    lisinopriL (PRINIVIL,ZESTRIL) 40 mg, Oral, Daily    metoprolol succinate (TOPROL-XL) 25 mg, Oral, Daily    multivitamin (ONE DAILY MULTIVITAMIN) per tablet 1 tablet, Daily    mupirocin (BACTROBAN) 2 % ointment Topical (Top), 2 times daily, To scalp scab    predniSONE (DELTASONE) 20 mg, Oral, Daily    rOPINIRole (REQUIP) 2 mg, Oral, Nightly    rosuvastatin (CRESTOR) 10 mg, Oral, Daily    topiramate (TOPAMAX) 50 mg, Oral, Daily         ENT ROS negative except as stated above.     Patient answers are not available for this visit.            Objective:      Vitals:    12/17/24 1314   BP: (!) 141/83   Pulse: 62         General: NAD, well appearing  Eyes: Normal conjunctiva and lids  Scalp: has 1.5 cm thin scab over occiput. Some pink edges.  Face: symmetric, nerve intact  Nose: The nose is without  any evidence of any deformity. The nasal mucosa is moist. The septum is midline. There is no evidence of septal hematoma. The turbinates are without abnormality.   Ears: The ears are with normal-appearing pinna. Examination of the canals is normal appearing bilaterally. There is no drainage or erythema noted. The tympanic membranes are normal appearing with pearly color, normal-appearing landmarks and normal light reflex. Hearing is grossly intact.  Mouth: No obvious abnormalities to the lips. The teeth are unremarkable. The gingivae are without any obvious evidence of infection or lesion. The oral mucosa is moist and pink. There are no obvious masses to the hard or soft palate.   Oropharynx: The uvula is midline.  The tongue is midline. The posterior pharynx is without erythema or exudate. The tonsils are normal appearing 0+.  Salivary glands: The salivary glands are symmetric and not enlarged, no masses  Neck: No lymphadenopathy, trachea midline, thryoid not enlarged.  Psych: Normal mood and affect.   Neuro: Grossly intact  Speech: fluent    Procedure: Nasal Endoscopy  Indications: chronic sinusitis   Verbal consent obtained  Anesthesia: topical lidocaine and phenylephrine spray    Procedure: Rigid 30 degree endoscope was passed into each nare to nasopharynx showing findings below.     Septum is deviated left with spur. Nasal mucosa moist. Turbinates are normal size and respond to decongestant appropriately. Right maxillary sinus and sphenoethmoidal recess without purulence or polyps. Left maxillary sinus and sphenoethmoidal recess without purulence or polyps. Sphenoid os appears narrowed on right. Mucosal edema sphenoid os bilaterally. No adenoid hypertrophy.    Ct today: bilateral sphenoid sinusitis    Narrative & Impression  [facility]     CLINICAL HISTORY:  sphenoid sinusitis;  Chronic sphenoidal sinusitis     TECHNIQUE:  Axial low-dose CT images performed through the  head/paranasal sinuses via [employer] technique.  Axial, sagittal and coronal reconstructions performed.  Contrast was not administered.     COMPARISON:  Brain MRI 12/10/2018.     FINDINGS:  Aeration: Progressive opacification of the sphenoid sinuses which are centrally completely opacified aside from minimal residual aeration within the anterior right sphenoid sinus.  Regional osteoneogenesis of the sphenoid sinuses.  Opacification of the sphenoid ethmoid recesses.     The frontal sinuses, frontoethmoid recesses, maxillary sinuses and outflow tracks including the ostiomeatal units, and nasal passages are clear.  Mild mucosal thickening within the ethmoid sinuses measuring less than 3 mm in thickness.  No dependent fluid levels.  Dental implants and bony excrescence along the floor the right maxillary sinus.  This is incidentally noted.     Nasal Septum: Leftward nasal septal deviation on the order of 4 mm.     Cribiform Plate: Olfactory recesses are symmetric.  Keros classification type II.  No dehiscence.     Lamina Papyracea: Unremarkable.     Onodi Cells: Not present.     Sphenoid Pneumatization: Sellar.     Anterior Clinoid pneumatization: Pneumatized on the right but appears to be opacified..     Anterior Ethmoid artery: Mildly exposed bilaterally..     Other Variant anatomy: Bilateral middle turbinate amanda bullosa.     Other: The visualized contents of the orbits, intracranial space, and cervical soft tissues are within normal limits.     Impression:     1. Progressive obstructive changes at the level of the sphenoid ethmoid recesses with essentially complete opacification of the sphenoid sinuses and regional osteoneogenesis.  Paranasal sinuses are otherwise clear.  No dependent fluid levels.  2. Leftward nasal septal deviation.        Electronically signed by:Axel Rangel  Date:                                            12/17/2024  Time:                                           15:49       Assessment and  Plan:       1. Chronic sphenoidal sinusitis    2. Dizziness    3. Nonintractable headache, unspecified chronicity pattern, unspecified headache type    4. Scalp lesion    5. Skin cancer            She has VNG from [place] reporting peripheral and central vestibulopathy. She has severe balance and fall issues. PT did not help. She has never tried meds for Meniere's, but was dx with this prior. Her hearing is noted to be symmetric. She has also been noted to have chronic left sphenoid sinusitis.   She has multiple reasons for imbalance including left foot numbness and vision changes. Meniere's treatment may or may not help.     preventative medication for meniere's - rx to [facility] for betahistine in November. Has not helped over past month. She wants to stop. Will see what sinus surgery does for dizziness.     CT today: bl sphenoid sinusitis  She has done flonase, saline. Did not tolerate rinses. Has done oral AH. Has done abx multiple times over past years.   Consider surgery bilateral sphenoidotomy with removal of contents as she has not had improvement in dizziness and disease is worse than prior CT which I reviewed. She is agreeable to proceed.   I discussed at length the risk of nasal /  sinus surgery with the patient including, but not limited to, recurrence/persistence of symptoms, bleeding, infection, tearing abnormality, septal perforation, tooth or cheek numbness, vision changes, orbital injury, CSF leak and changes in smell.  The patient had opportunity to ask questions and I answered all of them to their satisfaction.  We will proceed as planned.   Will do doxycyline x 2 weeks now.   Prednisone 20 mg x 5 days prior to surgery.     Scalp lesion.   If still present at time of surgery. Will plan to biopsy and close this with chromic sutures as there is concern for skin cancer with lack of healing. Mupirocin now.       Referred to ophtho to establish care with visual issues.     She has done PT for balance and did not  help.     Has numb left foot.     RTC:10 days post op.     Plan of care was discussed in detail with the patient, who agreed with the plan as above. All questions were answered in detail.     Lena Veloz MD  Otolaryngology

## 2024-12-26 PROBLEM — E87.6 ACUTE HYPOKALEMIA: Status: ACTIVE | Noted: 2024-12-26

## 2024-12-26 PROBLEM — J32.3 CHRONIC SPHENOIDAL SINUSITIS: Status: ACTIVE | Noted: 2024-12-26

## 2024-12-27 ENCOUNTER — TELEPHONE (OUTPATIENT)
Dept: OTOLARYNGOLOGY | Facility: CLINIC | Age: 74
End: 2024-12-27
Payer: COMMERCIAL

## 2024-12-27 NOTE — TELEPHONE ENCOUNTER
Patient had findings c/w allergic fungal sinusitis or mycetoma. No acute intervention needed. Will follow culture.

## 2024-12-27 NOTE — TELEPHONE ENCOUNTER
----- Message from Dandre sent at 12/27/2024 12:11 PM CST -----  Type: Needs Medical Advice    Who Called:  Gely from [facility] lab     Best Call Back Number: [phone]    Additional Information: Gely from [facility] lab calling for critical value for pt. Please call back to advise, Thanks!

## 2024-12-27 NOTE — TELEPHONE ENCOUNTER
S/w Mali at [facility] lab who is calling with a critical lab value, message as follows:    Afb stain is positive from incision site at left nare.

## 2025-01-05 ENCOUNTER — OFFICE VISIT (OUTPATIENT)
Dept: URGENT CARE | Facility: CLINIC | Age: 75
End: 2025-01-05
Payer: MEDICARE

## 2025-01-05 VITALS
WEIGHT: 173 LBS | SYSTOLIC BLOOD PRESSURE: 110 MMHG | DIASTOLIC BLOOD PRESSURE: 74 MMHG | HEART RATE: 65 BPM | HEIGHT: 62 IN | RESPIRATION RATE: 18 BRPM | TEMPERATURE: 98 F | OXYGEN SATURATION: 94 % | BODY MASS INDEX: 31.83 KG/M2

## 2025-01-05 DIAGNOSIS — R09.81 SINUS CONGESTION: ICD-10-CM

## 2025-01-05 DIAGNOSIS — R05.1 ACUTE COUGH: Primary | ICD-10-CM

## 2025-01-05 LAB
CTP QC/QA: YES
CTP QC/QA: YES
FLUAV AG NPH QL: NEGATIVE
FLUBV AG NPH QL: NEGATIVE
SARS-COV-2 AG RESP QL IA.RAPID: NEGATIVE

## 2025-01-05 PROCEDURE — 87811 SARS-COV-2 COVID19 W/OPTIC: CPT | Mod: QW,S$GLB,, | Performed by: STUDENT IN AN ORGANIZED HEALTH CARE EDUCATION/TRAINING PROGRAM

## 2025-01-05 PROCEDURE — 87804 INFLUENZA ASSAY W/OPTIC: CPT | Mod: QW,,, | Performed by: STUDENT IN AN ORGANIZED HEALTH CARE EDUCATION/TRAINING PROGRAM

## 2025-01-05 PROCEDURE — 99214 OFFICE O/P EST MOD 30 MIN: CPT | Mod: S$GLB,,, | Performed by: STUDENT IN AN ORGANIZED HEALTH CARE EDUCATION/TRAINING PROGRAM

## 2025-01-05 RX ORDER — LEVOCETIRIZINE DIHYDROCHLORIDE 5 MG/1
5 TABLET, FILM COATED ORAL NIGHTLY
Qty: 30 TABLET | Refills: 0 | Status: SHIPPED | OUTPATIENT
Start: 2025-01-05 | End: 2026-01-05

## 2025-01-05 RX ORDER — AZITHROMYCIN 250 MG/1
TABLET, FILM COATED ORAL
Qty: 6 TABLET | Refills: 0 | Status: SHIPPED | OUTPATIENT
Start: 2025-01-05

## 2025-01-05 RX ORDER — PROMETHAZINE HYDROCHLORIDE AND DEXTROMETHORPHAN HYDROBROMIDE 6.25; 15 MG/5ML; MG/5ML
5 SYRUP ORAL EVERY 4 HOURS PRN
Qty: 118 ML | Refills: 0 | Status: SHIPPED | OUTPATIENT
Start: 2025-01-05 | End: 2025-01-15

## 2025-01-05 RX ORDER — FLUTICASONE PROPIONATE 50 MCG
1 SPRAY, SUSPENSION (ML) NASAL 2 TIMES DAILY
Qty: 11.1 ML | Refills: 0 | Status: SHIPPED | OUTPATIENT
Start: 2025-01-05

## 2025-01-05 NOTE — PATIENT INSTRUCTIONS
Thankyou for the opportunity to take care of you today.  Please take all medications as directed, continue any previous prescribed medications unless we specifically discussed holding them.  If  your situation becomes emergent please present to the nearest emergency department.  Follow-up with your PCP for continued evaluation and management.  It is important that she follow up with your ENT

## 2025-01-05 NOTE — PROGRESS NOTES
"Subjective:      Patient ID: Franca Coffey is a 74 y.o. female.    Vitals:  height is 5' 2" (1.575 m) and weight is 78.5 kg (173 lb). Her temperature is 97.6 °F (36.4 °C). Her blood pressure is 110/74 and her pulse is 65. Her respiration is 18 and oxygen saturation is 94% (abnormal).     Chief Complaint: Cough    Ambulatory to room with complaint of sinus congestion, symptoms present since the day after Dee.  Just before Dee patient states had her "sinuses cleaned out" by her ENT.  Claims subjective fevers with a T-max of 101°.    Cough  This is a new problem. The current episode started 1 to 4 weeks ago (x 1 week). The problem has been gradually worsening. Associated symptoms include a fever, nasal congestion and postnasal drip.       Constitution: Positive for fever.   HENT:  Positive for congestion and postnasal drip.    Respiratory:  Positive for cough.       Objective:     Physical Exam   Constitutional: She is oriented to person, place, and time. She appears well-developed. She is cooperative.  Non-toxic appearance. She does not appear ill. No distress.   HENT:   Head: Normocephalic and atraumatic.   Ears:   Right Ear: Hearing, tympanic membrane, external ear and ear canal normal.   Left Ear: Hearing, tympanic membrane, external ear and ear canal normal.   Nose: Rhinorrhea and congestion present. No mucosal edema or nasal deformity. No epistaxis. Right sinus exhibits no maxillary sinus tenderness and no frontal sinus tenderness. Left sinus exhibits no maxillary sinus tenderness and no frontal sinus tenderness.   Mouth/Throat: Uvula is midline, oropharynx is clear and moist and mucous membranes are normal. Mucous membranes are moist. No trismus in the jaw. Normal dentition. No uvula swelling. No oropharyngeal exudate, posterior oropharyngeal edema or posterior oropharyngeal erythema. Oropharynx is clear.   Eyes: Conjunctivae and lids are normal. No scleral icterus.   Neck: Trachea normal " and phonation normal. Neck supple. No edema present. No erythema present. No neck rigidity present.   Cardiovascular: Normal rate, regular rhythm, normal heart sounds and normal pulses.   Pulmonary/Chest: Effort normal and breath sounds normal. No stridor. No respiratory distress. She has no decreased breath sounds. She has no wheezes. She has no rhonchi. She has no rales.   Abdominal: Normal appearance.   Musculoskeletal: Normal range of motion.         General: No deformity. Normal range of motion.   Neurological: She is alert and oriented to person, place, and time. She exhibits normal muscle tone. Coordination normal.   Skin: Skin is warm, dry, intact, not diaphoretic and not pale.   Psychiatric: Her speech is normal and behavior is normal. Judgment and thought content normal.   Nursing note and vitals reviewed.      Assessment:     1. Acute cough    2. Sinus congestion        Plan:       Acute cough  -     POCT Influenza A/B Rapid Antigen  -     SARS Coronavirus 2 Antigen, POCT Manual Read    Sinus congestion    Other orders  -     fluticasone propionate (FLONASE) 50 mcg/actuation nasal spray; 1 spray (50 mcg total) by Each Nostril route 2 (two) times a day.  Dispense: 11.1 mL; Refill: 0  -     promethazine-dextromethorphan (PROMETHAZINE-DM) 6.25-15 mg/5 mL Syrp; Take 5 mLs by mouth every 4 (four) hours as needed.  Dispense: 118 mL; Refill: 0  -     levocetirizine (XYZAL) 5 MG tablet; Take 1 tablet (5 mg total) by mouth every evening.  Dispense: 30 tablet; Refill: 0  -     azithromycin (Z-KENJI) 250 MG tablet; Take 2 tablets by mouth on day 1; Take 1 tablet by mouth on days 2-5  Dispense: 6 tablet; Refill: 0        Flu and COVID negative   Antihistamines, inhaled steroid, antitussives as needed.  Tylenol and ibuprofen as needed for pain and body aches.  Instructions given for when to return to the clinic or present to the ED.  Recommended follow up with the ENT as some of her symptoms may possibly be related to  the procedure she had.  - To ED for any new or acutely worsening symptoms including but not limited to chest pain, palpitations, shortness of breath, or fever greater than 103° F.  Patient in agreement with plan of care.    - The diagnosis, treatment plan, instructions for follow-up and reevaluation as well as ED precautions were discussed and understanding was verbalized. All questions or concerns have been addressed.  -Follow up with your primary care provider for continued evaluation and management.

## 2025-01-09 ENCOUNTER — TELEPHONE (OUTPATIENT)
Dept: OTOLARYNGOLOGY | Facility: CLINIC | Age: 75
End: 2025-01-09
Payer: MEDICARE

## 2025-01-09 NOTE — TELEPHONE ENCOUNTER
----- Message from Lena Veloz MD sent at 1/9/2025  7:37 AM CST -----  Please call to check in on patient and confirm she is doing rinses 2-3 times daily. She was growing mold in her sinus and needs to be very diligent with rinses to keep this cleared out for the next couple of months.

## 2025-01-14 PROBLEM — J44.9 CHRONIC OBSTRUCTIVE PULMONARY DISEASE, UNSPECIFIED COPD TYPE: Status: ACTIVE | Noted: 2025-01-14

## 2025-01-15 ENCOUNTER — OFFICE VISIT (OUTPATIENT)
Dept: OTOLARYNGOLOGY | Facility: CLINIC | Age: 75
End: 2025-01-15
Payer: MEDICARE

## 2025-01-15 VITALS — WEIGHT: 166.25 LBS | HEIGHT: 62 IN | BODY MASS INDEX: 30.59 KG/M2

## 2025-01-15 DIAGNOSIS — R42 DIZZINESS: ICD-10-CM

## 2025-01-15 DIAGNOSIS — L98.9 SCALP LESION: ICD-10-CM

## 2025-01-15 DIAGNOSIS — J32.3 CHRONIC SPHENOIDAL SINUSITIS: Primary | ICD-10-CM

## 2025-01-15 PROCEDURE — 1159F MED LIST DOCD IN RCRD: CPT | Mod: CPTII,S$GLB,, | Performed by: STUDENT IN AN ORGANIZED HEALTH CARE EDUCATION/TRAINING PROGRAM

## 2025-01-15 PROCEDURE — 99999 PR PBB SHADOW E&M-EST. PATIENT-LVL IV: CPT | Mod: PBBFAC,,, | Performed by: STUDENT IN AN ORGANIZED HEALTH CARE EDUCATION/TRAINING PROGRAM

## 2025-01-15 PROCEDURE — 99024 POSTOP FOLLOW-UP VISIT: CPT | Mod: S$GLB,,, | Performed by: STUDENT IN AN ORGANIZED HEALTH CARE EDUCATION/TRAINING PROGRAM

## 2025-01-15 PROCEDURE — 1100F PTFALLS ASSESS-DOCD GE2>/YR: CPT | Mod: CPTII,S$GLB,, | Performed by: STUDENT IN AN ORGANIZED HEALTH CARE EDUCATION/TRAINING PROGRAM

## 2025-01-15 PROCEDURE — 1126F AMNT PAIN NOTED NONE PRSNT: CPT | Mod: CPTII,S$GLB,, | Performed by: STUDENT IN AN ORGANIZED HEALTH CARE EDUCATION/TRAINING PROGRAM

## 2025-01-15 PROCEDURE — 31231 NASAL ENDOSCOPY DX: CPT | Mod: S$GLB,,, | Performed by: STUDENT IN AN ORGANIZED HEALTH CARE EDUCATION/TRAINING PROGRAM

## 2025-01-15 PROCEDURE — 3288F FALL RISK ASSESSMENT DOCD: CPT | Mod: CPTII,S$GLB,, | Performed by: STUDENT IN AN ORGANIZED HEALTH CARE EDUCATION/TRAINING PROGRAM

## 2025-01-15 NOTE — PROGRESS NOTES
ENT POST OP    PO FESS and scalp lesion excision  12/26/2024     Procedures performed  Bilateral Sphenoid Sinusotomy with removal of tissue  Stereotactic Navigation for 1 hour  1.5 cm excisional biopsy of scalp lesion       Operative findings:  Left: eosinophilic mucin and purulence within left sphenoid sinusitis. Mucous and polyps within right sphenoid sinus.   Right: bilateral amanda bullosa was collapsed with freer  Implants: none  Scalp lesion was cut out in 1.5 cm ellipse with stitch marking and sent for pathology to rule out malignancy.       Mold allergy IgE were negative.     DIAGNOSIS:   12/27/2024 J[facility]/     1. SKIN AND SUBCUTANEOUS TISSUE OF SCALP, EXCISION:     - NO TUMOR SEEN.     - EPIDERMAL INCLUSION CYST WITH ASSOCIATED CHRONIC FOLLICULITIS WITH    FIBROSIS.     2. SINUS CONTENTS:     - SEVERE CHRONIC SINUSITIS.______     ASPERGILLUS FLAVUS   Rare   AFB CULTURE STAIN Rare acid fast bacilli seen Abnormal         Abnormal   METHICILLIN RESISTANT STAPHYLOCOCCUS AUREUS  Moderate       S: got flu 2 days after surgery and has been sick. Dizziness does not seem as bad. Does feel when not eating or tired or dehydrated. Doing rinses. She reports scalp is not healing.  in nursing home with flu now.     O;   Scalp: minor scabbing removed. Healing well. No concerns.     Procedure: Nasal Endoscopy  Indications: post op FESS  Verbal consent obtained  Anesthesia: topical lidocaine and phenylephrine spray    Procedure: Rigid 30 degree endoscope was passed into each nare to nasopharynx showing findings below.     Septum is midline. Nasal mucosa moist. Turbinates are normal size and respond to decongestant appropriately. Right maxillary sinus normal. Left maxillary sinus normal. Right sphenoid patent, no debris, minor scabbing on right posterior ethmoid. Left has scarring from septum to middle turbinate anterior to sphenoid area. Sphenoid is patent without debris within it. No purulence or infection noted. No  adenoid hypertrophy.     A/p:   Healing well from both. 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 Looks cleared.   Continue rinses 1-2 times a day.   Recheck 2 months with scope. Continue ointment.   Contact with concerns.     On nothing for dizziness. Doing better. Will see how she does. Did have vestibular weakness on VNG.

## 2025-02-21 ENCOUNTER — OFFICE VISIT (OUTPATIENT)
Dept: PHYSICAL MEDICINE AND REHAB | Facility: CLINIC | Age: 75
End: 2025-02-21
Payer: MEDICARE

## 2025-02-21 DIAGNOSIS — G63 POLYNEUROPATHY ASSOCIATED WITH UNDERLYING DISEASE: Primary | ICD-10-CM

## 2025-02-21 DIAGNOSIS — M79.672 FOOT PAIN, LEFT: ICD-10-CM

## 2025-02-21 NOTE — PROGRESS NOTES
" [facility]  [address]  [place] [address]             Full Name: Franca Coffey Gender: Female  MRN: [id number] YOB: 1950  History: Patient complaining of left foot numbness and at times the entire left lower extremity will hurt and goes numb,(at times).  Low back pain  that radiates down the left lower extremity.  She feel the left foot "flops" and has had several falls.    Hx X3 back surgeries, left foot surgery.        Visit Date: 2/21/2025 2:03 PM  Age: 75 Years  Examining Physician: Luciana Delacruz DO   Referring Physician: Maxwell Marcus DPM   Height: 5 feet 2 inch      Sensory NCS      Nerve / Sites Rec. Site Onset Lat Peak Lat NP Amp PP Amp Segments Distance Velocity     ms ms µV µV  cm m/s   L Sural - Ankle (Calf)      Calf Ankle NR NR NR NR Calf - Ankle 14 NR   L Superficial peroneal - Ankle      Lat leg Ankle NR NR NR NR Lat leg - Ankle 14 NR       Motor NCS      Nerve / Sites Muscle Latency Amplitude Amp % Duration Segments Distance Lat Diff Velocity     ms mV % ms  cm ms m/s   L Peroneal - EDB      Ankle EDB 5.88 0.7 100 6.04 Ankle - EDB 8        Fib head EDB 12.38 0.6 76.7 7.21 Fib head - Ankle 27 6.50 42      Pop fossa EDB 14.88 0.5 63.7 6.98 Pop fossa - Fib head 10 2.50 40   L Tibial - AH      Ankle AH 4.90 8.7 100 5.15 Ankle - AH 8        Pop fossa AH 14.88 6.6 75.9  Pop fossa - Ankle 42 9.98 42   L Peroneal - Tib Ant      Fib Head Tib Ant 3.58 3.2 100 8.98 Fib Head - Tib Ant         Pop fossa Tib Ant 5.90 2.0 62.2 8.63 Pop fossa - Fib Head 10 2.31 43       EMG Summary Table     Spontaneous MUAP Recruitment   Muscle IA Fib PSW Fasc CRD Amp Dur. Poly Pattern   L. Quadriceps N None None None None N N None N   L. Tibialis anterior N None None None None N N None Reduced   L. Peroneus longus N None None None None N N None N   L. Gastrocnemius (Medial head) N None None None None N N None N   L. Gluteus medius N None None None None N N None N       Summary    The motor " conduction test was performed on 3 nerve(s). The results were normal in 1 nerve(s): L Tibial - AH. Findings were unremarkable in 1 nerve(s): L Peroneal - Tib Ant. Results outside the specified normal range were found in 1 nerve(s), as follows:  In the L Peroneal - EDB study  the peak amplitude result was reduced for Ankle stimulation    The sensory conduction test had results outside of the specified normal range in all 2 of the tested nerves:  In the L Sural - Ankle (Calf) study  the response was considered absent for Calf stimulation  In the L Superficial peroneal - Ankle study  the response was considered absent for Lat leg stimulation    The needle EMG examination was performed in 5 muscles. It was normal in 4 muscle(s): L. Quadriceps, L. Peroneus longus, L. Gastrocnemius (Medial head), L. Gluteus medius. The study was abnormal in 1 muscle(s), with the following distribution:  The L. Tibialis anterior had abnormal interference pattern.       Impression:  Abnormal study.    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  No electrophysiologic evidence of left lumbosacral radiculopathy/plexopathy.    ------------------------------  Luciana Delacruz,

## 2025-02-24 ENCOUNTER — TELEPHONE (OUTPATIENT)
Dept: PODIATRY | Facility: CLINIC | Age: 75
End: 2025-02-24
Payer: MEDICARE

## 2025-02-24 NOTE — TELEPHONE ENCOUNTER
Spoke with pt in regards to results from nerve study per Dr. Marcus. Pt stated has not gotten the brace yet due to 's medical issues. I gave pt [facility] information to call and make appointment. Pt verbalized understanding

## 2025-03-18 ENCOUNTER — LAB VISIT (OUTPATIENT)
Dept: LAB | Facility: HOSPITAL | Age: 75
End: 2025-03-18
Attending: STUDENT IN AN ORGANIZED HEALTH CARE EDUCATION/TRAINING PROGRAM
Payer: MEDICARE

## 2025-03-18 ENCOUNTER — OFFICE VISIT (OUTPATIENT)
Dept: OTOLARYNGOLOGY | Facility: CLINIC | Age: 75
End: 2025-03-18
Payer: MEDICARE

## 2025-03-18 VITALS — BODY MASS INDEX: 30.67 KG/M2 | WEIGHT: 166.69 LBS | HEIGHT: 62 IN

## 2025-03-18 DIAGNOSIS — L98.9 SCALP LESION: ICD-10-CM

## 2025-03-18 DIAGNOSIS — J32.3 CHRONIC SPHENOIDAL SINUSITIS: ICD-10-CM

## 2025-03-18 DIAGNOSIS — R42 DIZZINESS: ICD-10-CM

## 2025-03-18 DIAGNOSIS — R42 DIZZINESS: Primary | ICD-10-CM

## 2025-03-18 DIAGNOSIS — R79.9 ABNORMAL FINDING OF BLOOD CHEMISTRY, UNSPECIFIED: ICD-10-CM

## 2025-03-18 LAB
ALBUMIN SERPL BCP-MCNC: 3.6 G/DL (ref 3.5–5.2)
ALP SERPL-CCNC: 73 U/L (ref 40–150)
ALT SERPL W/O P-5'-P-CCNC: 12 U/L (ref 10–44)
ANION GAP SERPL CALC-SCNC: 8 MMOL/L (ref 8–16)
AST SERPL-CCNC: 17 U/L (ref 10–40)
BILIRUB SERPL-MCNC: 0.3 MG/DL (ref 0.1–1)
BUN SERPL-MCNC: 14 MG/DL (ref 8–23)
CALCIUM SERPL-MCNC: 8.8 MG/DL (ref 8.7–10.5)
CHLORIDE SERPL-SCNC: 106 MMOL/L (ref 95–110)
CO2 SERPL-SCNC: 29 MMOL/L (ref 23–29)
CREAT SERPL-MCNC: 1.1 MG/DL (ref 0.5–1.4)
EST. GFR  (NO RACE VARIABLE): 51.8 ML/MIN/1.73 M^2
GLUCOSE SERPL-MCNC: 81 MG/DL (ref 70–110)
IRON SERPL-MCNC: 57 UG/DL (ref 30–160)
MAGNESIUM SERPL-MCNC: 1.9 MG/DL (ref 1.6–2.6)
POTASSIUM SERPL-SCNC: 3.6 MMOL/L (ref 3.5–5.1)
PROT SERPL-MCNC: 6.7 G/DL (ref 6–8.4)
SATURATED IRON: 19 % (ref 20–50)
SODIUM SERPL-SCNC: 143 MMOL/L (ref 136–145)
TOTAL IRON BINDING CAPACITY: 293 UG/DL (ref 250–450)
TRANSFERRIN SERPL-MCNC: 198 MG/DL (ref 200–375)

## 2025-03-18 PROCEDURE — 3288F FALL RISK ASSESSMENT DOCD: CPT | Mod: CPTII,S$GLB,, | Performed by: STUDENT IN AN ORGANIZED HEALTH CARE EDUCATION/TRAINING PROGRAM

## 2025-03-18 PROCEDURE — 99024 POSTOP FOLLOW-UP VISIT: CPT | Mod: S$GLB,,, | Performed by: STUDENT IN AN ORGANIZED HEALTH CARE EDUCATION/TRAINING PROGRAM

## 2025-03-18 PROCEDURE — 83735 ASSAY OF MAGNESIUM: CPT | Performed by: STUDENT IN AN ORGANIZED HEALTH CARE EDUCATION/TRAINING PROGRAM

## 2025-03-18 PROCEDURE — 80053 COMPREHEN METABOLIC PANEL: CPT | Performed by: STUDENT IN AN ORGANIZED HEALTH CARE EDUCATION/TRAINING PROGRAM

## 2025-03-18 PROCEDURE — 36415 COLL VENOUS BLD VENIPUNCTURE: CPT | Mod: PO | Performed by: STUDENT IN AN ORGANIZED HEALTH CARE EDUCATION/TRAINING PROGRAM

## 2025-03-18 PROCEDURE — 1101F PT FALLS ASSESS-DOCD LE1/YR: CPT | Mod: CPTII,S$GLB,, | Performed by: STUDENT IN AN ORGANIZED HEALTH CARE EDUCATION/TRAINING PROGRAM

## 2025-03-18 PROCEDURE — 99999 PR PBB SHADOW E&M-EST. PATIENT-LVL III: CPT | Mod: PBBFAC,,, | Performed by: STUDENT IN AN ORGANIZED HEALTH CARE EDUCATION/TRAINING PROGRAM

## 2025-03-18 PROCEDURE — 83540 ASSAY OF IRON: CPT | Performed by: STUDENT IN AN ORGANIZED HEALTH CARE EDUCATION/TRAINING PROGRAM

## 2025-03-18 PROCEDURE — 1126F AMNT PAIN NOTED NONE PRSNT: CPT | Mod: CPTII,S$GLB,, | Performed by: STUDENT IN AN ORGANIZED HEALTH CARE EDUCATION/TRAINING PROGRAM

## 2025-03-18 PROCEDURE — 1159F MED LIST DOCD IN RCRD: CPT | Mod: CPTII,S$GLB,, | Performed by: STUDENT IN AN ORGANIZED HEALTH CARE EDUCATION/TRAINING PROGRAM

## 2025-03-18 RX ORDER — TRIAMCINOLONE ACETONIDE 1 MG/G
CREAM TOPICAL 2 TIMES DAILY
Qty: 15 G | Refills: 3 | Status: SHIPPED | OUTPATIENT
Start: 2025-03-18 | End: 2025-03-28

## 2025-03-18 RX ORDER — AZELASTINE 1 MG/ML
1 SPRAY, METERED NASAL 2 TIMES DAILY
Qty: 30 ML | Refills: 11 | Status: SHIPPED | OUTPATIENT
Start: 2025-03-18 | End: 2026-03-18

## 2025-03-18 NOTE — PROGRESS NOTES
ENT POST OP    PO FESS and scalp lesion excision  12/26/2024     Procedures performed  Bilateral Sphenoid Sinusotomy with removal of tissue  Stereotactic Navigation for 1 hour  1.5 cm excisional biopsy of scalp lesion       Operative findings:  Left: eosinophilic mucin and purulence within left sphenoid sinusitis. Mucous and polyps within right sphenoid sinus.   Right: bilateral amanda bullosa was collapsed with freer  Implants: none  Scalp lesion was cut out in 1.5 cm ellipse with stitch marking and sent for pathology to rule out malignancy.       Mold allergy IgE were negative.     DIAGNOSIS:   12/27/2024 JHL/     1. SKIN AND SUBCUTANEOUS TISSUE OF SCALP, EXCISION:     - NO TUMOR SEEN.     - EPIDERMAL INCLUSION CYST WITH ASSOCIATED CHRONIC FOLLICULITIS WITH    FIBROSIS.     2. SINUS CONTENTS:     - SEVERE CHRONIC SINUSITIS.______     ASPERGILLUS FLAVUS   Rare   AFB CULTURE STAIN Rare acid fast bacilli seen Abnormal         Abnormal   METHICILLIN RESISTANT STAPHYLOCOCCUS AUREUS  Moderate       S: she got dizzy last night and fell off end of bed. Has been careful. Started 2 weeks ago. Gets dizzy looking down or up. She is congested again. She is not using rinse. Lost bottle 3 weeks ago. Some headaches. Eyes bothersome today. Took zyrtec a few nights and did help with nasal complaints. Not sure about dizziness.   Scalp has more spots she thinks. Is tender. She has not been taking care of herself with  sick.    with renal failure about to go to SNF.     She is out of electrolytes given from hospital in dec.    O;   Scalp: minor scabbing. Smaller than before.   Right [place]-Hallpike - Yes - short vertigo, No  rotary nystagmus  Left [place]-Hallpike - No  vertigo, No rotary nystagmus  Dizzy when sitting up and standing up.       Name: Franca Kovacs Alexx     Pre-procedure diagnosis: No primary diagnosis found.  Post-procedure diagnosis: Same    Procedure: Bilateral nasal endoscopy  Anesthesia:  4%  Lidocaine and 0.25% Phenylephrine Topical    Indication: This procedure is indicated as anterior rhinoscopy is not sufficient to account for all of the patients symptoms.     Procedure: Risks, benefits, and alternatives of the procedure were discussed with the patient, and consent was obtained to perform a nasal endoscopy.  The nasal cavity was sprayed with a topical decongestant and topical anesthetic. After adequate anesthesia was obtained, the scope was passed into each nostril independently.  The nasal cavities (including inferior turbinates, middle turbinates, inferior meatus, middle meatus, superior meatus, sphenoethmoid recess) nasopharynx, choana, eustachian tube, fossa of Rosenmüller, and adenoids were examined. All findings were normal with exception of description below. At the end of the examination, the scope was removed. The patient tolerated the procedure well with no complications.     Septum is midline. Nasal mucosa moist. Turbinates are normal size and respond to decongestant appropriately. Right maxillary sinus normal. Left maxillary sinus normal. Right sphenoid patent, no debris, minor scabbing on right posterior ethmoid. Left has scarring from septum to middle turbinate anterior to sphenoid area. Sphenoid is patent without debris within it. No purulence or infection noted. No adenoid hypertrophy.     A/p:   Encounter Diagnoses   Name Primary?    Dizziness Yes    Chronic sphenoidal sinusitis     Scalp lesion     Abnormal finding of blood chemistry, unspecified        Healing well from both. 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 Looks cleared.   Ok to be off rinses.   Recheck 6 months with scope.       With allergies, keep taking zyrtec. Add back astelin.     Dizzy was better but started again 2 weeks ago. Has not been taking good care of herself. Not hydrated. Did have vestibular weakness on VNG but this has only  been since  got sick so we will give her time to recover from this and work  on taking care of herself, we will confirm electrolytes are ok.     Scalp still with small scab, may be ingrown hair. Will give her kenalog to place her as able.     Contact with concerns or lack of improvement with dizziness.     Lena Veloz MD

## 2025-03-19 ENCOUNTER — RESULTS FOLLOW-UP (OUTPATIENT)
Dept: OTOLARYNGOLOGY | Facility: CLINIC | Age: 75
End: 2025-03-19

## 2025-03-19 DIAGNOSIS — R42 DIZZINESS: Primary | ICD-10-CM

## 2025-03-19 NOTE — PROGRESS NOTES
Reviewed her labs with her. Electrolytes ok, iron mildly low, eGFR mildly low, possible related to poor hydration. Advised her to get OTC iron and take every other day and drink more water. She would like kidney rechecked as her  is in renal failure.     Will order new renal function test in 2 months and message her PCP to follow up as she has missed follow up with PCP. Advised her to contact me if dizziness not improving.     Dr. Panchal staff- please arrange follow up, missed [place].

## 2025-04-01 DIAGNOSIS — E11.42 TYPE 2 DIABETES MELLITUS WITH DIABETIC POLYNEUROPATHY, WITHOUT LONG-TERM CURRENT USE OF INSULIN: ICD-10-CM

## 2025-04-01 DIAGNOSIS — E11.59 HYPERTENSION ASSOCIATED WITH DIABETES: ICD-10-CM

## 2025-04-01 DIAGNOSIS — E03.9 ACQUIRED HYPOTHYROIDISM: ICD-10-CM

## 2025-04-01 DIAGNOSIS — I15.2 HYPERTENSION ASSOCIATED WITH DIABETES: ICD-10-CM

## 2025-04-01 DIAGNOSIS — G25.81 RESTLESS LEG SYNDROME: ICD-10-CM

## 2025-04-01 DIAGNOSIS — R12 HEARTBURN: ICD-10-CM

## 2025-04-02 RX ORDER — GABAPENTIN 100 MG/1
100 CAPSULE ORAL 2 TIMES DAILY
Qty: 180 CAPSULE | Refills: 3 | Status: SHIPPED | OUTPATIENT
Start: 2025-04-02

## 2025-04-02 RX ORDER — ROPINIROLE 2 MG/1
2 TABLET, FILM COATED ORAL NIGHTLY
Qty: 90 TABLET | Refills: 2 | Status: SHIPPED | OUTPATIENT
Start: 2025-04-02

## 2025-04-02 RX ORDER — LEVOTHYROXINE SODIUM 75 UG/1
TABLET ORAL
Qty: 90 TABLET | Refills: 3 | Status: SHIPPED | OUTPATIENT
Start: 2025-04-02

## 2025-04-02 RX ORDER — ESOMEPRAZOLE MAGNESIUM 40 MG/1
40 CAPSULE, DELAYED RELEASE ORAL DAILY
Qty: 90 CAPSULE | Refills: 2 | Status: SHIPPED | OUTPATIENT
Start: 2025-04-02

## 2025-04-02 RX ORDER — METOPROLOL SUCCINATE 25 MG/1
25 TABLET, EXTENDED RELEASE ORAL DAILY
Qty: 90 TABLET | Refills: 3 | Status: SHIPPED | OUTPATIENT
Start: 2025-04-02

## 2025-04-06 ENCOUNTER — PATIENT MESSAGE (OUTPATIENT)
Dept: FAMILY MEDICINE | Facility: CLINIC | Age: 75
End: 2025-04-06
Payer: MEDICARE

## 2025-04-11 DIAGNOSIS — K21.9 GASTROESOPHAGEAL REFLUX DISEASE WITHOUT ESOPHAGITIS: ICD-10-CM

## 2025-04-11 DIAGNOSIS — R10.84 ABDOMINAL PAIN, GENERALIZED: Primary | ICD-10-CM

## 2025-04-11 RX ORDER — CHLORDIAZEPOXIDE HYDROCHLORIDE AND CLIDINIUM BROMIDE 5; 2.5 MG/1; MG/1
1 CAPSULE ORAL 2 TIMES DAILY PRN
Qty: 60 CAPSULE | Refills: 0 | Status: SHIPPED | OUTPATIENT
Start: 2025-04-11

## 2025-05-12 ENCOUNTER — PATIENT MESSAGE (OUTPATIENT)
Dept: FAMILY MEDICINE | Facility: CLINIC | Age: 75
End: 2025-05-12
Payer: MEDICARE

## 2025-05-13 ENCOUNTER — E-VISIT (OUTPATIENT)
Dept: URGENT CARE | Facility: CLINIC | Age: 75
End: 2025-05-13
Payer: MEDICARE

## 2025-05-13 DIAGNOSIS — R30.0 DYSURIA: Primary | ICD-10-CM

## 2025-05-15 DIAGNOSIS — K21.9 GASTROESOPHAGEAL REFLUX DISEASE WITHOUT ESOPHAGITIS: ICD-10-CM

## 2025-05-15 DIAGNOSIS — R10.84 ABDOMINAL PAIN, GENERALIZED: ICD-10-CM

## 2025-05-16 RX ORDER — CHLORDIAZEPOXIDE HYDROCHLORIDE AND CLIDINIUM BROMIDE 5; 2.5 MG/1; MG/1
1 CAPSULE ORAL 2 TIMES DAILY PRN
Qty: 60 CAPSULE | Refills: 0 | Status: SHIPPED | OUTPATIENT
Start: 2025-05-16

## 2025-05-17 RX ORDER — NITROFURANTOIN 25; 75 MG/1; MG/1
100 CAPSULE ORAL EVERY 12 HOURS
Qty: 14 CAPSULE | Refills: 0 | Status: SHIPPED | OUTPATIENT
Start: 2025-05-17 | End: 2025-05-24

## 2025-05-18 NOTE — PROGRESS NOTES
Patient ID: Franca Coffey is a 75 y.o. female.    Chief Complaint: Urinary Tract Infection (Entered automatically based on patient selection in [employer].)    The patient initiated a request through [employer] on 5/13/2025 for evaluation and management with a chief complaint of Urinary Tract Infection (Entered automatically based on patient selection in [employer].)     I evaluated the questionnaire submission on 5/17/25..    [facility] Peq Evisit Uti Questionnaire    5/17/2025  7:06 PM CDT - Filed by Patient   Do you agree to participate in an E-Visit? Yes   If you have any of the following symptoms, please go to the nearest emergency room or call 911: I acknowledge   Choose the state of your primary residence [place]   What is the main issue you would like addressed today? Uti , no blood, back hurts, my side, i have had lots of kidney stones since i was 16,  i don't  think it is a stone, just a bad infection.It is very painful when i urine. i have stomach spasms  due to ibs & fact i am out of 2 meds, bupropion xl a   Do you have any of the following symptoms? Discomfort or pain passing urine;  Passing urine more frequently   When did your concern begin? 5/10/2025   What medications or treatments have you used to help your symptoms? Drinking more fluids;  Painkillers;  Other   What other medications or treatments have you used for your symptoms? AZO   What does your urine look like? Light yellow   Have you had any of the following symptoms during the past 24 hours?   Urgency (a sudden and uncontrollable urge to urinate) Moderate   Frequency (going to the toilet very often) Moderate   Burning pain when urinating Moderate   Incomplete bladder emptying (still feel like you need to urinate again after urination) (None, Mild, Moderate, Severe) Severe   Pain in the lower belly when youre not urinating. Moderate   Discomfort from your urinary symptoms. Moderate   Have you had any of the following symptoms during the past  24 hours?   Blood seen in the urine None   Pain in the lower back on one or both sides (flank pain) Moderate   Abnormal Vaginal Discharge (abnormal amount, color and/or odor) Mild   Discharge from the opening you urinate from (urethra) when not urinating. None   Have your symptoms interfered with your every day activities? Mild   Do you have a fever? Not sure   Are you a diabetic? No   Are you currently experiencing any of the following while completing this questionnaire? None   Do you have a history of kidney stones? Yes   Provide any additional information you feel is important.    Please attach any relevant images or files (if you have performed a home test for UTI, please submit a photo of results)    Are you able to take your vital signs? No         Encounter Diagnosis   Name Primary?    Dysuria Yes        No orders of the defined types were placed in this encounter.     Medications Ordered This Encounter   Medications    nitrofurantoin, macrocrystal-monohydrate, (MACROBID) 100 MG capsule     Sig: Take 1 capsule (100 mg total) by mouth every 12 (twelve) hours. for 7 days     Dispense:  14 capsule     Refill:  0        No follow-ups on file.      E-Visit Time Tracking:

## 2025-05-19 DIAGNOSIS — R10.84 ABDOMINAL PAIN, GENERALIZED: ICD-10-CM

## 2025-05-19 DIAGNOSIS — K21.9 GASTROESOPHAGEAL REFLUX DISEASE WITHOUT ESOPHAGITIS: ICD-10-CM

## 2025-05-19 RX ORDER — CHLORDIAZEPOXIDE HYDROCHLORIDE AND CLIDINIUM BROMIDE 5; 2.5 MG/1; MG/1
1 CAPSULE ORAL 2 TIMES DAILY PRN
Qty: 60 CAPSULE | Refills: 0 | OUTPATIENT
Start: 2025-05-19

## 2025-05-19 NOTE — TELEPHONE ENCOUNTER
Refill Routing Note   Medication(s) are not appropriate for processing by [facility] for the following reason(s):        Non-participating provider  Outside of protocol    [facility] action(s):  Route             Appointments  past 12m or future 3m with PCP    Date Provider   Last Visit   10/11/2024 Brittney Panchal MD   Next Visit   Visit date not found Brittney Panchal MD   ED visits in past 90 days: 0        Note composed:11:32 AM 05/19/2025

## 2025-06-17 DIAGNOSIS — R10.84 ABDOMINAL PAIN, GENERALIZED: ICD-10-CM

## 2025-06-17 DIAGNOSIS — K21.9 GASTROESOPHAGEAL REFLUX DISEASE WITHOUT ESOPHAGITIS: ICD-10-CM

## 2025-06-18 ENCOUNTER — TELEPHONE (OUTPATIENT)
Dept: OTOLARYNGOLOGY | Facility: CLINIC | Age: 75
End: 2025-06-18
Payer: MEDICARE

## 2025-06-18 RX ORDER — CHLORDIAZEPOXIDE HYDROCHLORIDE AND CLIDINIUM BROMIDE 5; 2.5 MG/1; MG/1
1 CAPSULE ORAL 2 TIMES DAILY PRN
Qty: 60 CAPSULE | Refills: 0 | Status: SHIPPED | OUTPATIENT
Start: 2025-06-18

## 2025-06-18 NOTE — TELEPHONE ENCOUNTER
Scheduled pt for renal labs, pt confirmed.    Pt also wanted to let Dr. Veloz know that her  passed a couple weeks after pt saw Dr. Veloz.

## 2025-07-16 DIAGNOSIS — R12 HEARTBURN: ICD-10-CM

## 2025-07-16 DIAGNOSIS — E11.42 TYPE 2 DIABETES MELLITUS WITH DIABETIC POLYNEUROPATHY, WITHOUT LONG-TERM CURRENT USE OF INSULIN: ICD-10-CM

## 2025-07-16 DIAGNOSIS — R10.84 ABDOMINAL PAIN, GENERALIZED: ICD-10-CM

## 2025-07-16 DIAGNOSIS — G25.81 RESTLESS LEG SYNDROME: ICD-10-CM

## 2025-07-16 DIAGNOSIS — E11.59 HYPERTENSION ASSOCIATED WITH DIABETES: ICD-10-CM

## 2025-07-16 DIAGNOSIS — K21.9 GASTROESOPHAGEAL REFLUX DISEASE WITHOUT ESOPHAGITIS: ICD-10-CM

## 2025-07-16 DIAGNOSIS — I15.2 HYPERTENSION ASSOCIATED WITH DIABETES: ICD-10-CM

## 2025-07-16 DIAGNOSIS — E03.9 ACQUIRED HYPOTHYROIDISM: ICD-10-CM

## 2025-07-16 RX ORDER — CHLORDIAZEPOXIDE HYDROCHLORIDE AND CLIDINIUM BROMIDE 5; 2.5 MG/1; MG/1
1 CAPSULE ORAL 2 TIMES DAILY PRN
Qty: 60 CAPSULE | Refills: 0 | Status: CANCELLED | OUTPATIENT
Start: 2025-07-16

## 2025-07-17 RX ORDER — ROPINIROLE 2 MG/1
2 TABLET, FILM COATED ORAL NIGHTLY
Qty: 90 TABLET | Refills: 2 | OUTPATIENT
Start: 2025-07-17

## 2025-07-17 RX ORDER — ESOMEPRAZOLE MAGNESIUM 40 MG/1
40 CAPSULE, DELAYED RELEASE ORAL DAILY
Qty: 90 CAPSULE | Refills: 2 | OUTPATIENT
Start: 2025-07-17

## 2025-07-17 RX ORDER — LEVOTHYROXINE SODIUM 75 UG/1
TABLET ORAL
Qty: 90 TABLET | Refills: 3 | OUTPATIENT
Start: 2025-07-17

## 2025-07-17 RX ORDER — METOPROLOL SUCCINATE 25 MG/1
25 TABLET, EXTENDED RELEASE ORAL DAILY
Qty: 90 TABLET | Refills: 3 | OUTPATIENT
Start: 2025-07-17

## 2025-07-17 RX ORDER — GABAPENTIN 100 MG/1
100 CAPSULE ORAL 2 TIMES DAILY
Qty: 180 CAPSULE | Refills: 3 | OUTPATIENT
Start: 2025-07-17

## 2025-07-17 NOTE — TELEPHONE ENCOUNTER
Refill Routing Note   Medication(s) are not appropriate for processing by [facility] for the following reason(s):        Outside of protocol  Non-participating provider  No active prescription written by provider    [facility] action(s):  Route               Appointments  past 12m or future 3m with PCP    Date Provider   Last Visit   10/11/2024 Brittney Panchal MD   Next Visit   7/16/2025 Brittney Panchal MD   ED visits in past 90 days: 0        Note composed:7:56 AM 07/17/2025

## 2025-07-21 ENCOUNTER — PATIENT MESSAGE (OUTPATIENT)
Dept: FAMILY MEDICINE | Facility: CLINIC | Age: 75
End: 2025-07-21
Payer: MEDICARE

## 2025-07-21 DIAGNOSIS — R10.84 ABDOMINAL PAIN, GENERALIZED: ICD-10-CM

## 2025-07-21 DIAGNOSIS — K21.9 GASTROESOPHAGEAL REFLUX DISEASE WITHOUT ESOPHAGITIS: ICD-10-CM

## 2025-07-22 ENCOUNTER — TELEPHONE (OUTPATIENT)
Dept: FAMILY MEDICINE | Facility: CLINIC | Age: 75
End: 2025-07-22
Payer: MEDICARE

## 2025-07-22 RX ORDER — CHLORDIAZEPOXIDE HYDROCHLORIDE AND CLIDINIUM BROMIDE 5; 2.5 MG/1; MG/1
1 CAPSULE ORAL 2 TIMES DAILY PRN
Qty: 60 CAPSULE | Refills: 0 | Status: SHIPPED | OUTPATIENT
Start: 2025-07-22

## 2025-07-22 NOTE — TELEPHONE ENCOUNTER
----- Message from Adela Arce MD sent at 7/21/2025  8:45 PM CDT -----  Please call the patient and ask what the Thursday appt is about.   It says a 6 month f/u but she does not follow up with our clinic or me.     If it is an est care visit, please move the patient off my schedule.     Thank you always.

## 2025-07-22 NOTE — TELEPHONE ENCOUNTER
Spoke with patient notified her Dr. Arce is not currently accepting new patients. Rescheduled patient to Establish care with Dr. Taylor. Verbalized understanding

## 2025-07-25 ENCOUNTER — OFFICE VISIT (OUTPATIENT)
Dept: FAMILY MEDICINE | Facility: CLINIC | Age: 75
End: 2025-07-25
Payer: MEDICARE

## 2025-07-25 VITALS
SYSTOLIC BLOOD PRESSURE: 132 MMHG | BODY MASS INDEX: 28.93 KG/M2 | OXYGEN SATURATION: 98 % | DIASTOLIC BLOOD PRESSURE: 80 MMHG | WEIGHT: 157.19 LBS | TEMPERATURE: 98 F | HEART RATE: 63 BPM | HEIGHT: 62 IN

## 2025-07-25 DIAGNOSIS — R20.0 NUMBNESS AND TINGLING: ICD-10-CM

## 2025-07-25 DIAGNOSIS — R79.9 ABNORMAL FINDING OF BLOOD CHEMISTRY, UNSPECIFIED: ICD-10-CM

## 2025-07-25 DIAGNOSIS — Z76.0 MEDICATION REFILL: Primary | ICD-10-CM

## 2025-07-25 DIAGNOSIS — E11.42 TYPE 2 DIABETES MELLITUS WITH DIABETIC POLYNEUROPATHY, WITHOUT LONG-TERM CURRENT USE OF INSULIN: ICD-10-CM

## 2025-07-25 DIAGNOSIS — F32.1 CURRENT MODERATE EPISODE OF MAJOR DEPRESSIVE DISORDER WITHOUT PRIOR EPISODE: ICD-10-CM

## 2025-07-25 DIAGNOSIS — I10 ESSENTIAL HYPERTENSION: ICD-10-CM

## 2025-07-25 DIAGNOSIS — F32.A DEPRESSION, UNSPECIFIED DEPRESSION TYPE: ICD-10-CM

## 2025-07-25 DIAGNOSIS — E03.9 ACQUIRED HYPOTHYROIDISM: ICD-10-CM

## 2025-07-25 DIAGNOSIS — R20.2 NUMBNESS AND TINGLING: ICD-10-CM

## 2025-07-25 DIAGNOSIS — J32.0 CHRONIC MAXILLARY SINUSITIS: ICD-10-CM

## 2025-07-25 DIAGNOSIS — E78.5 HYPERLIPIDEMIA ASSOCIATED WITH TYPE 2 DIABETES MELLITUS: ICD-10-CM

## 2025-07-25 DIAGNOSIS — E83.42 HYPOMAGNESEMIA: ICD-10-CM

## 2025-07-25 DIAGNOSIS — E11.69 HYPERLIPIDEMIA ASSOCIATED WITH TYPE 2 DIABETES MELLITUS: ICD-10-CM

## 2025-07-25 PROCEDURE — 99999 PR PBB SHADOW E&M-EST. PATIENT-LVL V: CPT | Mod: PBBFAC,,, | Performed by: NURSE PRACTITIONER

## 2025-07-25 RX ORDER — CETIRIZINE HYDROCHLORIDE 10 MG/1
5 TABLET ORAL DAILY PRN
Qty: 30 TABLET | Refills: 5 | Status: SHIPPED | OUTPATIENT
Start: 2025-07-25

## 2025-07-25 RX ORDER — FUROSEMIDE 40 MG/1
40 TABLET ORAL DAILY
Qty: 30 TABLET | Refills: 2 | Status: SHIPPED | OUTPATIENT
Start: 2025-07-25

## 2025-07-25 RX ORDER — BUPROPION HYDROCHLORIDE 300 MG/1
300 TABLET ORAL DAILY
Qty: 90 TABLET | Refills: 1 | Status: SHIPPED | OUTPATIENT
Start: 2025-07-25

## 2025-07-25 NOTE — PROGRESS NOTES
"Subjective:       Patient ID: Franca Coffey is a 75 y.o. female.    Chief Complaint: Annual Exam     HPI   74 y/o female patient with medical problems listed below presents for medication refills of Wellbutrin, Lasix, and Cetirizine. She was previously followed by Dr. Panchal for primary care, with her last visit in October 2024. She reports she has not followed up since her  passed away in April 2025. She ran out of her Lasix and has been taking her s Lasix, as he was on the same dose. She stopped taking Crestor due to stomach discomfort and hiccups. Patient brought a list of her current medications. She reports chronic tingling and numbness in both lower legs, which she feels has been worsening recently. She also experiences occasional imbalance while walking. Although she has a cane and walker, she has not been using them, stating she feels they are for "old people."     EMG 2/21/2025  Impression:  Abnormal study.    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  No electrophysiologic evidence of left lumbosacral radiculopathy/plexopathy.    Current taking medications:  Albuterol Inhal as needed, Wellbutrin  mg daily, Cetirizine 10 mg, Duloxetine 60 mg daily for fibromyalgia, Lasix 40 mg daily for high blood pressure, Gabapentin 100 mg bid daily, Levothyroxine 75 mcg daily, Lisinopril 20 mg daily, Toprol XL 25 mg daily, Requip 2 mg nightly for restless leg, Nexium 40 mg daily     Problem List[1]     Review of patient's allergies indicates:   Allergen Reactions    Sulfa (sulfonamide antibiotics) Hives, Itching and Rash    Penicillins      Other reaction(s): Unknown. Childhood reaction. Pt does not remember reaction.    Adhesive Itching and Rash     Paper tape OK    Garlic Diarrhea    Morphine Nausea And Vomiting       Past Surgical History:   Procedure Laterality Date    BLADDER SUSPENSION      BREAST BIOPSY      COLONOSCOPY  8/27/14    Dr." William, 5 year recheck    dental implant      upper RT implant    EXCISION OF MEDIAL MENISCUS OF KNEE Right 9/18/2018    Procedure: MENISCECTOMY, KNEE, MEDIAL;  Surgeon: Pradeep Cuellar MD;  Location: [facility] OR;  Service: Orthopedics;  Laterality: Right;  medial and lateral    EXCISION, SUPERFICIAL MASS, HEAD Bilateral 12/26/2024    Procedure: EXCISION, SUPERFICIAL MASS, HEAD- biopsy lesion and wound closure;  Surgeon: Lena Veloz MD;  Location: [facility] OR;  Service: ENT;  Laterality: Bilateral;    EYE SURGERY      bilateral PHACO with IOL    FOOT SURGERY Bilateral 12/05/2008    bunionectomy    FRACTURE SURGERY      FUNCTIONAL ENDOSCOPIC SINUS SURGERY (FESS) USING COMPUTER-ASSISTED NAVIGATION Bilateral 12/26/2024    Procedure: FESS, USING COMPUTER-ASSISTED NAVIGATION;  Surgeon: Lena Veloz MD;  Location: [facility] OR;  Service: ENT;  Laterality: Bilateral;    HYSTERECTOMY      JOINT REPLACEMENT Left     Partial knee    JOINT REPLACEMENT Left     Total knee replacement    KNEE ARTHROSCOPY Bilateral     KNEE ARTHROSCOPY W/ MENISCECTOMY Right 9/18/2018    Procedure: ARTHROSCOPY, KNEE, WITH MENISCECTOMY;  Surgeon: Pradeep Cuellar MD;  Location: [facility] OR;  Service: Orthopedics;  Laterality: Right;    MANDIBLE FRACTURE SURGERY      MULTIPLE TOOTH EXTRACTIONS      RADIOFREQUENCY ABLATION OF LUMBAR MEDIAL BRANCH NERVE AT SINGLE LEVEL Bilateral 4/1/2019    Procedure: Radiofrequency Ablation, Nerve, Spinal, Lumbar, Medial Branch, L3,4,5;  Surgeon: Delmar Rosas MD;  Location: [facility] OR;  Service: Pain Management;  Laterality: Bilateral;  L3,4,5 - Burned at 80 degrees C. for 75 seconds x 2 each site    revision of mesh      repair to bladder suspension    TONSILLECTOMY, ADENOIDECTOMY      UPPER GASTROINTESTINAL ENDOSCOPY      VAGINAL DELIVERY      times 2      Current Medications[2]    Review of Systems   Constitutional:  Negative for chills and fever.   Respiratory:  Negative for shortness of breath.   "  Cardiovascular:  Negative for chest pain and palpitations.   Gastrointestinal:  Negative for abdominal pain.   Neurological:  Positive for numbness. Negative for dizziness and headaches.       Objective:   /80 (BP Location: Right arm, Patient Position: Sitting)   Pulse 63   Temp 98.1 °F (36.7 °C) (Oral)   Ht 5' 2" (1.575 m)   Wt 71.3 kg (157 lb 3 oz)   SpO2 98%   BMI 28.75 kg/m²         Physical Exam  Constitutional:       General: She is not in acute distress.     Appearance: Normal appearance.   HENT:      Head: Atraumatic.   Cardiovascular:      Rate and Rhythm: Normal rate and regular rhythm.      Pulses: Normal pulses.      Heart sounds: Normal heart sounds.   Pulmonary:      Effort: Pulmonary effort is normal.      Breath sounds: Normal breath sounds.   Abdominal:      General: Abdomen is flat. Bowel sounds are normal.      Palpations: Abdomen is soft.   Neurological:      Mental Status: She is alert.   Psychiatric:         Mood and Affect: Mood normal.         Assessment:       1. Medication refill    2. Type 2 diabetes mellitus with diabetic polyneuropathy, without long-term current use of insulin    3. Acquired hypothyroidism    4. Essential hypertension    5. Depression, unspecified depression type    6. Hyperlipidemia associated with type 2 diabetes mellitus    7. Hypomagnesemia    8. Current moderate episode of major depressive disorder without prior episode    9. Chronic maxillary sinusitis    10. Numbness and tingling    11. Abnormal finding of blood chemistry, unspecified        Plan:       1. Type 2 diabetes mellitus with diabetic polyneuropathy, without long-term current use of insulin  - A1C 5.8 in 10/2024 on diet control  - Hemoglobin A1C; Future    2. Acquired hypothyroidism  - TSH euthyroid in 10/2024  - Continue with synthroid  - T4, Free; Future  - TSH; Future    3. Essential hypertension  - Controlled and continue with lisinopril metoprolol   - CBC Auto Differential; Future  - " Comprehensive Metabolic Panel; Future  - furosemide (LASIX) 40 MG tablet; Take 1 tablet (40 mg total) by mouth once daily.  Dispense: 30 tablet; Refill: 2    4. Depression, unspecified depression type  - Continue with Wellbutrin   - Ambulatory referral/consult to Psychology; Future    5. Hyperlipidemia associated with type 2 diabetes mellitus  - Lipid Panel; Future    6. Hypomagnesemia  - Magnesium; Future    7. Current moderate episode of major depressive disorder without prior episode  - buPROPion (WELLBUTRIN XL) 300 MG 24 hr tablet; Take 1 tablet (300 mg total) by mouth once daily.  Dispense: 90 tablet; Refill: 1    8. Chronic maxillary sinusitis  - cetirizine (ZYRTEC) 10 MG tablet; Take 0.5 tablets (5 mg total) by mouth daily as needed for Allergies or Rhinitis.  Dispense: 30 tablet; Refill: 5    9. Numbness and tingling  - Iron and TIBC; Future  - Ferritin; Future  - Vitamin B12; Future  - Folate; Future  - Ambulatory referral/consult to Neurology; Future  - Fall precautions discussed  - Advised to use cane or walker for ambulation     10. Abnormal finding of blood chemistry, unspecified  - Iron and TIBC; Future  - Ferritin; Future    11. Medication refill (Primary)    Patient with be reevaluated in as scheduled  or sooner jhonatan Hernandez NP       [1]   Patient Active Problem List  Diagnosis    Abdominal pain, generalized    Depression    Anxiety    GERD (gastroesophageal reflux disease)    Fibromyalgia, onset  2009    Heartburn    Female dyspareunia    Pain in joint, lower leg    Hypothyroidism    Good hypertension control    Migraine    Lumbar spondylosis    DDD (degenerative disc disease), lumbar    Obesity, Class II, BMI 35-39.9, with comorbidity, today 31    Thyroid disease    Thyroiditis    Goiter    Abnormal thyroid blood test    Hypertension associated with diabetes    Dysmetabolic syndrome    Osteopenia    Hashimoto's thyroiditis    Left knee pain    Dysphagia    Postmenopausal    Type 2 diabetes mellitus  with hyperglycemia, onset 2016    Hypercholesterolemia, baseline     Obesity (BMI 30-39.9), today 31.2    Family history of premature CAD    CORONADO (dyspnea on exertion), onset 1/2017    Cardiovascular event risk, ASCVD 10-year risk 22.9%, 2016    Kidney stone on right side    Vertigo    REYES on CPAP, use 100%    Abdominal obesity    Irritable bowel syndrome with both constipation and diarrhea    Primary osteoarthritis involving multiple joints    Chronic fatigue    Intractable migraine without aura and with status migrainosus, onset 9/2017    NSAID long-term use    Left lumbar radiculitis    Other spondylosis, lumbar region    Pelvic pain in female    Chronic pain of left knee    Acute medial meniscal tear, right, subsequent encounter    Nodular thyroid disease    Hyperlipidemia associated with type 2 diabetes mellitus    Gait abnormality    Lumbar radiculitis    Type 2 diabetes mellitus with diabetic polyneuropathy, without long-term current use of insulin    Hypovitaminosis D    Severe obesity (BMI 35.0-39.9) with comorbidity    Arthritis of right knee    Acute hypokalemia    Chronic sphenoidal sinusitis    Chronic obstructive pulmonary disease, unspecified COPD type   [2]   Current Outpatient Medications:     albuterol (PROAIR HFA) 90 mcg/actuation inhaler, Inhale 2 puffs into the lungs every 6 (six) hours as needed for Wheezing. Rescue, Disp: 6.7 g, Rfl: 11    chlordiazepoxide-clidinium 5-2.5 mg (LIBRAX) 5-2.5 mg Cap, TAKE 1 CAPSULE BY MOUTH TWICE DAILY AS NEEDED, Disp: 60 capsule, Rfl: 0    diclofenac sodium (VOLTAREN ARTHRITIS PAIN) 1 % Gel, Apply 2 g topically once daily., Disp: 200 g, Rfl: 11    esomeprazole (NEXIUM) 40 MG capsule, Take 1 capsule (40 mg total) by mouth once daily., Disp: 90 capsule, Rfl: 2    fluticasone propionate (FLONASE) 50 mcg/actuation nasal spray, 1 spray (50 mcg total) by Each Nostril route 2 (two) times a day., Disp: 11.1 mL, Rfl: 0    gabapentin (NEURONTIN) 100 MG capsule, Take  1 capsule (100 mg total) by mouth 2 (two) times daily., Disp: 180 capsule, Rfl: 3    levocetirizine (XYZAL) 5 MG tablet, Take 1 tablet (5 mg total) by mouth every evening., Disp: 30 tablet, Rfl: 0    levothyroxine (SYNTHROID) 75 MCG tablet, TAKE 1 TABLET DAILY BEFORE BREAKFAST, Disp: 90 tablet, Rfl: 3    lisinopriL (PRINIVIL,ZESTRIL) 20 MG tablet, Take 2 tablets (40 mg total) by mouth once daily., Disp: 180 tablet, Rfl: 3    metoprolol succinate (TOPROL-XL) 25 MG 24 hr tablet, Take 1 tablet (25 mg total) by mouth once daily., Disp: 90 tablet, Rfl: 3    rOPINIRole (REQUIP) 2 MG tablet, Take 1 tablet (2 mg total) by mouth every evening., Disp: 90 tablet, Rfl: 2    aspirin 81 MG Chew, Take 1 tablet (81 mg total) by mouth once daily., Disp: , Rfl: 0    azelastine (ASTELIN) 137 mcg (0.1 %) nasal spray, 1 spray (137 mcg total) by Nasal route 2 (two) times daily., Disp: 30 mL, Rfl: 11    buPROPion (WELLBUTRIN XL) 300 MG 24 hr tablet, Take 1 tablet (300 mg total) by mouth once daily., Disp: 90 tablet, Rfl: 1    cetirizine (ZYRTEC) 10 MG tablet, Take 0.5 tablets (5 mg total) by mouth daily as needed for Allergies or Rhinitis., Disp: 30 tablet, Rfl: 5    DULoxetine (CYMBALTA) 60 MG capsule, Take 1 capsule (60 mg total) by mouth once daily., Disp: 90 capsule, Rfl: 3    furosemide (LASIX) 40 MG tablet, Take 1 tablet (40 mg total) by mouth once daily., Disp: 30 tablet, Rfl: 2    multivitamin (ONE DAILY MULTIVITAMIN) per tablet, Take 1 tablet by mouth once daily. (Patient not taking: Reported on 7/25/2025), Disp: , Rfl:     ondansetron (ZOFRAN-ODT) 4 MG TbDL, Take 1 tablet (4 mg total) by mouth every 6 (six) hours as needed (Nausea). (Patient not taking: Reported on 7/25/2025), Disp: 10 tablet, Rfl: 0    rosuvastatin (CRESTOR) 10 MG tablet, Take 1 tablet (10 mg total) by mouth once daily. (Patient not taking: Reported on 7/25/2025), Disp: 90 tablet, Rfl: 3    triamcinolone acetonide 0.1% (KENALOG) 0.1 % cream, Apply topically 2  (two) times daily. To scalp scab for 10 days, Disp: 15 g, Rfl: 3

## 2025-07-28 ENCOUNTER — PATIENT MESSAGE (OUTPATIENT)
Dept: PSYCHIATRY | Facility: CLINIC | Age: 75
End: 2025-07-28
Payer: MEDICARE

## 2025-08-11 ENCOUNTER — LAB VISIT (OUTPATIENT)
Dept: LAB | Facility: HOSPITAL | Age: 75
End: 2025-08-11
Attending: NURSE PRACTITIONER
Payer: MEDICARE

## 2025-08-11 DIAGNOSIS — R79.9 ABNORMAL FINDING OF BLOOD CHEMISTRY, UNSPECIFIED: ICD-10-CM

## 2025-08-11 DIAGNOSIS — E83.42 HYPOMAGNESEMIA: ICD-10-CM

## 2025-08-11 DIAGNOSIS — R20.2 NUMBNESS AND TINGLING: ICD-10-CM

## 2025-08-11 DIAGNOSIS — I10 ESSENTIAL HYPERTENSION: ICD-10-CM

## 2025-08-11 DIAGNOSIS — R20.0 NUMBNESS AND TINGLING: ICD-10-CM

## 2025-08-11 DIAGNOSIS — E78.5 HYPERLIPIDEMIA ASSOCIATED WITH TYPE 2 DIABETES MELLITUS: ICD-10-CM

## 2025-08-11 DIAGNOSIS — E11.42 TYPE 2 DIABETES MELLITUS WITH DIABETIC POLYNEUROPATHY, WITHOUT LONG-TERM CURRENT USE OF INSULIN: ICD-10-CM

## 2025-08-11 DIAGNOSIS — E03.9 ACQUIRED HYPOTHYROIDISM: ICD-10-CM

## 2025-08-11 DIAGNOSIS — E11.69 HYPERLIPIDEMIA ASSOCIATED WITH TYPE 2 DIABETES MELLITUS: ICD-10-CM

## 2025-08-11 LAB
ABSOLUTE EOSINOPHIL (OHS): 0.13 K/UL
ABSOLUTE MONOCYTE (OHS): 0.37 K/UL (ref 0.3–1)
ABSOLUTE NEUTROPHIL COUNT (OHS): 2.72 K/UL (ref 1.8–7.7)
ALBUMIN SERPL BCP-MCNC: 4 G/DL (ref 3.5–5.2)
ALP SERPL-CCNC: 78 UNIT/L (ref 40–150)
ALT SERPL W/O P-5'-P-CCNC: 20 UNIT/L (ref 0–55)
ANION GAP (OHS): 12 MMOL/L (ref 8–16)
AST SERPL-CCNC: 26 UNIT/L (ref 0–50)
BASOPHILS # BLD AUTO: 0.04 K/UL
BASOPHILS NFR BLD AUTO: 0.7 %
BILIRUB SERPL-MCNC: 0.3 MG/DL (ref 0.1–1)
BUN SERPL-MCNC: 30 MG/DL (ref 8–23)
CALCIUM SERPL-MCNC: 9.1 MG/DL (ref 8.7–10.5)
CHLORIDE SERPL-SCNC: 104 MMOL/L (ref 95–110)
CHOLEST SERPL-MCNC: 248 MG/DL (ref 120–199)
CHOLEST/HDLC SERPL: 4.6 {RATIO} (ref 2–5)
CO2 SERPL-SCNC: 27 MMOL/L (ref 23–29)
CREAT SERPL-MCNC: 1.3 MG/DL (ref 0.5–1.4)
EAG (OHS): 120 MG/DL (ref 68–131)
ERYTHROCYTE [DISTWIDTH] IN BLOOD BY AUTOMATED COUNT: 13.9 % (ref 11.5–14.5)
FERRITIN SERPL-MCNC: 66 NG/ML (ref 20–300)
FOLATE SERPL-MCNC: 11 NG/ML (ref 4–24)
GFR SERPLBLD CREATININE-BSD FMLA CKD-EPI: 42 ML/MIN/1.73/M2
GLUCOSE SERPL-MCNC: 98 MG/DL (ref 70–110)
HBA1C MFR BLD: 5.8 % (ref 4–5.6)
HCT VFR BLD AUTO: 36.5 % (ref 37–48.5)
HDLC SERPL-MCNC: 54 MG/DL (ref 40–75)
HDLC SERPL: 21.8 % (ref 20–50)
HGB BLD-MCNC: 11.6 GM/DL (ref 12–16)
IMM GRANULOCYTES # BLD AUTO: 0 K/UL (ref 0–0.04)
IMM GRANULOCYTES NFR BLD AUTO: 0 % (ref 0–0.5)
IRON SATN MFR SERPL: 10 % (ref 20–50)
IRON SERPL-MCNC: 33 UG/DL (ref 30–160)
LDLC SERPL CALC-MCNC: 167.6 MG/DL (ref 63–159)
LYMPHOCYTES # BLD AUTO: 2.46 K/UL (ref 1–4.8)
MAGNESIUM SERPL-MCNC: 1.8 MG/DL (ref 1.6–2.6)
MCH RBC QN AUTO: 29.2 PG (ref 27–31)
MCHC RBC AUTO-ENTMCNC: 31.8 G/DL (ref 32–36)
MCV RBC AUTO: 92 FL (ref 82–98)
NONHDLC SERPL-MCNC: 194 MG/DL
NUCLEATED RBC (/100WBC) (OHS): 0 /100 WBC
PLATELET # BLD AUTO: 208 K/UL (ref 150–450)
PMV BLD AUTO: 10.2 FL (ref 9.2–12.9)
POTASSIUM SERPL-SCNC: 3.2 MMOL/L (ref 3.5–5.1)
PROT SERPL-MCNC: 7.1 GM/DL (ref 6–8.4)
RBC # BLD AUTO: 3.97 M/UL (ref 4–5.4)
RELATIVE EOSINOPHIL (OHS): 2.3 %
RELATIVE LYMPHOCYTE (OHS): 43 % (ref 18–48)
RELATIVE MONOCYTE (OHS): 6.5 % (ref 4–15)
RELATIVE NEUTROPHIL (OHS): 47.5 % (ref 38–73)
SODIUM SERPL-SCNC: 143 MMOL/L (ref 136–145)
T4 FREE SERPL-MCNC: 1.35 NG/DL (ref 0.71–1.51)
TIBC SERPL-MCNC: 330 UG/DL (ref 250–450)
TRANSFERRIN SERPL-MCNC: 223 MG/DL (ref 200–375)
TRIGL SERPL-MCNC: 132 MG/DL (ref 30–150)
TSH SERPL-ACNC: 0.34 UIU/ML (ref 0.4–4)
VIT B12 SERPL-MCNC: 397 PG/ML (ref 210–950)
WBC # BLD AUTO: 5.72 K/UL (ref 3.9–12.7)

## 2025-08-11 PROCEDURE — 82607 VITAMIN B-12: CPT

## 2025-08-11 PROCEDURE — 82728 ASSAY OF FERRITIN: CPT

## 2025-08-11 PROCEDURE — 84443 ASSAY THYROID STIM HORMONE: CPT

## 2025-08-11 PROCEDURE — 83036 HEMOGLOBIN GLYCOSYLATED A1C: CPT

## 2025-08-11 PROCEDURE — 84466 ASSAY OF TRANSFERRIN: CPT

## 2025-08-11 PROCEDURE — 80061 LIPID PANEL: CPT

## 2025-08-11 PROCEDURE — 36415 COLL VENOUS BLD VENIPUNCTURE: CPT | Mod: PO

## 2025-08-11 PROCEDURE — 84439 ASSAY OF FREE THYROXINE: CPT

## 2025-08-11 PROCEDURE — 85025 COMPLETE CBC W/AUTO DIFF WBC: CPT

## 2025-08-11 PROCEDURE — 82746 ASSAY OF FOLIC ACID SERUM: CPT

## 2025-08-11 PROCEDURE — 83735 ASSAY OF MAGNESIUM: CPT

## 2025-08-11 PROCEDURE — 80053 COMPREHEN METABOLIC PANEL: CPT

## 2025-08-20 ENCOUNTER — TELEPHONE (OUTPATIENT)
Dept: FAMILY MEDICINE | Facility: CLINIC | Age: 75
End: 2025-08-20
Payer: MEDICARE

## 2025-08-29 DIAGNOSIS — R10.84 ABDOMINAL PAIN, GENERALIZED: ICD-10-CM

## 2025-08-29 DIAGNOSIS — K21.9 GASTROESOPHAGEAL REFLUX DISEASE WITHOUT ESOPHAGITIS: ICD-10-CM

## 2025-08-29 DIAGNOSIS — E11.42 TYPE 2 DIABETES MELLITUS WITH DIABETIC POLYNEUROPATHY, WITHOUT LONG-TERM CURRENT USE OF INSULIN: ICD-10-CM

## 2025-08-29 RX ORDER — DULOXETIN HYDROCHLORIDE 60 MG/1
60 CAPSULE, DELAYED RELEASE ORAL DAILY
Qty: 90 CAPSULE | Refills: 0 | Status: SHIPPED | OUTPATIENT
Start: 2025-08-29 | End: 2025-11-27

## 2025-08-29 RX ORDER — CHLORDIAZEPOXIDE HYDROCHLORIDE AND CLIDINIUM BROMIDE 5; 2.5 MG/1; MG/1
1 CAPSULE ORAL 2 TIMES DAILY PRN
Qty: 60 CAPSULE | Refills: 0 | Status: SHIPPED | OUTPATIENT
Start: 2025-08-29

## 2025-09-02 ENCOUNTER — OFFICE VISIT (OUTPATIENT)
Dept: FAMILY MEDICINE | Facility: CLINIC | Age: 75
End: 2025-09-02
Payer: MEDICARE

## 2025-09-02 VITALS
OXYGEN SATURATION: 99 % | HEART RATE: 60 BPM | WEIGHT: 157.44 LBS | TEMPERATURE: 98 F | RESPIRATION RATE: 14 BRPM | DIASTOLIC BLOOD PRESSURE: 60 MMHG | HEIGHT: 62 IN | SYSTOLIC BLOOD PRESSURE: 126 MMHG | BODY MASS INDEX: 28.97 KG/M2

## 2025-09-02 DIAGNOSIS — E11.69 HYPERLIPIDEMIA ASSOCIATED WITH TYPE 2 DIABETES MELLITUS: ICD-10-CM

## 2025-09-02 DIAGNOSIS — K58.9 IRRITABLE BOWEL SYNDROME, UNSPECIFIED TYPE: ICD-10-CM

## 2025-09-02 DIAGNOSIS — R10.84 ABDOMINAL PAIN, GENERALIZED: ICD-10-CM

## 2025-09-02 DIAGNOSIS — F32.A DEPRESSION, UNSPECIFIED DEPRESSION TYPE: ICD-10-CM

## 2025-09-02 DIAGNOSIS — Z01.00 DIABETIC EYE EXAM: ICD-10-CM

## 2025-09-02 DIAGNOSIS — E78.5 HYPERLIPIDEMIA ASSOCIATED WITH TYPE 2 DIABETES MELLITUS: ICD-10-CM

## 2025-09-02 DIAGNOSIS — K21.9 GASTROESOPHAGEAL REFLUX DISEASE WITHOUT ESOPHAGITIS: ICD-10-CM

## 2025-09-02 DIAGNOSIS — F43.20 GRIEF REACTION: ICD-10-CM

## 2025-09-02 DIAGNOSIS — E87.6 HYPOKALEMIA: ICD-10-CM

## 2025-09-02 DIAGNOSIS — E11.9 DIABETIC EYE EXAM: ICD-10-CM

## 2025-09-02 DIAGNOSIS — E11.42 TYPE 2 DIABETES MELLITUS WITH DIABETIC POLYNEUROPATHY, WITHOUT LONG-TERM CURRENT USE OF INSULIN: Primary | ICD-10-CM

## 2025-09-02 DIAGNOSIS — E83.42 HYPOMAGNESEMIA: ICD-10-CM

## 2025-09-02 DIAGNOSIS — I10 ESSENTIAL HYPERTENSION: ICD-10-CM

## 2025-09-02 DIAGNOSIS — E03.9 ACQUIRED HYPOTHYROIDISM: ICD-10-CM

## 2025-09-02 PROCEDURE — 99999 PR PBB SHADOW E&M-EST. PATIENT-LVL IV: CPT | Mod: PBBFAC,,, | Performed by: FAMILY MEDICINE

## 2025-09-02 RX ORDER — LEVOTHYROXINE SODIUM 50 UG/1
50 TABLET ORAL
Qty: 90 TABLET | Refills: 3 | Status: SHIPPED | OUTPATIENT
Start: 2025-09-02

## 2025-09-02 RX ORDER — POTASSIUM CHLORIDE 750 MG/1
10 TABLET, EXTENDED RELEASE ORAL DAILY
Qty: 90 TABLET | Refills: 3 | Status: SHIPPED | OUTPATIENT
Start: 2025-09-02

## 2025-09-02 RX ORDER — FUROSEMIDE 20 MG/1
20 TABLET ORAL DAILY
Qty: 90 TABLET | Refills: 3 | Status: SHIPPED | OUTPATIENT
Start: 2025-09-02

## 2025-09-02 RX ORDER — CHLORDIAZEPOXIDE HYDROCHLORIDE AND CLIDINIUM BROMIDE 5; 2.5 MG/1; MG/1
1 CAPSULE ORAL 2 TIMES DAILY PRN
Qty: 60 CAPSULE | Refills: 5 | Status: SHIPPED | OUTPATIENT
Start: 2025-09-02

## 2025-09-02 RX ORDER — ATORVASTATIN CALCIUM 20 MG/1
20 TABLET, FILM COATED ORAL DAILY
Qty: 90 TABLET | Refills: 3 | Status: SHIPPED | OUTPATIENT
Start: 2025-09-02 | End: 2026-09-02

## 2025-09-05 ENCOUNTER — PATIENT OUTREACH (OUTPATIENT)
Dept: ADMINISTRATIVE | Facility: HOSPITAL | Age: 75
End: 2025-09-05
Payer: MEDICARE

## (undated) DEVICE — PACK ARTHROSCOPY W/ISO BAC

## (undated) DEVICE — SEE MEDLINE ITEM 152622

## (undated) DEVICE — NDL SPINAL SPINOCAN 22GX3.5

## (undated) DEVICE — CUTTER AGGRESSIVE PLUS 3.5MM

## (undated) DEVICE — DRAPE STERI U-SHAPED 47X51IN

## (undated) DEVICE — MAT QUICK 40X30 FLOOR FLUID LF

## (undated) DEVICE — APPLICATOR CHLORAPREP CLR 10.5

## (undated) DEVICE — GLOVE SURG ULTRA TOUCH 7.5

## (undated) DEVICE — NDL SAFETY 25G X 1.5 ECLIPSE

## (undated) DEVICE — PAD ELECTROSURGICAL PAT PLATE

## (undated) DEVICE — BLADE SURG CARBON STEEL SZ11

## (undated) DEVICE — NDL SPINAL 18GX3.5 SPINOCAN

## (undated) DEVICE — SYS LABEL CORRECT MED

## (undated) DEVICE — SYR DISP LL 5CC

## (undated) DEVICE — NDL HYPODERMIC BLUNT 18G 1.5IN

## (undated) DEVICE — SHEET DRAPE MEDIUM

## (undated) DEVICE — SEE MEDLINE ITEM 152678

## (undated) DEVICE — DRESSING N ADH OIL EMUL 3X3

## (undated) DEVICE — GOWN B1 X-LG X-LONG

## (undated) DEVICE — SEE MEDLINE ITEM 146313

## (undated) DEVICE — SOL IRR STRL WATER 500ML

## (undated) DEVICE — BANDAGE ESMARK 6X12

## (undated) DEVICE — UNDERGLOVES BIOGEL PI SIZE 7.5

## (undated) DEVICE — SUT ETHILON 3-0 PS2 18 BLK

## (undated) DEVICE — GLOVE PROTEXIS PI CLASSIC 6.5

## (undated) DEVICE — GLOVE SURG ULTRA TOUCH 8

## (undated) DEVICE — NDL BOX COUNTER

## (undated) DEVICE — GAUZE SPONGE 4X4 12PLY

## (undated) DEVICE — GLOVE PROTEXIS PI CLASSIC 6.0

## (undated) DEVICE — CANNULA RADIOPAQUE 20G CURVED

## (undated) DEVICE — PAD GROUNDING DISPER ELECTRODE

## (undated) DEVICE — SLEEVE SCD EXPRESS CALF MEDIUM

## (undated) DEVICE — NDL SPINAL 25GX3.5 SPINOCAN

## (undated) DEVICE — CUP MEDICINE STERILE 2OZ

## (undated) DEVICE — SYR 10CC LUER LOCK

## (undated) DEVICE — GAUZE SPONGE BULKEE 6X6.75IN

## (undated) DEVICE — NDL SAFETY 21G X 1 1/2 ECLPSE

## (undated) DEVICE — PAD CAST SPECIALIST STRL 6

## (undated) DEVICE — SEE MEDLINE ITEM 157131

## (undated) DEVICE — SEE MEDLINE ITEM 146271

## (undated) DEVICE — PADDING CAST SPECIALIST 6X4YD

## (undated) DEVICE — CONTAINER SPECIMEN STRL 4OZ

## (undated) DEVICE — MARKER SKIN STND TIP BLUE BARR

## (undated) DEVICE — SEE MEDLINE ITEM 146231

## (undated) DEVICE — SOL IRR NACL .9% 3000ML

## (undated) DEVICE — LABEL FOR UTILITY MARKER

## (undated) DEVICE — TUBING FLOSTEADY ARTHROSCOPY

## (undated) DEVICE — SPONGE GAUZE 16PLY 4X4

## (undated) DEVICE — SEE MEDLINE ITEM 157128

## (undated) DEVICE — GLOVE PROTEXIS PI CLASSIC 7.5

## (undated) DEVICE — CANNULA CVD 100MM X 20G

## (undated) DEVICE — CANISTER SUCTION MEDI-VAC 12L

## (undated) DEVICE — TUBING MINIBORE EXTENSION

## (undated) DEVICE — APPLICATOR CHLORAPREP ORN 26ML

## (undated) DEVICE — SEE MEDLINE ITEM 152530

## (undated) DEVICE — COVER SURG LIGHT HANDLE

## (undated) DEVICE — SEE MEDLINE ITEM 152487

## (undated) DEVICE — TOURNIQUET SB QC DP 34X4IN